# Patient Record
Sex: FEMALE | Race: WHITE | NOT HISPANIC OR LATINO | Employment: FULL TIME | URBAN - METROPOLITAN AREA
[De-identification: names, ages, dates, MRNs, and addresses within clinical notes are randomized per-mention and may not be internally consistent; named-entity substitution may affect disease eponyms.]

---

## 2017-01-20 ENCOUNTER — AMBULATORY - HEALTHEAST (OUTPATIENT)
Dept: FAMILY MEDICINE | Facility: CLINIC | Age: 67
End: 2017-01-20

## 2017-01-20 DIAGNOSIS — Z12.11 SCREENING FOR COLON CANCER: ICD-10-CM

## 2017-02-20 ENCOUNTER — OFFICE VISIT - HEALTHEAST (OUTPATIENT)
Dept: FAMILY MEDICINE | Facility: CLINIC | Age: 67
End: 2017-02-20

## 2017-02-20 ENCOUNTER — RECORDS - HEALTHEAST (OUTPATIENT)
Dept: MAMMOGRAPHY | Facility: CLINIC | Age: 67
End: 2017-02-20

## 2017-02-20 DIAGNOSIS — K21.9 GASTROESOPHAGEAL REFLUX DISEASE WITHOUT ESOPHAGITIS: ICD-10-CM

## 2017-02-20 DIAGNOSIS — I10 HYPERTENSION: ICD-10-CM

## 2017-02-20 DIAGNOSIS — Z12.31 ENCOUNTER FOR SCREENING MAMMOGRAM FOR MALIGNANT NEOPLASM OF BREAST: ICD-10-CM

## 2017-02-20 DIAGNOSIS — E78.2 MIXED HYPERLIPIDEMIA: ICD-10-CM

## 2017-02-20 DIAGNOSIS — Z00.00 ROUTINE GENERAL MEDICAL EXAMINATION AT A HEALTH CARE FACILITY: ICD-10-CM

## 2017-02-20 DIAGNOSIS — Z23 NEED FOR ZOSTER VACCINATION: ICD-10-CM

## 2017-02-20 LAB
CHOLEST SERPL-MCNC: 257 MG/DL
FASTING STATUS PATIENT QL REPORTED: YES
HDLC SERPL-MCNC: 64 MG/DL
LDLC SERPL CALC-MCNC: 177 MG/DL
TRIGL SERPL-MCNC: 82 MG/DL

## 2017-02-20 ASSESSMENT — MIFFLIN-ST. JEOR: SCORE: 1179.06

## 2017-02-23 LAB
HPV INTERPRETATION - HISTORICAL: NORMAL
HPV INTERPRETER - HISTORICAL: NORMAL

## 2017-02-27 ENCOUNTER — COMMUNICATION - HEALTHEAST (OUTPATIENT)
Dept: FAMILY MEDICINE | Facility: CLINIC | Age: 67
End: 2017-02-27

## 2017-02-28 LAB
BKR LAB AP ABNORMAL BLEEDING: NO
BKR LAB AP BIRTH CONTROL/HORMONES: NORMAL
BKR LAB AP CERVICAL APPEARANCE: NORMAL
BKR LAB AP GYN ADEQUACY: NORMAL
BKR LAB AP GYN INTERPRETATION: NORMAL
BKR LAB AP HPV REFLEX: NORMAL
BKR LAB AP LMP: NORMAL
BKR LAB AP PATIENT STATUS: NORMAL
BKR LAB AP PREVIOUS ABNORMAL: NORMAL
BKR LAB AP PREVIOUS NORMAL: 2014
HIGH RISK?: NO
PATH REPORT.COMMENTS IMP SPEC: NORMAL
RESULT FLAG (HE HISTORICAL CONVERSION): NORMAL

## 2017-04-12 ENCOUNTER — COMMUNICATION - HEALTHEAST (OUTPATIENT)
Dept: FAMILY MEDICINE | Facility: CLINIC | Age: 67
End: 2017-04-12

## 2017-07-19 ENCOUNTER — AMBULATORY - HEALTHEAST (OUTPATIENT)
Dept: FAMILY MEDICINE | Facility: CLINIC | Age: 67
End: 2017-07-19

## 2017-07-19 DIAGNOSIS — Z23 NEED FOR VACCINATION FOR STREP PNEUMONIAE: ICD-10-CM

## 2017-07-25 ENCOUNTER — AMBULATORY - HEALTHEAST (OUTPATIENT)
Dept: NURSING | Facility: CLINIC | Age: 67
End: 2017-07-25

## 2017-07-25 DIAGNOSIS — Z00.00 ROUTINE HEALTH MAINTENANCE: ICD-10-CM

## 2017-07-25 DIAGNOSIS — Z23 NEED FOR VACCINATION FOR STREP PNEUMONIAE: ICD-10-CM

## 2017-07-31 ENCOUNTER — RECORDS - HEALTHEAST (OUTPATIENT)
Dept: ADMINISTRATIVE | Facility: OTHER | Age: 67
End: 2017-07-31

## 2018-03-21 ENCOUNTER — OFFICE VISIT - HEALTHEAST (OUTPATIENT)
Dept: FAMILY MEDICINE | Facility: CLINIC | Age: 68
End: 2018-03-21

## 2018-03-21 ENCOUNTER — RECORDS - HEALTHEAST (OUTPATIENT)
Dept: MAMMOGRAPHY | Facility: CLINIC | Age: 68
End: 2018-03-21

## 2018-03-21 DIAGNOSIS — I10 ESSENTIAL HYPERTENSION: ICD-10-CM

## 2018-03-21 DIAGNOSIS — Z12.31 ENCOUNTER FOR SCREENING MAMMOGRAM FOR MALIGNANT NEOPLASM OF BREAST: ICD-10-CM

## 2018-03-21 DIAGNOSIS — E78.2 MIXED HYPERLIPIDEMIA: ICD-10-CM

## 2018-03-21 DIAGNOSIS — Z23 NEED FOR TETANUS BOOSTER: ICD-10-CM

## 2018-03-21 DIAGNOSIS — Z12.31 VISIT FOR SCREENING MAMMOGRAM: ICD-10-CM

## 2018-03-21 DIAGNOSIS — I10 HYPERTENSION: ICD-10-CM

## 2018-03-21 DIAGNOSIS — Z00.00 ENCOUNTER FOR MEDICARE ANNUAL WELLNESS EXAM: ICD-10-CM

## 2018-03-21 DIAGNOSIS — K21.9 GASTROESOPHAGEAL REFLUX DISEASE WITHOUT ESOPHAGITIS: ICD-10-CM

## 2018-03-21 LAB
ALBUMIN SERPL-MCNC: 4 G/DL (ref 3.5–5)
ALP SERPL-CCNC: 91 U/L (ref 45–120)
ALT SERPL W P-5'-P-CCNC: 23 U/L (ref 0–45)
ANION GAP SERPL CALCULATED.3IONS-SCNC: 8 MMOL/L (ref 5–18)
AST SERPL W P-5'-P-CCNC: 21 U/L (ref 0–40)
BILIRUB SERPL-MCNC: 0.9 MG/DL (ref 0–1)
BUN SERPL-MCNC: 21 MG/DL (ref 8–22)
CALCIUM SERPL-MCNC: 9.9 MG/DL (ref 8.5–10.5)
CHLORIDE BLD-SCNC: 105 MMOL/L (ref 98–107)
CHOLEST SERPL-MCNC: 248 MG/DL
CO2 SERPL-SCNC: 28 MMOL/L (ref 22–31)
CREAT SERPL-MCNC: 0.64 MG/DL (ref 0.6–1.1)
FASTING STATUS PATIENT QL REPORTED: YES
GFR SERPL CREATININE-BSD FRML MDRD: >60 ML/MIN/1.73M2
GLUCOSE BLD-MCNC: 91 MG/DL (ref 70–125)
HDLC SERPL-MCNC: 57 MG/DL
LDLC SERPL CALC-MCNC: 166 MG/DL
POTASSIUM BLD-SCNC: 5.1 MMOL/L (ref 3.5–5)
PROT SERPL-MCNC: 7.3 G/DL (ref 6–8)
SODIUM SERPL-SCNC: 141 MMOL/L (ref 136–145)
TRIGL SERPL-MCNC: 126 MG/DL

## 2018-03-21 ASSESSMENT — MIFFLIN-ST. JEOR: SCORE: 1194.93

## 2018-04-02 ENCOUNTER — AMBULATORY - HEALTHEAST (OUTPATIENT)
Dept: FAMILY MEDICINE | Facility: CLINIC | Age: 68
End: 2018-04-02

## 2018-04-02 DIAGNOSIS — E78.2 MIXED HYPERLIPIDEMIA: ICD-10-CM

## 2019-03-25 ENCOUNTER — RECORDS - HEALTHEAST (OUTPATIENT)
Dept: MAMMOGRAPHY | Facility: CLINIC | Age: 69
End: 2019-03-25

## 2019-03-25 ENCOUNTER — OFFICE VISIT - HEALTHEAST (OUTPATIENT)
Dept: FAMILY MEDICINE | Facility: CLINIC | Age: 69
End: 2019-03-25

## 2019-03-25 DIAGNOSIS — Z00.00 ROUTINE GENERAL MEDICAL EXAMINATION AT A HEALTH CARE FACILITY: ICD-10-CM

## 2019-03-25 DIAGNOSIS — I10 HYPERTENSION: ICD-10-CM

## 2019-03-25 DIAGNOSIS — Z12.31 ENCOUNTER FOR SCREENING MAMMOGRAM FOR MALIGNANT NEOPLASM OF BREAST: ICD-10-CM

## 2019-03-25 DIAGNOSIS — Z23 NEED FOR VACCINATION AGAINST STREPTOCOCCUS PNEUMONIAE: ICD-10-CM

## 2019-03-25 DIAGNOSIS — Z23 NEED FOR SHINGLES VACCINE: ICD-10-CM

## 2019-03-25 DIAGNOSIS — E78.2 MIXED HYPERLIPIDEMIA: ICD-10-CM

## 2019-03-25 DIAGNOSIS — Z00.00 ENCOUNTER FOR MEDICARE ANNUAL WELLNESS EXAM: ICD-10-CM

## 2019-03-25 LAB
ALBUMIN SERPL-MCNC: 4.5 G/DL (ref 3.5–5)
ALP SERPL-CCNC: 81 U/L (ref 45–120)
ALT SERPL W P-5'-P-CCNC: 23 U/L (ref 0–45)
ANION GAP SERPL CALCULATED.3IONS-SCNC: 14 MMOL/L (ref 5–18)
AST SERPL W P-5'-P-CCNC: 26 U/L (ref 0–40)
BILIRUB SERPL-MCNC: 1 MG/DL (ref 0–1)
BUN SERPL-MCNC: 27 MG/DL (ref 8–22)
CALCIUM SERPL-MCNC: 9.9 MG/DL (ref 8.5–10.5)
CHLORIDE BLD-SCNC: 105 MMOL/L (ref 98–107)
CHOLEST SERPL-MCNC: 272 MG/DL
CO2 SERPL-SCNC: 23 MMOL/L (ref 22–31)
CREAT SERPL-MCNC: 0.71 MG/DL (ref 0.6–1.1)
FASTING STATUS PATIENT QL REPORTED: YES
GFR SERPL CREATININE-BSD FRML MDRD: >60 ML/MIN/1.73M2
GLUCOSE BLD-MCNC: 86 MG/DL (ref 70–125)
HDLC SERPL-MCNC: 70 MG/DL
LDLC SERPL CALC-MCNC: 182 MG/DL
POTASSIUM BLD-SCNC: 3.9 MMOL/L (ref 3.5–5)
PROT SERPL-MCNC: 7.8 G/DL (ref 6–8)
SODIUM SERPL-SCNC: 142 MMOL/L (ref 136–145)
TRIGL SERPL-MCNC: 101 MG/DL

## 2019-03-25 ASSESSMENT — MIFFLIN-ST. JEOR: SCORE: 1191.03

## 2019-03-26 LAB
25(OH)D3 SERPL-MCNC: 28.3 NG/ML (ref 30–80)
25(OH)D3 SERPL-MCNC: 28.3 NG/ML (ref 30–80)

## 2019-03-29 ENCOUNTER — AMBULATORY - HEALTHEAST (OUTPATIENT)
Dept: FAMILY MEDICINE | Facility: CLINIC | Age: 69
End: 2019-03-29

## 2019-03-29 DIAGNOSIS — Z78.0 MENOPAUSE: ICD-10-CM

## 2019-06-25 ENCOUNTER — COMMUNICATION - HEALTHEAST (OUTPATIENT)
Dept: FAMILY MEDICINE | Facility: CLINIC | Age: 69
End: 2019-06-25

## 2019-07-05 ENCOUNTER — AMBULATORY - HEALTHEAST (OUTPATIENT)
Dept: NURSING | Facility: CLINIC | Age: 69
End: 2019-07-05

## 2019-07-05 ENCOUNTER — AMBULATORY - HEALTHEAST (OUTPATIENT)
Dept: FAMILY MEDICINE | Facility: CLINIC | Age: 69
End: 2019-07-05

## 2019-07-05 DIAGNOSIS — F10.20 ALCOHOL DEPENDENCE, DAILY USE (H): ICD-10-CM

## 2019-08-27 ENCOUNTER — RECORDS - HEALTHEAST (OUTPATIENT)
Dept: ADMINISTRATIVE | Facility: OTHER | Age: 69
End: 2019-08-27

## 2019-09-12 ENCOUNTER — COMMUNICATION - HEALTHEAST (OUTPATIENT)
Dept: SCHEDULING | Facility: CLINIC | Age: 69
End: 2019-09-12

## 2019-09-13 ENCOUNTER — OFFICE VISIT - HEALTHEAST (OUTPATIENT)
Dept: FAMILY MEDICINE | Facility: CLINIC | Age: 69
End: 2019-09-13

## 2019-09-13 DIAGNOSIS — R05.9 COUGH: ICD-10-CM

## 2019-09-13 DIAGNOSIS — R06.2 WHEEZE: ICD-10-CM

## 2019-11-16 ENCOUNTER — AMBULATORY - HEALTHEAST (OUTPATIENT)
Dept: NURSING | Facility: CLINIC | Age: 69
End: 2019-11-16

## 2019-11-16 DIAGNOSIS — Z23 FLU VACCINE NEED: ICD-10-CM

## 2020-07-15 ENCOUNTER — OFFICE VISIT - HEALTHEAST (OUTPATIENT)
Dept: FAMILY MEDICINE | Facility: CLINIC | Age: 70
End: 2020-07-15

## 2020-07-15 DIAGNOSIS — L98.9 SKIN LESION: ICD-10-CM

## 2020-07-15 DIAGNOSIS — I10 HYPERTENSION: ICD-10-CM

## 2020-07-15 DIAGNOSIS — Z00.00 ENCOUNTER FOR MEDICARE ANNUAL WELLNESS EXAM: ICD-10-CM

## 2020-07-15 DIAGNOSIS — Z00.00 ROUTINE GENERAL MEDICAL EXAMINATION AT A HEALTH CARE FACILITY: ICD-10-CM

## 2020-07-15 DIAGNOSIS — E55.9 VITAMIN D DEFICIENCY: ICD-10-CM

## 2020-07-15 DIAGNOSIS — Z11.59 ENCOUNTER FOR HCV SCREENING TEST FOR LOW RISK PATIENT: ICD-10-CM

## 2020-07-15 LAB
ALBUMIN SERPL-MCNC: 4.4 G/DL (ref 3.5–5)
ALP SERPL-CCNC: 86 U/L (ref 45–120)
ALT SERPL W P-5'-P-CCNC: 19 U/L (ref 0–45)
ANION GAP SERPL CALCULATED.3IONS-SCNC: 13 MMOL/L (ref 5–18)
AST SERPL W P-5'-P-CCNC: 22 U/L (ref 0–40)
BILIRUB SERPL-MCNC: 0.9 MG/DL (ref 0–1)
BUN SERPL-MCNC: 18 MG/DL (ref 8–22)
CALCIUM SERPL-MCNC: 9.9 MG/DL (ref 8.5–10.5)
CHLORIDE BLD-SCNC: 102 MMOL/L (ref 98–107)
CHOLEST SERPL-MCNC: 257 MG/DL
CO2 SERPL-SCNC: 25 MMOL/L (ref 22–31)
CREAT SERPL-MCNC: 0.75 MG/DL (ref 0.6–1.1)
FASTING STATUS PATIENT QL REPORTED: YES
GFR SERPL CREATININE-BSD FRML MDRD: >60 ML/MIN/1.73M2
GLUCOSE BLD-MCNC: 95 MG/DL (ref 70–125)
HDLC SERPL-MCNC: 60 MG/DL
LDLC SERPL CALC-MCNC: 177 MG/DL
POTASSIUM BLD-SCNC: 4.8 MMOL/L (ref 3.5–5)
PROT SERPL-MCNC: 7.3 G/DL (ref 6–8)
SODIUM SERPL-SCNC: 140 MMOL/L (ref 136–145)
TRIGL SERPL-MCNC: 101 MG/DL

## 2020-07-15 ASSESSMENT — MIFFLIN-ST. JEOR: SCORE: 1167.72

## 2020-07-16 LAB
25(OH)D3 SERPL-MCNC: 32.8 NG/ML (ref 30–80)
25(OH)D3 SERPL-MCNC: 32.8 NG/ML (ref 30–80)
HCV AB SERPL QL IA: NEGATIVE

## 2020-07-20 ENCOUNTER — COMMUNICATION - HEALTHEAST (OUTPATIENT)
Dept: FAMILY MEDICINE | Facility: CLINIC | Age: 70
End: 2020-07-20

## 2020-10-10 ENCOUNTER — NURSE TRIAGE (OUTPATIENT)
Dept: NURSING | Facility: CLINIC | Age: 70
End: 2020-10-10

## 2020-10-10 NOTE — TELEPHONE ENCOUNTER
Wondering if there is a special flu shot available for older people.  Transferred to scheduling to make a nurse only appointment for a vaccination.  Christy Campo RN, [facility]      Additional Information    Influenza vaccine (shot), questions about    Protocols used: INFLUENZA - SEASONAL-A-AH

## 2020-10-14 ENCOUNTER — AMBULATORY - HEALTHEAST (OUTPATIENT)
Dept: NURSING | Facility: CLINIC | Age: 70
End: 2020-10-14

## 2020-11-02 ENCOUNTER — COMMUNICATION - HEALTHEAST (OUTPATIENT)
Dept: SCHEDULING | Facility: CLINIC | Age: 70
End: 2020-11-02

## 2020-11-09 ENCOUNTER — OFFICE VISIT - HEALTHEAST (OUTPATIENT)
Dept: FAMILY MEDICINE | Facility: CLINIC | Age: 70
End: 2020-11-09

## 2020-11-09 ENCOUNTER — COMMUNICATION - HEALTHEAST (OUTPATIENT)
Dept: FAMILY MEDICINE | Facility: CLINIC | Age: 70
End: 2020-11-09

## 2020-11-09 DIAGNOSIS — R40.4 SPELL OF ALTERED CONSCIOUSNESS: ICD-10-CM

## 2020-11-09 DIAGNOSIS — E78.2 MIXED HYPERLIPIDEMIA: ICD-10-CM

## 2020-11-17 ENCOUNTER — OFFICE VISIT (OUTPATIENT)
Dept: NEUROLOGY | Facility: CLINIC | Age: 70
End: 2020-11-17
Payer: COMMERCIAL

## 2020-11-17 ENCOUNTER — RECORDS - HEALTHEAST (OUTPATIENT)
Dept: ADMINISTRATIVE | Facility: OTHER | Age: 70
End: 2020-11-17

## 2020-11-17 VITALS
WEIGHT: 145 LBS | SYSTOLIC BLOOD PRESSURE: 140 MMHG | BODY MASS INDEX: 24.75 KG/M2 | HEART RATE: 63 BPM | DIASTOLIC BLOOD PRESSURE: 75 MMHG | HEIGHT: 64 IN

## 2020-11-17 DIAGNOSIS — F10.20 ALCOHOL DEPENDENCE, DAILY USE (H): ICD-10-CM

## 2020-11-17 DIAGNOSIS — G45.9 TIA (TRANSIENT ISCHEMIC ATTACK): Primary | ICD-10-CM

## 2020-11-17 DIAGNOSIS — R41.0 CONFUSION: ICD-10-CM

## 2020-11-17 DIAGNOSIS — I10 BENIGN ESSENTIAL HYPERTENSION: ICD-10-CM

## 2020-11-17 PROBLEM — K57.30 DIVERTICULOSIS OF LARGE INTESTINE: Status: ACTIVE | Noted: 2017-08-02

## 2020-11-17 PROCEDURE — 99205 OFFICE O/P NEW HI 60 MIN: CPT | Performed by: PSYCHIATRY & NEUROLOGY

## 2020-11-17 RX ORDER — LISINOPRIL/HYDROCHLOROTHIAZIDE 10-12.5 MG
1 TABLET ORAL
COMMUNITY
Start: 2020-07-15 | End: 2021-07-20

## 2020-11-17 RX ORDER — FENOFIBRATE 145 MG/1
145 TABLET, COATED ORAL DAILY
COMMUNITY
Start: 2020-11-10 | End: 2021-08-03

## 2020-11-17 RX ORDER — BIOTIN 800 MCG
1 TABLET ORAL
COMMUNITY
Start: 2020-07-15 | End: 2024-07-01

## 2020-11-17 RX ORDER — ASPIRIN 325 MG
325 TABLET ORAL DAILY
Status: ON HOLD | COMMUNITY
End: 2024-08-01

## 2020-11-17 SDOH — HEALTH STABILITY: MENTAL HEALTH: HOW OFTEN DO YOU HAVE 6 OR MORE DRINKS ON ONE OCCASION?: NOT ASKED

## 2020-11-17 SDOH — HEALTH STABILITY: MENTAL HEALTH: HOW OFTEN DO YOU HAVE A DRINK CONTAINING ALCOHOL?: 4 OR MORE TIMES A WEEK

## 2020-11-17 SDOH — HEALTH STABILITY: MENTAL HEALTH: HOW MANY STANDARD DRINKS CONTAINING ALCOHOL DO YOU HAVE ON A TYPICAL DAY?: NOT ASKED

## 2020-11-17 ASSESSMENT — MIFFLIN-ST. JEOR: SCORE: 1172.72

## 2020-11-17 NOTE — LETTER
11/17/2020         RE: Geneva Bond  [address] [place] [address]        Dear Colleague,    Thank you for referring your patient, Geneva oBnd, to the [facility] [place]. Please see a copy of my visit note below.        NEUROLOGY NOTE        Assessment/Plan          Recurrent spell of confusions.  Could be seizure activities or dancing global amnesia versus TIAs.    Alcohol dependence, recommend no more than 1 glass of wine a day    History of hypertension    Bilateral carotid stenosis mild degree and in the right vertebral artery as well.    Plan:    EEG study to rule out seizure.  We will see her after EEG study to discuss all test results.    MRI study of the brain to evaluate the structure of hippocampus for recurrent spells of confusion and rule out other possible potential foci for seizure.    Recommend reduce alcohol intake less than 1 glass of wine a day    Recommend to work with primary to keep LDL below 70 for secondary stroke prevention.  And in working with primary to keep blood pressure in normal range as well.    Consider echocardiogram.    Continue aspirin may drop to 25 mg a day.    Recommend to check TSH.  Consider to check B1 and B12 levels.  Patient has some concern about cost of testings.  We will hold off blood tests.  She will take B1 and B12 supplements 2 days a week for now.  Her last TSH was normal in 2014.      Outside chart reviewed extensively from ER visits and from primary care physician's notes.  Discussed with patient diagnosis and differentials.  Discussed testing options.       SUBJECTIVE       Geneva Bond is a 70 year old female seen for recurrent confusion spells.      He has a history of hypertension, vitamin D deficiency.     Had 2 episodes of confusion in late October and early November 2020, presented to ER on 11/2/2020.  She described a sensation of losing track of what she was doing, or feeling something washed over her,  "and cannot remember passwords, confused about what she is doing.  She had mild headache afterwards which resolved using Excedrin.  Each episode lasted 1 to 1.5 hours.  She was alone when things happened.  She did not feel lightheaded no auras.  No incontinence.  No recent head injuries.  No change of medication.    Does drink about 3 alcoholic beverages a day, reportedly 2 days a week.    No focal weakness or sensory difficulty.  No clear cranial nerve symptoms.  She did not talk not able to tell if she had any speech or language difficulties.    Work-ups:  11/2020: BMP, CBC unremarkable.  7/2020: Liver function normal.  .    IMPRESSION:   HEAD CT:  1.  No acute intracranial process.     HEAD CTA:   1.  Mild plaque within the carotid siphons and right vertebral artery.  2.  No stenosis or branch artery occlusion.  3.  No aneurysm or vascular malformation.     NECK CTA:  1.  Mild plaque at the carotid bifurcations without measurable stenosis of the internal carotid arteries.  2.  No vertebral artery stenosis.         Review of system     10 point system review otherwise unremarkable    PHYSICAL EXAMINATION     Vital signs in last 24 hours:  Vitals:    11/17/20 1117   BP: (!) 140/75   Pulse: 63   Weight: 65.8 kg (145 lb)   Height: 1.626 m (5' 4\")       Very pleasant lady.  A little anxious looking.  Sitting in chair no acute distress.  No edema or skin rashes.  No breathing difficulty or JVD.  Thyroid gland slightly enlarged.  Normal mental status, language and speech.  Cranial nerves II through XII intact.  Normal muscle bulk, tone and strength in 4 extremities.  No clear focal sensory difficulty.  Decreased dependent reflexes but no Babinski signs.  Hand coordination, gait and station intact.  Romberg sign negative.      Problem List     Patient Active Problem List    Diagnosis Date Noted     Diverticulosis of large intestine 08/02/2017     Priority: Medium     Per colonoscopy 7/2017       Pain of right upper " extremity 02/15/2016     Priority: Medium     HLD (hyperlipidemia) 02/15/2016     Priority: Medium     Gastroesophageal reflux disease without esophagitis 02/15/2016     Priority: Medium     Essential hypertension 02/15/2016     Priority: Medium     Alcohol dependence, daily use (H) 02/15/2016     Priority: Medium         Past medical history     Hypertension      Family history     Family History   Problem Relation Age of Onset     Cerebrovascular Disease Mother      Cerebrovascular Disease Father          Social history     Social History     Socioeconomic History     Marital status:      Spouse name: Not on file     Number of children: Not on file     Years of education: Not on file     Highest education level: Not on file   Occupational History     Not on file   Social Needs     Financial resource strain: Not on file     Food insecurity     Worry: Not on file     Inability: Not on file     Transportation needs     Medical: Not on file     Non-medical: Not on file   Tobacco Use     Smoking status: Former Smoker     Years: 17.00     Smokeless tobacco: Never Used   Substance and Sexual Activity     Alcohol use: Yes     Alcohol/week: 2.0 standard drinks     Types: 2 Glasses of wine per week     Frequency: 4 or more times a week     Drug use: Not on file     Sexual activity: Not on file   Lifestyle     Physical activity     Days per week: Not on file     Minutes per session: Not on file     Stress: Not on file   Relationships     Social connections     Talks on phone: Not on file     Gets together: Not on file     Attends Protestant service: Not on file     Active member of club or organization: Not on file     Attends meetings of clubs or organizations: Not on file     Relationship status: Not on file     Intimate partner violence     Fear of current or ex partner: Not on file     Emotionally abused: Not on file     Physically abused: Not on file     Forced sexual activity: Not on file   Other Topics Concern      Parent/sibling w/ CABG, MI or angioplasty before 65F 55M? Not Asked   Social History Narrative     Not on file         Allergy     Ibuprofen and Atorvastatin    MEDICATIONS List     Current Outpatient Medications   Medication Sig Dispense Refill     aspirin (ASA) 325 MG tablet Take 325 mg by mouth daily       Biotin 800 MCG TABS Take 1 tablet by mouth       cholecalciferol 25 MCG (1000 UT) TABS Take 1,000 Units by mouth       fenofibrate (TRICOR) 145 MG tablet Take 145 mg by mouth daily       lisinopril-hydrochlorothiazide (ZESTORETIC) 10-12.5 MG tablet Take 1 tablet by mouth       omeprazole (PRILOSEC) 20 MG DR capsule Take 20 mg by mouth                     Melody Oneal MD, MD, PhD  Neurology   Office tel: [phone]        This note was dictated using voice recognition software.  Any grammatical or context distortions are unintentional and inherent to the software. The note is tailored to serve physicians for communications among them, who knows what are the most important elements of history taken for disease diagnosis and differentials as well as management plans. Due to time factors, the notes are in general not reviewed before signing. Again due to time factor, follow-up notes often carries over old notes if they are relevant, so most clinic time is dedicated to interviewing with patients and caregivers, on clinical assessment, coordinating care and management.         Again, thank you for allowing me to participate in the care of your patient.        Sincerely,        Melody Oneal MD

## 2020-11-17 NOTE — PROGRESS NOTES
NEUROLOGY NOTE        Assessment/Plan          Recurrent spell of confusions.  Could be seizure activities or dancing global amnesia versus TIAs.    Alcohol dependence, recommend no more than 1 glass of wine a day    History of hypertension    Bilateral carotid stenosis mild degree and in the right vertebral artery as well.    Plan:    EEG study to rule out seizure.  We will see her after EEG study to discuss all test results.    MRI study of the brain to evaluate the structure of hippocampus for recurrent spells of confusion and rule out other possible potential foci for seizure.    Recommend reduce alcohol intake less than 1 glass of wine a day    Recommend to work with primary to keep LDL below 70 for secondary stroke prevention.  And in working with primary to keep blood pressure in normal range as well.    Consider echocardiogram.    Continue aspirin may drop to 25 mg a day.    Recommend to check TSH.  Consider to check B1 and B12 levels.  Patient has some concern about cost of testings.  We will hold off blood tests.  She will take B1 and B12 supplements 2 days a week for now.  Her last TSH was normal in 2014.      Outside chart reviewed extensively from ER visits and from primary care physician's notes.  Discussed with patient diagnosis and differentials.  Discussed testing options.    Total time spend about 50 min, more then 50% consoling.        SUBJECTIVE       Geneva Bond is a 70 year old female seen for recurrent confusion spells.      He has a history of hypertension, vitamin D deficiency.     Had 2 episodes of confusion in late October and early November 2020, presented to ER on 11/2/2020.  She described a sensation of losing track of what she was doing, or feeling something washed over her, and cannot remember passwords, confused about what she is doing.  She had mild headache afterwards which resolved using Excedrin.  Each episode lasted 1 to 1.5 hours.  She was alone when things happened.   "She did not feel lightheaded no auras.  No incontinence.  No recent head injuries.  No change of medication.    Does drink about 3 alcoholic beverages a day, reportedly 2 days a week.    No focal weakness or sensory difficulty.  No clear cranial nerve symptoms.  She did not talk not able to tell if she had any speech or language difficulties.    Work-ups:  11/2020: BMP, CBC unremarkable.  7/2020: Liver function normal.  .    IMPRESSION:   HEAD CT:  1.  No acute intracranial process.     HEAD CTA:   1.  Mild plaque within the carotid siphons and right vertebral artery.  2.  No stenosis or branch artery occlusion.  3.  No aneurysm or vascular malformation.     NECK CTA:  1.  Mild plaque at the carotid bifurcations without measurable stenosis of the internal carotid arteries.  2.  No vertebral artery stenosis.         Review of system     10 point system review otherwise unremarkable    PHYSICAL EXAMINATION     Vital signs in last 24 hours:  Vitals:    11/17/20 1117   BP: (!) 140/75   Pulse: 63   Weight: 65.8 kg (145 lb)   Height: 1.626 m (5' 4\")       Very pleasant lady.  A little anxious looking.  Sitting in chair no acute distress.  No edema or skin rashes.  No breathing difficulty or JVD.  Thyroid gland slightly enlarged.  Normal mental status, language and speech.  Cranial nerves II through XII intact.  Normal muscle bulk, tone and strength in 4 extremities.  No clear focal sensory difficulty.  Decreased dependent reflexes but no Babinski signs.  Hand coordination, gait and station intact.  Romberg sign negative.      Problem List     Patient Active Problem List    Diagnosis Date Noted     Diverticulosis of large intestine 08/02/2017     Priority: Medium     Per colonoscopy 7/2017       Pain of right upper extremity 02/15/2016     Priority: Medium     HLD (hyperlipidemia) 02/15/2016     Priority: Medium     Gastroesophageal reflux disease without esophagitis 02/15/2016     Priority: Medium     Essential " hypertension 02/15/2016     Priority: Medium     Alcohol dependence, daily use (H) 02/15/2016     Priority: Medium         Past medical history     Hypertension      Family history     Family History   Problem Relation Age of Onset     Cerebrovascular Disease Mother      Cerebrovascular Disease Father          Social history     Social History     Socioeconomic History     Marital status:      Spouse name: Not on file     Number of children: Not on file     Years of education: Not on file     Highest education level: Not on file   Occupational History     Not on file   Social Needs     Financial resource strain: Not on file     Food insecurity     Worry: Not on file     Inability: Not on file     Transportation needs     Medical: Not on file     Non-medical: Not on file   Tobacco Use     Smoking status: Former Smoker     Years: 17.00     Smokeless tobacco: Never Used   Substance and Sexual Activity     Alcohol use: Yes     Alcohol/week: 2.0 standard drinks     Types: 2 Glasses of wine per week     Frequency: 4 or more times a week     Drug use: Not on file     Sexual activity: Not on file   Lifestyle     Physical activity     Days per week: Not on file     Minutes per session: Not on file     Stress: Not on file   Relationships     Social connections     Talks on phone: Not on file     Gets together: Not on file     Attends Caodaism service: Not on file     Active member of club or organization: Not on file     Attends meetings of clubs or organizations: Not on file     Relationship status: Not on file     Intimate partner violence     Fear of current or ex partner: Not on file     Emotionally abused: Not on file     Physically abused: Not on file     Forced sexual activity: Not on file   Other Topics Concern     Parent/sibling w/ CABG, MI or angioplasty before 65F 55M? Not Asked   Social History Narrative     Not on file         Allergy     Ibuprofen and Atorvastatin    MEDICATIONS List     Current  Outpatient Medications   Medication Sig Dispense Refill     aspirin (ASA) 325 MG tablet Take 325 mg by mouth daily       Biotin 800 MCG TABS Take 1 tablet by mouth       cholecalciferol 25 MCG (1000 UT) TABS Take 1,000 Units by mouth       fenofibrate (TRICOR) 145 MG tablet Take 145 mg by mouth daily       lisinopril-hydrochlorothiazide (ZESTORETIC) 10-12.5 MG tablet Take 1 tablet by mouth       omeprazole (PRILOSEC) 20 MG DR capsule Take 20 mg by mouth                     Melody Oneal MD, MD, PhD  Neurology   Office tel: [phone]        This note was dictated using voice recognition software.  Any grammatical or context distortions are unintentional and inherent to the software. The note is tailored to serve physicians for communications among them, who knows what are the most important elements of history taken for disease diagnosis and differentials as well as management plans. Due to time factors, the notes are in general not reviewed before signing. Again due to time factor, follow-up notes often carries over old notes if they are relevant, so most clinic time is dedicated to interviewing with patients and caregivers, on clinical assessment, coordinating care and management.

## 2020-12-05 ENCOUNTER — HOSPITAL ENCOUNTER (OUTPATIENT)
Dept: MRI IMAGING | Facility: HOSPITAL | Age: 70
Discharge: HOME | End: 2020-12-05
Attending: PSYCHIATRY & NEUROLOGY

## 2020-12-05 DIAGNOSIS — R41.0 CONFUSION: ICD-10-CM

## 2020-12-05 DIAGNOSIS — G45.9 TIA (TRANSIENT ISCHEMIC ATTACK): ICD-10-CM

## 2020-12-14 ENCOUNTER — RECORDS - HEALTHEAST (OUTPATIENT)
Dept: ADMINISTRATIVE | Facility: OTHER | Age: 70
End: 2020-12-14

## 2020-12-14 ENCOUNTER — OFFICE VISIT (OUTPATIENT)
Dept: NEUROLOGY | Facility: CLINIC | Age: 70
End: 2020-12-14
Attending: PSYCHIATRY & NEUROLOGY
Payer: COMMERCIAL

## 2020-12-14 VITALS
WEIGHT: 145 LBS | DIASTOLIC BLOOD PRESSURE: 80 MMHG | SYSTOLIC BLOOD PRESSURE: 126 MMHG | HEIGHT: 64 IN | BODY MASS INDEX: 24.75 KG/M2 | HEART RATE: 73 BPM

## 2020-12-14 DIAGNOSIS — I10 BENIGN ESSENTIAL HYPERTENSION: ICD-10-CM

## 2020-12-14 DIAGNOSIS — G45.9 TIA (TRANSIENT ISCHEMIC ATTACK): Primary | ICD-10-CM

## 2020-12-14 DIAGNOSIS — F10.20 ALCOHOL DEPENDENCE, DAILY USE (H): ICD-10-CM

## 2020-12-14 DIAGNOSIS — R41.0 CONFUSION: ICD-10-CM

## 2020-12-14 PROCEDURE — 99212 OFFICE O/P EST SF 10 MIN: CPT | Mod: 25 | Performed by: PSYCHIATRY & NEUROLOGY

## 2020-12-14 PROCEDURE — 95816 EEG AWAKE AND DROWSY: CPT | Performed by: PSYCHIATRY & NEUROLOGY

## 2020-12-14 RX ORDER — LANOLIN ALCOHOL/MO/W.PET/CERES
CREAM (GRAM) TOPICAL DAILY
COMMUNITY
End: 2024-07-01

## 2020-12-14 RX ORDER — THIAMINE HCL 500 MG
TABLET ORAL
COMMUNITY
End: 2024-07-01

## 2020-12-14 ASSESSMENT — MIFFLIN-ST. JEOR: SCORE: 1172.72

## 2020-12-14 NOTE — LETTER
12/14/2020         RE: Geneva Bond  [address] [place] [address]        Dear Colleague,    Thank you for referring your patient, Geneva Bond, to the [facility] [place]. Please see a copy of my visit note below.        NEUROLOGY NOTE        Assessment/Plan            Rare patient will recurrent spell of confusions.  Most likely TIA or transient global amnesia.  EEG showing no typical seizure activities.  MRI brain unremarkable.  There are some dysrhythmic theta slowing in the posterior temporal region but no definitive seizure activity detected.  Cannot rule out basilar artery migraine contributing to symptoms.    Mild plaques bilateral carotid by CTA of the neck.    Alcohol dependence, recommend no more than 1 glass of wine a day    History of hypertension    Bilateral carotid stenosis mild degree and in the right vertebral artery as well.     Plan:    Recommend reduce alcohol intake less than 1 glass of wine a day    Recommend to work with primary to keep LDL below 70 for secondary stroke prevention.  And in working with primary to keep blood pressure in normal range as well.    Continue aspirin may drop to 81 mg a day.    Follow-up with me at anytime with recurrent symptoms.  Otherwise follow-up with me in 6 months regarding bilateral carotid stenosis.    Avoid medications that may provoke seizures.               SUBJECTIVE         Geneva Bond is a 70 year old female seen for recurrent confusion spells.       Since last visit no recurrent spells.  EEG today 12/14/2020 showing no typical seizure activities.    He has a history of hypertension, vitamin D deficiency.      Had 2 episodes of confusion in late October and early November 2020, presented to ER on 11/2/2020.  She described a sensation of losing track of what she was doing, or feeling something washed over her, and cannot remember passwords, confused about what she is doing.  She had mild headache  "afterwards which resolved using Excedrin.  Each episode lasted 1 to 1.5 hours.  She was alone when things happened.  She did not feel lightheaded no auras.  No incontinence.  No recent head injuries.  No change of medication.     Does drink about 3 alcoholic beverages a day, reportedly 2 days a week.     No focal weakness or sensory difficulty.  No clear cranial nerve symptoms.  She did not talk not able to tell if she had any speech or language difficulties.     Work-ups:  11/2020: BMP, CBC unremarkable.  7/2020: Liver function normal.  .     IMPRESSION: MRI brain 12/5/2020.  1.  A few tiny nonspecific white matter changes.  2.  No evidence for intracranial hemorrhage, acute infarct, or any focal mass lesions.         IMPRESSION:   HEAD CT:  1.  No acute intracranial process.     HEAD CTA:   1.  Mild plaque within the carotid siphons and right vertebral artery.  2.  No stenosis or branch artery occlusion.  3.  No aneurysm or vascular malformation.     NECK CTA:  1.  Mild plaque at the carotid bifurcations without measurable stenosis of the internal carotid arteries.  2.  No vertebral artery stenosis.           Review of system     10 point system review otherwise unremarkable    PHYSICAL EXAMINATION     Vital signs in last 24 hours:  Vitals:    12/14/20 1216   BP: 126/80   Pulse: 73   Weight: 65.8 kg (145 lb)   Height: 1.626 m (5' 4\")       Very pleasant lady.  A little anxious looking.  Sitting in chair no acute distress.  No edema or skin rashes.  No breathing difficulty or JVD.  Thyroid gland slightly enlarged.  Normal mental status, language and speech.  Cranial nerves II through XII intact.  Normal muscle bulk, tone and strength in 4 extremities.  No clear focal sensory difficulty.       Problem List     Patient Active Problem List    Diagnosis Date Noted     Diverticulosis of large intestine 08/02/2017     Priority: Medium     Per colonoscopy 7/2017       Pain of right upper extremity 02/15/2016     " Priority: Medium     HLD (hyperlipidemia) 02/15/2016     Priority: Medium     Gastroesophageal reflux disease without esophagitis 02/15/2016     Priority: Medium     Essential hypertension 02/15/2016     Priority: Medium     Alcohol dependence, daily use (H) 02/15/2016     Priority: Medium         Past medical history     History reviewed. No pertinent surgical history.    Past Medical History:   Diagnosis Date     Benign essential hypertension            Family history     Family History   Problem Relation Age of Onset     Cerebrovascular Disease Mother      Cerebrovascular Disease Father          Social history     Social History     Socioeconomic History     Marital status:      Spouse name: Not on file     Number of children: Not on file     Years of education: Not on file     Highest education level: Not on file   Occupational History     Not on file   Social Needs     Financial resource strain: Not on file     Food insecurity     Worry: Not on file     Inability: Not on file     Transportation needs     Medical: Not on file     Non-medical: Not on file   Tobacco Use     Smoking status: Former Smoker     Years: 17.00     Smokeless tobacco: Never Used   Substance and Sexual Activity     Alcohol use: Yes     Alcohol/week: 2.0 standard drinks     Types: 2 Glasses of wine per week     Frequency: 4 or more times a week     Drug use: Not on file     Sexual activity: Not on file   Lifestyle     Physical activity     Days per week: Not on file     Minutes per session: Not on file     Stress: Not on file   Relationships     Social connections     Talks on phone: Not on file     Gets together: Not on file     Attends Scientology service: Not on file     Active member of club or organization: Not on file     Attends meetings of clubs or organizations: Not on file     Relationship status: Not on file     Intimate partner violence     Fear of current or ex partner: Not on file     Emotionally abused: Not on file      Physically abused: Not on file     Forced sexual activity: Not on file   Other Topics Concern     Parent/sibling w/ CABG, MI or angioplasty before 65F 55M? Not Asked   Social History Narrative     Not on file         Allergy     Ibuprofen and Atorvastatin    MEDICATIONS List     Current Outpatient Medications   Medication Sig Dispense Refill     aspirin (ASA) 325 MG tablet Take 325 mg by mouth daily       Biotin 800 MCG TABS Take 1 tablet by mouth       cholecalciferol 25 MCG (1000 UT) TABS Take 1,000 Units by mouth       cyanocobalamin (VITAMIN B-12) 1000 MCG tablet Take by mouth daily       fenofibrate (TRICOR) 145 MG tablet Take 145 mg by mouth daily       lisinopril-hydrochlorothiazide (ZESTORETIC) 10-12.5 MG tablet Take 1 tablet by mouth       omeprazole (PRILOSEC) 20 MG DR capsule Take 20 mg by mouth       Thiamine HCl (B-1) 500 MG TABS                    Melody Oneal MD, MD, PhD  Neurology   Office tel: [phone]        This note was dictated using voice recognition software.  Any grammatical or context distortions are unintentional and inherent to the software. The note is tailored to serve physicians for communications among them, who knows what are the most important elements of history taken for disease diagnosis and differentials as well as management plans. Due to time factors, the notes are in general not reviewed before signing. Again due to time factor, follow-up notes often carries over old notes if they are relevant, so most clinic time is dedicated to interviewing with patients and caregivers, on clinical assessment, coordinating care and management.         Again, thank you for allowing me to participate in the care of your patient.        Sincerely,        Melody Oneal MD

## 2020-12-14 NOTE — PROGRESS NOTES
NEUROLOGY NOTE        Assessment/Plan            Rare patient will recurrent spell of confusions.  Most likely TIA or transient global amnesia.  EEG showing no typical seizure activities.  MRI brain unremarkable.  There are some dysrhythmic theta slowing in the posterior temporal region but no definitive seizure activity detected.  Cannot rule out basilar artery migraine contributing to symptoms.    Mild plaques bilateral carotid by CTA of the neck.    Alcohol dependence, recommend no more than 1 glass of wine a day    History of hypertension    Bilateral carotid stenosis mild degree and in the right vertebral artery as well.     Plan:    Recommend reduce alcohol intake less than 1 glass of wine a day    Recommend to work with primary to keep LDL below 70 for secondary stroke prevention.  And in working with primary to keep blood pressure in normal range as well.    Continue aspirin may drop to 81 mg a day.    Follow-up with me at anytime with recurrent symptoms.  Otherwise follow-up with me in 6 months regarding bilateral carotid stenosis.    Avoid medications that may provoke seizures.               SUBJECTIVE         Geneva Bond is a 70 year old female seen for recurrent confusion spells.       Since last visit no recurrent spells.  EEG today 12/14/2020 showing no typical seizure activities.    He has a history of hypertension, vitamin D deficiency.      Had 2 episodes of confusion in late October and early November 2020, presented to ER on 11/2/2020.  She described a sensation of losing track of what she was doing, or feeling something washed over her, and cannot remember passwords, confused about what she is doing.  She had mild headache afterwards which resolved using Excedrin.  Each episode lasted 1 to 1.5 hours.  She was alone when things happened.  She did not feel lightheaded no auras.  No incontinence.  No recent head injuries.  No change of medication.     Does drink about 3 alcoholic beverages  "a day, reportedly 2 days a week.     No focal weakness or sensory difficulty.  No clear cranial nerve symptoms.  She did not talk not able to tell if she had any speech or language difficulties.     Work-ups:  11/2020: BMP, CBC unremarkable.  7/2020: Liver function normal.  .     IMPRESSION: MRI brain 12/5/2020.  1.  A few tiny nonspecific white matter changes.  2.  No evidence for intracranial hemorrhage, acute infarct, or any focal mass lesions.         IMPRESSION:   HEAD CT:  1.  No acute intracranial process.     HEAD CTA:   1.  Mild plaque within the carotid siphons and right vertebral artery.  2.  No stenosis or branch artery occlusion.  3.  No aneurysm or vascular malformation.     NECK CTA:  1.  Mild plaque at the carotid bifurcations without measurable stenosis of the internal carotid arteries.  2.  No vertebral artery stenosis.           Review of system     10 point system review otherwise unremarkable    PHYSICAL EXAMINATION     Vital signs in last 24 hours:  Vitals:    12/14/20 1216   BP: 126/80   Pulse: 73   Weight: 65.8 kg (145 lb)   Height: 1.626 m (5' 4\")       Very pleasant lady.  A little anxious looking.  Sitting in chair no acute distress.  No edema or skin rashes.  No breathing difficulty or JVD.  Thyroid gland slightly enlarged.  Normal mental status, language and speech.  Cranial nerves II through XII intact.  Normal muscle bulk, tone and strength in 4 extremities.  No clear focal sensory difficulty.       Problem List     Patient Active Problem List    Diagnosis Date Noted     Diverticulosis of large intestine 08/02/2017     Priority: Medium     Per colonoscopy 7/2017       Pain of right upper extremity 02/15/2016     Priority: Medium     HLD (hyperlipidemia) 02/15/2016     Priority: Medium     Gastroesophageal reflux disease without esophagitis 02/15/2016     Priority: Medium     Essential hypertension 02/15/2016     Priority: Medium     Alcohol dependence, daily use (H) 02/15/2016 "     Priority: Medium         Past medical history     History reviewed. No pertinent surgical history.    Past Medical History:   Diagnosis Date     Benign essential hypertension            Family history     Family History   Problem Relation Age of Onset     Cerebrovascular Disease Mother      Cerebrovascular Disease Father          Social history     Social History     Socioeconomic History     Marital status:      Spouse name: Not on file     Number of children: Not on file     Years of education: Not on file     Highest education level: Not on file   Occupational History     Not on file   Social Needs     Financial resource strain: Not on file     Food insecurity     Worry: Not on file     Inability: Not on file     Transportation needs     Medical: Not on file     Non-medical: Not on file   Tobacco Use     Smoking status: Former Smoker     Years: 17.00     Smokeless tobacco: Never Used   Substance and Sexual Activity     Alcohol use: Yes     Alcohol/week: 2.0 standard drinks     Types: 2 Glasses of wine per week     Frequency: 4 or more times a week     Drug use: Not on file     Sexual activity: Not on file   Lifestyle     Physical activity     Days per week: Not on file     Minutes per session: Not on file     Stress: Not on file   Relationships     Social connections     Talks on phone: Not on file     Gets together: Not on file     Attends Sabianism service: Not on file     Active member of club or organization: Not on file     Attends meetings of clubs or organizations: Not on file     Relationship status: Not on file     Intimate partner violence     Fear of current or ex partner: Not on file     Emotionally abused: Not on file     Physically abused: Not on file     Forced sexual activity: Not on file   Other Topics Concern     Parent/sibling w/ CABG, MI or angioplasty before 65F 55M? Not Asked   Social History Narrative     Not on file         Allergy     Ibuprofen and Atorvastatin    MEDICATIONS  List     Current Outpatient Medications   Medication Sig Dispense Refill     aspirin (ASA) 325 MG tablet Take 325 mg by mouth daily       Biotin 800 MCG TABS Take 1 tablet by mouth       cholecalciferol 25 MCG (1000 UT) TABS Take 1,000 Units by mouth       cyanocobalamin (VITAMIN B-12) 1000 MCG tablet Take by mouth daily       fenofibrate (TRICOR) 145 MG tablet Take 145 mg by mouth daily       lisinopril-hydrochlorothiazide (ZESTORETIC) 10-12.5 MG tablet Take 1 tablet by mouth       omeprazole (PRILOSEC) 20 MG DR capsule Take 20 mg by mouth       Thiamine HCl (B-1) 500 MG TABS                    Melody Oneal MD, MD, PhD  Neurology   Office tel: [phone]        This note was dictated using voice recognition software.  Any grammatical or context distortions are unintentional and inherent to the software. The note is tailored to serve physicians for communications among them, who knows what are the most important elements of history taken for disease diagnosis and differentials as well as management plans. Due to time factors, the notes are in general not reviewed before signing. Again due to time factor, follow-up notes often carries over old notes if they are relevant, so most clinic time is dedicated to interviewing with patients and caregivers, on clinical assessment, coordinating care and management.

## 2021-01-07 ENCOUNTER — COMMUNICATION - HEALTHEAST (OUTPATIENT)
Dept: FAMILY MEDICINE | Facility: CLINIC | Age: 71
End: 2021-01-07

## 2021-01-28 ENCOUNTER — COMMUNICATION - HEALTHEAST (OUTPATIENT)
Dept: FAMILY MEDICINE | Facility: CLINIC | Age: 71
End: 2021-01-28

## 2021-01-29 ENCOUNTER — AMBULATORY - HEALTHEAST (OUTPATIENT)
Dept: FAMILY MEDICINE | Facility: CLINIC | Age: 71
End: 2021-01-29

## 2021-01-29 ENCOUNTER — COMMUNICATION - HEALTHEAST (OUTPATIENT)
Dept: FAMILY MEDICINE | Facility: CLINIC | Age: 71
End: 2021-01-29

## 2021-01-29 DIAGNOSIS — E78.2 MIXED HYPERLIPIDEMIA: ICD-10-CM

## 2021-02-01 ENCOUNTER — AMBULATORY - HEALTHEAST (OUTPATIENT)
Dept: LAB | Facility: CLINIC | Age: 71
End: 2021-02-01

## 2021-02-01 DIAGNOSIS — E78.2 MIXED HYPERLIPIDEMIA: ICD-10-CM

## 2021-02-01 LAB
CHOLEST SERPL-MCNC: 185 MG/DL
FASTING STATUS PATIENT QL REPORTED: YES
HDLC SERPL-MCNC: 68 MG/DL
LDLC SERPL CALC-MCNC: 107 MG/DL
TRIGL SERPL-MCNC: 51 MG/DL

## 2021-02-08 ENCOUNTER — COMMUNICATION - HEALTHEAST (OUTPATIENT)
Dept: FAMILY MEDICINE | Facility: CLINIC | Age: 71
End: 2021-02-08

## 2021-02-08 DIAGNOSIS — E78.2 MIXED HYPERLIPIDEMIA: ICD-10-CM

## 2021-02-09 ENCOUNTER — COMMUNICATION - HEALTHEAST (OUTPATIENT)
Dept: FAMILY MEDICINE | Facility: CLINIC | Age: 71
End: 2021-02-09

## 2021-02-17 ENCOUNTER — COMMUNICATION - HEALTHEAST (OUTPATIENT)
Dept: FAMILY MEDICINE | Facility: CLINIC | Age: 71
End: 2021-02-17

## 2021-05-27 NOTE — PROGRESS NOTES
Assessment and Plan:     1. Encounter for Medicare annual wellness exam     2. Routine general medical examination at a health care facility  DXA Bone Density Scan    Vitamin D, Total (25-Hydroxy)   3. Hypertension  lisinopril-hydrochlorothiazide (PRINZIDE,ZESTORETIC) 10-12.5 mg per tablet    Comprehensive Metabolic Panel   4. Mixed hyperlipidemia  Comprehensive Metabolic Panel    Lipid Profile   5. Need for vaccination against Streptococcus pneumoniae  Pneumococcal polysaccharide vaccine 23-valent 1 yo or older, subq/IM   6. Need for shingles vaccine  Varicella Zoster, Recombinant Vaccine IM       This is a 69 yo female here for annual wellness visit/physical exam.    1.  Health Maintenance - patient is due for DEXA screening - will refer.  Also due for pneumonia vaccine and shingles vaccine - ordered.    2.  Hypertension - continue Lisinopril/HCTZ - check labs  3.  Hyperlipidemia - check labs    The patient's current medical problems were reviewed.    I have had an Advance Directives discussion with the patient.  The following health maintenance schedule was reviewed with the patient and provided in printed form in the after visit summary:   Health Maintenance   Topic Date Due     DXA SCAN  05/27/2017     INFLUENZA VACCINE RULE BASED (1) 08/01/2018     ZOSTER VACCINES (3 of 3) 05/20/2019     FALL RISK ASSESSMENT  03/25/2020     MAMMOGRAM  03/25/2021     ADVANCE DIRECTIVES DISCUSSED WITH PATIENT  03/21/2023     COLONOSCOPY  07/31/2027     TD 18+ HE  03/21/2028     PNEUMOCOCCAL POLYSACCHARIDE VACCINE AGE 65 AND OVER  Completed     PNEUMOCOCCAL CONJUGATE VACCINE FOR ADULTS (PCV13 OR PREVNAR)  Completed        Subjective:   Chief Complaint: Geneva Bond is an 68 y.o. female here for an Annual Wellness visit.   HPI:     Regular dental visits  No regular sunscreen  No regular exercise - discussed getting out and moving  Started taking Biotin - for hair/skin/nails - worked well for splitting nails over winter  No  "hair loss    On BP meds -  Not taking lipid medicine - had \"serious side effects\" from that medication - muscle pain , couldn't use right arm (lost muscle control) - within 2 weeks of starting medication  Has had high cholesterol since age 40s    Takes Omeprazole twice a week - for heartburn  Has swallowing issues again -     Drinks red wine - 2 glasses nightly    Discussed advanced directive    Review of Systems:    Please see above.  The rest of the review of systems are negative for all systems.    Patient Care Team:  Michelle Hogue MD as PCP - General (Family Medicine)     Patient Active Problem List   Diagnosis     Essential hypertension     HLD (hyperlipidemia)     Gastroesophageal reflux disease without esophagitis     Pain of right upper extremity     Alcohol dependence, daily use (H)     Diverticulosis of large intestine     History reviewed. No pertinent past medical history.   Past Surgical History:   Procedure Laterality Date     LAPAROSCOPIC CHOLECYSTECTOMY  2013      Family History   Problem Relation Age of Onset     Hypertension Mother      Diabetes type II Mother      Breast cancer Maternal Aunt 67     Uterine cancer Maternal Aunt      Uterine cancer Maternal Grandmother       Social History     Socioeconomic History     Marital status: Single     Spouse name: Not on file     Number of children: Not on file     Years of education: Not on file     Highest education level: Not on file   Occupational History     Occupation: teacher     Employer: [employer]     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[employer] [place]   Social Needs     Financial resource strain: Not on file     Food insecurity:     Worry: Not on file     Inability: Not on file     Transportation needs:     Medical: Not on file     Non-medical: Not on file   Tobacco Use     Smoking status: Former Smoker     Packs/day: 3.00     Years: 17.00     Pack years: 51.00     Last attempt to quit: 12/30/1987     " "Years since quittin.2     Smokeless tobacco: Never Used   Substance and Sexual Activity     Alcohol use: Yes     Comment: 2-5 glasses wine/night     Drug use: No     Sexual activity: No   Lifestyle     Physical activity:     Days per week: Not on file     Minutes per session: Not on file     Stress: Not on file   Relationships     Social connections:     Talks on phone: Not on file     Gets together: Not on file     Attends Yarsanism service: Not on file     Active member of club or organization: Not on file     Attends meetings of clubs or organizations: Not on file     Relationship status: Not on file     Intimate partner violence:     Fear of current or ex partner: Not on file     Emotionally abused: Not on file     Physically abused: Not on file     Forced sexual activity: Not on file   Other Topics Concern     Not on file   Social History Narrative     Not on file      Current Outpatient Medications   Medication Sig Dispense Refill     calcium, as carbonate, (OS-YVETTE) 500 mg calcium (1,250 mg) tablet Take by mouth.       lisinopril-hydrochlorothiazide (PRINZIDE,ZESTORETIC) 10-12.5 mg per tablet Take 1 tablet by mouth daily. 90 tablet 4     omeprazole (PRILOSEC) 20 MG capsule Take 20 mg by mouth daily.       No current facility-administered medications for this visit.       Objective:   Vital Signs:   Visit Vitals  /70 (Patient Site: Right Arm, Patient Position: Sitting, Cuff Size: Adult Regular)   Pulse 69   Temp 97.6  F (36.4  C) (Oral)   Resp 20   Ht 5' 3.75\" (1.619 m)   Wt 151 lb 0.3 oz (68.5 kg)   LMP 2007   SpO2 97%   Breastfeeding? No   BMI 26.13 kg/m         VisionScreening:  No exam data present     PHYSICAL EXAM  EXAM:  /70 (Patient Site: Right Arm, Patient Position: Sitting, Cuff Size: Adult Regular)   Pulse 69   Temp 97.6  F (36.4  C) (Oral)   Resp 20   Ht 5' 3.75\" (1.619 m)   Wt 151 lb 0.3 oz (68.5 kg)   LMP 2007   SpO2 97%   Breastfeeding? No   BMI 26.13 kg/m   "   Gen:  NAD, appears well, well-hydrated  HEENT:  TMs nl, oropharynx benign, nasal mucosa nl, conjunctiva clear  Neck:  Supple, no adenopathy, no thyromegaly, no carotid bruits, no JVD  Lungs:  Clear to auscultation bilaterally  Breast exam:  No breast lumps, no skin changes, no nipple discharge, no axillary adenopathy  Cor:  RRR no murmur  Abd:  Soft, nontender, BS+, no masses, no guarding or rebound, no HSM  Extr: mild DJD - knees  Neuro:  No asymmetry, Nl motor tone/strength, nl sensation, reflexes =, gait nl, nl coordination, CN intact,   Skin:  Warm/dry        Assessment Results 3/25/2019   Activities of Daily Living No help needed   Instrumental Activities of Daily Living No help needed   Mini Cog Total Score 4   Some recent data might be hidden     A Mini-Cog score of 0-2 suggests the possibility of dementia, score of 3-5 suggests no dementia    Identified Health Risks:     She is at risk for lack of exercise and has been provided with information to increase physical activity for the benefit of her well-being.  Information regarding advance directives (living ochoa), including where she can download the appropriate form, was provided to the patient via the AVS.

## 2021-05-30 VITALS — BODY MASS INDEX: 25.18 KG/M2 | WEIGHT: 147.5 LBS | HEIGHT: 64 IN

## 2021-06-01 VITALS — BODY MASS INDEX: 25.78 KG/M2 | HEIGHT: 64 IN | WEIGHT: 151 LBS

## 2021-06-01 NOTE — TELEPHONE ENCOUNTER
Caller states she has a cough which began Labor Day. It began with a cold, scratchy throat and laryngitis. Her voice is now back, but the cough continues. Sometimes she coughs repetitively, and can have difficulty catching her breath.. Her phlegm has been clear and just a small amount. Nothing coming out of her nose. Cough is not usually productive. No fever noted.     Reason for Disposition    SEVERE coughing spells (e.g., whooping sound after coughing, vomiting after coughing)    [1] Continuous (nonstop) coughing interferes with work or school AND [2] no improvement using cough treatment per protocol    Protocols used: COUGH - ACUTE NON-PRODUCTIVE-A-AH    Triaged to a disposition of see physician within 24 hrs. Home care given per guideline.   Call transferred to .     Rosalie Cardona RN [facility] Triage Nurse

## 2021-06-02 VITALS — BODY MASS INDEX: 25.78 KG/M2 | WEIGHT: 151.02 LBS | HEIGHT: 64 IN

## 2021-06-03 VITALS
SYSTOLIC BLOOD PRESSURE: 126 MMHG | BODY MASS INDEX: 26.12 KG/M2 | TEMPERATURE: 98.7 F | DIASTOLIC BLOOD PRESSURE: 60 MMHG | OXYGEN SATURATION: 98 % | HEART RATE: 74 BPM | WEIGHT: 151 LBS

## 2021-06-04 VITALS
HEIGHT: 64 IN | WEIGHT: 145 LBS | HEART RATE: 62 BPM | DIASTOLIC BLOOD PRESSURE: 78 MMHG | RESPIRATION RATE: 18 BRPM | SYSTOLIC BLOOD PRESSURE: 135 MMHG | TEMPERATURE: 98.3 F | BODY MASS INDEX: 24.75 KG/M2

## 2021-06-09 NOTE — PROGRESS NOTES
Assessment:      Healthy female exam.    1. Routine general medical examination at a health care facility  Ambulatory referral for Colonoscopy    Gynecologic Cytology (PAP Smear)    HPV Cascade (PCR)   2. Hypertension  lisinopril-hydrochlorothiazide (PRINZIDE,ZESTORETIC) 10-12.5 mg per tablet    Basic Metabolic Panel   3. Need for zoster vaccination  Varicella-zoster vaccine subq   4. Mixed hyperlipidemia  Lipid Profile    Hepatic Profile   5. Gastroesophageal reflux disease without esophagitis  Hemoglobin          Plan:      65 yo female - here for physical exam.  History of hypertension - well controlled today.  Reviewed preventive maintenance - pap smear completed today (if normal, no further pap smears).  Referred to colonoscopy.  Mammogram today.  Will provide zoster vaccine today. Other labs in follow up as noted.     Subjective:      Geneva Bond is a 66 y.o. female who presents for an annual exam. The patient reports that there is not domestic violence in her life.     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    No exam data present    Gynecologic History  Patient's last menstrual period was 2007.  Contraception: post menopausal  "status  Last Pap: . Results were: normal  Last mammogram: ? Or more ago. Results were: normal      OB History    Para Term  AB SAB TAB Ectopic Multiple Living   3 3 3       3      # Outcome Date GA Lbr Jono/2nd Weight Sex Delivery Anes PTL Lv   3 Term            2 Term            1 Term                   Current Outpatient Prescriptions   Medication Sig Dispense Refill     lisinopril-hydrochlorothiazide (PRINZIDE,ZESTORETIC) 10-12.5 mg per tablet Take 1 tablet by mouth daily. 90 tablet 4     omeprazole (PRILOSEC) 20 MG capsule Take 20 mg by mouth daily.       No current facility-administered medications for this visit.      History reviewed. No pertinent past medical history.  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        Objective:         Vitals:    17 1032   BP: 120/70   Pulse: 68   Resp: 14   Temp: 97.6  F (36.4  C)   TempSrc: Oral   Weight: 147 lb 8 oz (66.9 kg)   Height: 5' 4\" (1.626 m)     Body mass index is " "25.32 kg/(m^2).    Physical  Physical Exam      EXAM:  Visit Vitals     /70     Pulse 68     Temp 97.6  F (36.4  C) (Oral)     Resp 14     Ht 5' 4\" (1.626 m)     Wt 147 lb 8 oz (66.9 kg)     LMP 02/20/2007     BMI 25.32 kg/m2      Gen:  NAD, appears well, well-hydrated  HEENT:  TMs nl, oropharynx benign, nasal mucosa nl, conjunctiva clear  Neck:  Supple, no adenopathy, no thyromegaly, no carotid bruits, no JVD  Lungs:  Clear to auscultation bilaterally  Breast exam:  No breast lumps, no skin changes, no nipple discharge, no axillary adenopathy  Cor:  RRR no murmur  Abd:  Soft, nontender, BS+, no masses, no guarding or rebound, no HSM  PELVIC EXAM:External genitalia: normal  Vaginal mucosa normal  Vaginal discharge: white  Speculum exam shows a normal appearing cervix .   Bimanual exam: Cervix closed, firm, non tender  to motion.  Uterus  firm, regular, mobile, non tender to palpation. No adnexal masses or tenderness.   Extr:  Neg.  Neuro:  No asymmetry  Skin:  Warm/dry        "

## 2021-06-09 NOTE — PROGRESS NOTES
Assessment and Plan:     1. Encounter for Medicare annual wellness exam     2. Routine general medical examination at a health care facility  biotin 800 mcg Tab   3. Hypertension  Lipid [place] FASTING    Comprehensive Metabolic Panel    lisinopriL-hydrochlorothiazide (PRINZIDE,ZESTORETIC) 10-12.5 mg per tablet   4. Vitamin D deficiency  Vitamin D, Total (25-Hydroxy)   5. Skin lesion  Ambulatory referral to Dermatology   6. Encounter for HCV screening test for low risk patient  Hepatitis C Antibody (Anti-HCV)     This is a 69 yo female here for AWV/exam:  1.  Hypertension:  Blood pressure is well controlled - takes Lisinopril/HCTZ.  Continue same. Recheck labs.  2.  Vitamin D deficiency - takes vitamin D supplements - check levels  3.  Skin lesion - patient has skin lesion on face of concern - will refer to Derm  4.  Health Maintenance - due for hepatitis C antibody    The patient's current medical problems were reviewed.    I have had an Advance Directives discussion with the patient.  The following health maintenance schedule was reviewed with the patient and provided in printed form in the after visit summary:   Health Maintenance   Topic Date Due     DXA SCAN  05/27/2017     INFLUENZA VACCINE RULE BASED (1) 08/01/2020     MAMMOGRAM  03/25/2021     MEDICARE ANNUAL WELLNESS VISIT  07/15/2021     FALL RISK ASSESSMENT  07/15/2021     ADVANCE CARE PLANNING  03/25/2024     LIPID  07/15/2025     COLORECTAL CANCER SCREENING  07/31/2027     TD 18+ HE  03/21/2028     HEPATITIS C SCREENING  Completed     PNEUMOCOCCAL IMMUNIZATION 65+ LOW/MEDIUM RISK  Completed     ZOSTER VACCINES  Completed     HEPATITIS B VACCINES  Aged Out        Subjective:   Chief Complaint: Geneva Bond is an 70 y.o. female here for an Annual Wellness visit.   HPI:    Made changes to diet/exercise in last year  Walks 4-5 x/week x 1 hour    Lesion right face below medial right eye - getting bigger    Virtual teaching -       Review of Systems:     Please see above.  The rest of the review of systems are negative for all systems.    Patient Care Team:  Michelle Hogue MD as PCP - General (Family Medicine)  Michelle Hogue MD as Assigned PCP     Patient Active Problem List   Diagnosis     Essential hypertension     HLD (hyperlipidemia)     Gastroesophageal reflux disease without esophagitis     Pain of right upper extremity     Alcohol dependence, daily use (H)     Diverticulosis of large intestine     History reviewed. No pertinent past medical history.   Past Surgical History:   Procedure Laterality Date     LAPAROSCOPIC CHOLECYSTECTOMY        Family History   Problem Relation Age of Onset     Hypertension Mother      Diabetes type II Mother      Breast cancer Maternal Aunt 67     Uterine cancer Maternal Aunt      Uterine cancer Maternal Grandmother       Social History     Socioeconomic History     Marital status: Single     Spouse name: Not on file     Number of children: Not on file     Years of education: Not on file     Highest education level: Not on file   Occupational History     Occupation: teacher     Employer: 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[employer] [place]   Social Needs     Financial resource strain: Not on file     Food insecurity     Worry: Not on file     Inability: Not on file     Transportation needs     Medical: Not on file     Non-medical: Not on file   Tobacco Use     Smoking status: Former Smoker     Packs/day: 3.00     Years: 17.00     Pack years: 51.00     Last attempt to quit: 1987     Years since quittin.5     Smokeless tobacco: Never Used   Substance and Sexual Activity     Alcohol use: Yes     Comment: 2-5 glasses wine/night     Drug use: No     Sexual activity: Never   Lifestyle     Physical activity     Days per week: Not on file     Minutes per session: Not on file     Stress: Not on file   Relationships     Social connections     Talks on  "phone: Not on file     Gets together: Not on file     Attends Jehovah's witness service: Not on file     Active member of club or organization: Not on file     Attends meetings of clubs or organizations: Not on file     Relationship status: Not on file     Intimate partner violence     Fear of current or ex partner: Not on file     Emotionally abused: Not on file     Physically abused: Not on file     Forced sexual activity: Not on file   Other Topics Concern     Not on file   Social History Narrative     Not on file      Current Outpatient Medications   Medication Sig Dispense Refill     calcium, as carbonate, (OS-YVETTE) 500 mg calcium (1,250 mg) tablet Take by mouth.       lisinopriL-hydrochlorothiazide (PRINZIDE,ZESTORETIC) 10-12.5 mg per tablet Take 1 tablet by mouth daily. 90 tablet 4     omeprazole (PRILOSEC) 20 MG capsule Take 20 mg by mouth daily.       biotin 800 mcg Tab Take 1 tablet by mouth daily.  0     No current facility-administered medications for this visit.       Objective:   Vital Signs:   Visit Vitals  /78 (Patient Site: Left Arm, Patient Position: Sitting, Cuff Size: Adult Regular)   Pulse 62   Temp 98.3  F (36.8  C) (Tympanic)   Resp 18   Ht 5' 4\" (1.626 m)   Wt 145 lb (65.8 kg)   LMP 02/20/2007   BMI 24.89 kg/m           VisionScreening:  No exam data present     PHYSICAL EXAM  EXAM:  /78 (Patient Site: Left Arm, Patient Position: Sitting, Cuff Size: Adult Regular)   Pulse 62   Temp 98.3  F (36.8  C) (Tympanic)   Resp 18   Ht 5' 4\" (1.626 m)   Wt 145 lb (65.8 kg)   LMP 02/20/2007   BMI 24.89 kg/m     Gen:  NAD, appears well, well-hydrated  HEENT:  TMs nl, oropharynx benign, nasal mucosa nl, conjunctiva clear  Neck:  Supple, no adenopathy, no thyromegaly, no carotid bruits, no JVD  Lungs:  Clear to auscultation bilaterally  Breast exam:  No breast lumps, no skin changes, no nipple discharge, no axillary adenopathy  Cor:  RRR no murmur  Abd:  Soft, nontender, BS+, no masses, no " guarding or rebound, no HSM  Extr:  Neg.  Neuro:  No asymmetry, Nl motor tone/strength, nl sensation, reflexes =, gait nl, nl coordination, CN intact,   Skin:  Warm/dry, growing lesion on right face (below eye)      Assessment Results 7/15/2020   Activities of Daily Living No help needed   Instrumental Activities of Daily Living No help needed   Mini Cog Total Score 5   Some recent data might be hidden     A Mini-Cog score of 0-2 suggests the possibility of dementia, score of 3-5 suggests no dementia  Fall risk:  none  Cognitive screen:  normal    Identified Health Risks:     She is at risk for lack of exercise and has been provided with information to increase physical activity for the benefit of her well-being.  The patient reports that she drinks more than one alcoholic drink per day but denies binge or excessive drinking. She was counseled and given information about possible harmful effects of excessive alcohol intake.  Information on urinary incontinence and treatment options given to patient.  Information regarding advance directives (living ochoa), including where she can download the appropriate form, was provided to the patient via the AVS.

## 2021-06-12 NOTE — TELEPHONE ENCOUNTER
"Has had 2 episodes of memory loss , confusion, disorientation, and memory concerns/ that lasted 1-2 hours.  One was at work when she could not remember a phone number, and another when she was working from her home/ no other symptoms/one on 10/29 and the other one over the weekend/describes it as a \"brain fog\" wants to see a neurologist/ sent to scheduling for an appointment with her primary MD initially with the idea she will get a referral at that time for a neurologist/no known head injury  sounds very coherent at this point and by her own admission she is fine right now. Called back/ she could not get a call back from the Dr until tomorrow/she was cautioned if she gets another episode or gets worse she should have someone drive her to the er right away  Or an urgent care and she will do that FABBY Patrick    Additional Information    Negative: [1] Difficult to awaken or acting confused (e.g., disoriented, slurred speech) AND [2] present now AND [3] new onset AND [4] has diabetes (diabetes mellitus)    Negative: [1] Difficult to awaken or acting confused (e.g., disoriented, slurred speech) AND [2] present now AND [3] new onset    Negative: [1] Weakness of the face, arm, or leg on one side of the body AND [2] new onset    Negative: [1] Numbness of the face, arm, or leg on one side of the body AND [2] new onset    Negative: [1] Loss of speech or garbled speech AND [2] new onset    Negative: Shock suspected (e.g., cold/pale/clammy skin, too weak to stand, low BP, rapid pulse)    Negative: Sounds like a life-threatening emergency to the triager    [1] New onset confusion AND [2] no prior diagnosis of dementia    Negative: [1] Difficult to awaken or acting confused (e.g., disoriented, slurred speech) AND [2] present now AND [3] has diabetes (diabetes mellitus)    Negative: [1] Difficult to awaken or acting confused (e.g., disoriented, slurred speech) AND [2] present now AND [3] new onset    Negative: [1] Weakness of " the face, arm, or leg on one side of the body AND [2] new onset    Negative: [1] Numbness of the face, arm, or leg on one side of the body AND [2] new onset    Negative: [1] Loss of speech or garbled speech AND [2] new onset    Negative: Difficulty breathing or bluish lips    Negative: Shock suspected (e.g., cold/pale/clammy skin, too weak to stand, low BP, rapid pulse)    Negative: Seeing, hearing, or feeling things that are not there (i.e., visual, auditory, or tactile hallucinations)    Negative: Followed a head injury    Negative: Drug overdose suspected    Negative: Sounds like a life-threatening emergency to the triager    Negative: Alcohol use, abuse or dependence: question or problem related to    Negative: Drug abuse or dependence: question or problem related to    Negative: Headache or vomiting    Negative: Stiff neck (can't touch chin to chest)    Negative: Very strange or paranoid behavior    Negative: Fever > 100.5 F (38.1 C)    Negative: Patient sounds very sick or weak to the triager    [1] Acting confused (e.g., disoriented, slurred speech) AND [2] brief (now gone)    Protocols used: DEMENTIA SYMPTOMS AND [id number], CONFUSION - DELIRIUM-A-AH

## 2021-06-12 NOTE — PROGRESS NOTES
Patient came in for a pneumoccal vaccination.  Patient tolerated well.    Brooke Quezada, [facility]

## 2021-06-12 NOTE — PROGRESS NOTES
"Geneva Bond is a 70 y.o. female who is being evaluated via a billable video visit.      The patient has been notified of following:     \"This video visit will be conducted via a call between you and your physician/provider. We have found that certain health care needs can be provided without the need for an in-person physical exam.  This service lets us provide the care you need with a video conversation.  If a prescription is necessary we can send it directly to your pharmacy.  If lab work is needed we can place an order for that and you can then stop by our lab to have the test done at a later time.    Video visits are billed at different rates depending on your insurance coverage. Please reach out to your insurance provider with any questions.    If during the course of the call the physician/provider feels a video visit is not appropriate, you will not be charged for this service.\"    Patient has given verbal consent to a Video visit? Yes  How would you like to obtain your AVS? AVS Preference: MyChart.  If dropped by the video visit, the video invitation should be sent to: Text to cell phone: [phone]  Will anyone else be joining your video visit? No        Video Start Time: 2:12 PM    Additional provider notes:     ASSESSMENT/PLAN:  1. Spell of altered consciousness  Ambulatory referral to Neurology   2. Mixed hyperlipidemia  fenofibrate (TRICOR) 145 MG tablet       This is a 69 yo female with:  1.  Spell of altered consciousness - actually patient reports 2 separate spells where she had unusual symptoms.  She was prompted to seek medical attention by a daughter (who is a medical professional).  She expresses that she is very much worried about her clogged carotid arteries - especially since she feels some \"thumping\" in the neck at times.  (not necessarily in associated with these events).     I have reviewed available ER records,  notes, as well as imaging and lab results.  In addition I have " "reviewed the medication list and made any adjustments as indicated in orders.  I have reviewed her CT angio at length with her.  I have sent her a printed copy of the result.  Her daughter is insistent that she get the \"disc\" and let her daughter read it.  She will contact [facility] related to this.    I have directed her to a referral to Neurology.  Her workup so far has been negative.  No further spells. Would have her take ASA daily and control lipids.   2.  Hyperlipidemia - I think it is reasonable for this patient to control her lipids, especially in light of #1.  Will start Fenofibrate as she recalls being \"allergic\" to Atorvastatin and does not wish to have statin therapy.  We will start Fenofibrate.      Return in about 1 month (around 12/9/2020) for Recheck.      There are no discontinued medications.  There are no Patient Instructions on file for this visit.    Chief Complaint:  Chief Complaint   Patient presents with     Follow-up     Referral       HPI:   Geneva Bond is a 70 y.o. female c/o  Had 2 episodes -   First - lost memory - was \"lost in the forest\" - took an hour or hour and a half to gain normal  Then 3 days later - had a similar but more gradual episode -     Was talking with her daughters, so they sent her in -   Seen in ER  Concerned about her carotids  Occasionally feels \"throbbing\"  Ate more chocolate, or extra glass of wine    Has a daughter that is a PA and works in emergency -   Thinks that this is \"leading up to something\"    Still feels like she is not as clear and as sharp as previous  Wonders if she has Alzheimers    Took cholesterol medications 2-3 years ago - went down dramatically in 3 weeks but also had significant side effects -   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  Like she had no muscle   Allergies section in chart suggests \"couldn't use right arm\"    Doesn't recall if it was actually both sides  Hasn't taken any cholesterol " medication    Takes 325 mg ASA daily - just started after these episodes       PMH:   Patient Active Problem List    Diagnosis Date Noted     Diverticulosis of large intestine 2017     Essential hypertension 02/15/2016     HLD (hyperlipidemia) 02/15/2016     Gastroesophageal reflux disease without esophagitis 02/15/2016     Pain of right upper extremity 02/15/2016     Alcohol dependence, daily use (H) 02/15/2016     No past medical history on file.  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[employer] [place]   Social Needs     Financial resource strain: Not on file     Food insecurity     Worry: Not on file     Inability: Not on file     Transportation needs     Medical: Not on file     Non-medical: Not on file   Tobacco Use     Smoking status: Former Smoker     Packs/day: 3.00     Years: 17.00     Pack years: 51.00     Quit date: 1987     Years since quittin.9     Smokeless tobacco: Never Used   Substance and Sexual Activity     Alcohol use: Yes     Comment: 2-5 glasses wine/night     Drug use: No     Sexual activity: Never   Lifestyle     Physical activity     Days per week: Not on file     Minutes per session: Not on file     Stress: Not on file   Relationships     Social connections     Talks on phone: Not on file     Gets together: Not on file     Attends Faith service: Not on file     Active member of club or organization: Not on file     Attends meetings of clubs or organizations: Not on file     Relationship status: Not on file     Intimate partner violence     Fear of current or ex partner: Not on  "file     Emotionally abused: Not on file     Physically abused: Not on file     Forced sexual activity: Not on file   Other Topics Concern     Not on file   Social History Narrative     Not on file     Family History   Problem Relation Age of Onset     Hypertension Mother      Diabetes type II Mother      Breast cancer Maternal Aunt 67     Uterine cancer Maternal Aunt      Uterine cancer Maternal Grandmother        Meds:    Current Outpatient Medications:      biotin 800 mcg Tab, Take 1 tablet by mouth daily., Disp: , Rfl: 0     calcium, as carbonate, (OS-YVETTE) 500 mg calcium (1,250 mg) tablet, Take 1 tablet by mouth daily. , Disp: , Rfl:      cholecalciferol, vitamin D3, 1,000 unit (25 mcg) tablet, Take 1,000 Units by mouth daily., Disp: , Rfl:      lisinopriL-hydrochlorothiazide (PRINZIDE,ZESTORETIC) 10-12.5 mg per tablet, Take 1 tablet by mouth daily., Disp: 90 tablet, Rfl: 4     omeprazole (PRILOSEC) 20 MG capsule, Take 20 mg by mouth daily., Disp: , Rfl:      fenofibrate (TRICOR) 145 MG tablet, Take 1 tablet (145 mg total) by mouth daily., Disp: 30 tablet, Rfl: 2    Allergies:  Allergies   Allergen Reactions     Advil [Ibuprofen] Hives     \"I have taken Advil twice and both times noticed a rash on my neck.  I have not taken it in many years now.\"     Atorvastatin Myalgia     Couldn't use right arm       ROS:  Pertinent positives as noted in HPI; otherwise 12 point ROS negative.      Physical Exam:  EXAM:  LMP 02/20/2007    Gen:  NAD, appears well, well-hydrated  HEENT:  conjunctiva clear, EOMI  Extr:  Neg.  Neuro:  No asymmetry  Skin:  Warm/dry        Results:  Results for orders placed or performed during the hospital encounter of 11/02/20   Culture, Urine    Specimen: Urine   Result Value Ref Range    Culture No Growth    HM2(CBC w/o Differential)   Result Value Ref Range    WBC 7.4 4.0 - 11.0 thou/uL    RBC 4.98 3.80 - 5.40 mill/uL    Hemoglobin 13.9 12.0 - 16.0 g/dL    Hematocrit 42.7 35.0 - 47.0 %    MCV 86 " 80 - 100 fL    MCH 27.9 27.0 - 34.0 pg    MCHC 32.6 32.0 - 36.0 g/dL    RDW 13.5 11.0 - 14.5 %    Platelets 279 140 - 440 thou/uL    MPV 10.2 8.5 - 12.5 fL   Basic Metabolic Panel   Result Value Ref Range    Sodium 140 136 - 145 mmol/L    Potassium 3.5 3.5 - 5.0 mmol/L    Chloride 102 98 - 107 mmol/L    CO2 26 22 - 31 mmol/L    Anion Gap, Calculation 12 5 - 18 mmol/L    Glucose 88 70 - 125 mg/dL    Calcium 10.2 8.5 - 10.5 mg/dL    BUN 20 8 - 22 mg/dL    Creatinine 0.73 0.60 - 1.10 mg/dL    GFR MDRD Af Amer >60 >60 mL/min/1.73m2    GFR MDRD Non Af Amer >60 >60 mL/min/1.73m2   Troponin I   Result Value Ref Range    Troponin I <0.01 0.00 - 0.29 ng/mL   Magnesium   Result Value Ref Range    Magnesium 2.2 1.8 - 2.6 mg/dL   Urinalysis-UC if Indicated   Result Value Ref Range    Color, UA Straw Colorless, Yellow, Straw, Light Yellow    Clarity, UA Clear Clear    Glucose, UA Negative Negative    Bilirubin, UA Negative Negative    Ketones, UA Negative Negative    Specific Gravity, UA 1.005 1.001 - 1.030    Blood, UA Negative Negative    pH, UA 5.0 4.5 - 8.0    Protein, UA Negative Negative mg/dL    Urobilinogen, UA <2.0 E.U./dL <2.0 E.U./dL, 2.0 E.U./dL    Nitrite, UA Negative Negative    Leukocytes, UA Trace (!) Negative    Bacteria, UA None Seen None Seen hpf    RBC, UA 0-2 None Seen, 0-2 hpf    WBC, UA 0-5 None Seen, 0-5 hpf    Squam Epithel, UA 0-5 None Seen, 0-5 lpf   ETOH Level   Result Value Ref Range    Alcohol, Blood <10 None detected mg/dL   ECG 12 lead nursing unit performed   Result Value Ref Range    SYSTOLIC BLOOD PRESSURE      DIASTOLIC BLOOD PRESSURE      VENTRICULAR RATE 72 BPM    ATRIAL RATE 72 BPM    P-R INTERVAL 156 ms    QRS DURATION 76 ms    Q-T INTERVAL 372 ms    QTC CALCULATION (BEZET) 407 ms    P Axis 68 degrees    R AXIS 52 degrees    T AXIS 28 degrees    MUSE DIAGNOSIS       Normal sinus rhythm  Normal ECG  No previous ECGs available  Confirmed by SEE ED PROVIDER NOTE FOR, ECG INTERPRETATION  ([id number]),  DAQUAN GONZALEZ ([id number]) on 11/2/2020 3:00:30 PM         Cta Head And Neck    Result Date: 11/2/2020  EXAM: CTA HEAD AND NECK LOCATION: [facility] DATE/TIME: 11/2/2020 4:11 PM INDICATION: Intermittent memory changes, confusion, family hx of severe carotid disease. COMPARISON: None. CONTRAST: Iohexol (Omni) 75 mL. TECHNIQUE: Head and neck CT angiogram with IV contrast. Noncontrast head CT followed by axial helical CT images of the head and neck vessels obtained during the arterial phase of intravenous contrast administration. Axial 2D reconstructed images and multiplanar 3D MIP reconstructed images of the head and neck vessels were performed by the technologist. Dose reduction techniques were used. All stenosis measurements made according to NASCET criteria unless otherwise specified. FINDINGS: NONCONTRAST HEAD CT: INTRACRANIAL CONTENTS: No intracranial hemorrhage, extraaxial collection, or mass effect. No CT evidence of acute infarct. Normal parenchymal attenuation. Normal ventricles and sulci. VISUALIZED ORBITS/SINUSES/MASTOIDS: No intraorbital abnormality. Focal opacification left anterior ethmoid sinus. The paranasal sinuses are otherwise clear. No middle ear or mastoid effusion. BONES/SOFT TISSUES: No acute abnormality. HEAD CTA: ANTERIOR CIRCULATION: Mild calcified plaque within the carotid siphons. No stenosis or branch artery occlusion. No aneurysm or vascular malformation. Standard [place] anatomy. POSTERIOR CIRCULATION: Mild calcified plaque right vertebral artery. Stenosis or branch artery occlusion. No aneurysm or vascular malformation. Dominant right and smaller left vertebral artery contribute to a normal basilar artery. DURAL VENOUS SINUSES: Expected enhancement of the major dural venous sinuses. NECK CTA: RIGHT CAROTID: Focal calcified plaque at the carotid bifurcation without measurable stenosis of the internal carotid artery. LEFT CAROTID: Mild  calcified plaque at the carotid bifurcation without measurable stenosis of the internal carotid artery. VERTEBRAL ARTERIES: No focal stenosis or dissection. Balanced vertebral arteries. AORTIC ARCH: Classic aortic arch anatomy with no significant stenosis at the origin of the great vessels. NONVASCULAR STRUCTURES: Severe disc degeneration at C5-C6. Moderate to severe facet arthropathy at C3-C4 and C4-C5.     HEAD CT: 1.  No acute intracranial process. HEAD CTA: 1.  Mild plaque within the carotid siphons and right vertebral artery. 2.  No stenosis or branch artery occlusion. 3.  No aneurysm or vascular malformation. NECK CTA: 1.  Mild plaque at the carotid bifurcations without measurable stenosis of the internal carotid arteries. 2.  No vertebral artery stenosis.          Video-Visit Details    Type of service:  Video Visit    Video End Time (time video stopped): 2:37 PM  Originating Location (pt. Location): Home    Distant Location (provider location):  [facility]     Platform used for Video Visit: Lucho Hogue MD

## 2021-06-14 ENCOUNTER — RECORDS - HEALTHEAST (OUTPATIENT)
Dept: FAMILY MEDICINE | Facility: CLINIC | Age: 71
End: 2021-06-14

## 2021-06-15 NOTE — TELEPHONE ENCOUNTER
Refill Approved    Rx renewed per Medication Renewal Policy. Medication was last renewed on 11/9/20.    James Fajardo, [facility] Triage/Med Refill 2/8/2021     Requested Prescriptions   Pending Prescriptions Disp Refills     fenofibrate (TRICOR) 145 MG tablet [Pharmacy Med Name: Fenofibrate 145 MG Oral Tablet] 90 tablet 0     Sig: Take 1 tablet by mouth once daily       Fenofibrate Refill Protocol Passed - 2/8/2021 10:42 AM        Passed - Renal status in last 6 months     Creatinine   Date Value Ref Range Status   11/02/2020 0.73 0.60 - 1.10 mg/dL Final             Passed - Fasting lipid cascade in last 12 months     Cholesterol   Date Value Ref Range Status   02/01/2021 185 <=199 mg/dL Final     Triglycerides   Date Value Ref Range Status   02/01/2021 51 <=149 mg/dL Final     HDL Cholesterol   Date Value Ref Range Status   02/01/2021 68 >=50 mg/dL Final     LDL Calculated   Date Value Ref Range Status   02/01/2021 107 <=129 mg/dL Final     Patient Fasting > 8hrs?   Date Value Ref Range Status   02/01/2021 Yes  Final             Passed - AST or ALT in last 12 months     AST   Date Value Ref Range Status   07/15/2020 22 0 - 40 U/L Final     ALT   Date Value Ref Range Status   07/15/2020 19 0 - 45 U/L Final               Passed - PCP or prescribing provider visit in past 12 months       Last office visit with prescriber/PCP: Visit date not found OR same dept: Visit date not found OR same specialty: 9/13/2019 Maegan Jackson MD  Last physical: 7/15/2020 Last MTM visit: Visit date not found   Next visit within 3 mo: Visit date not found  Next physical within 3 mo: Visit date not found  Prescriber OR PCP: Michelle Hogue MD  Last diagnosis associated with med order: 1. Mixed hyperlipidemia  - fenofibrate (TRICOR) 145 MG tablet [Pharmacy Med Name: Fenofibrate 145 MG Oral Tablet]; Take 1 tablet by mouth once daily  Dispense: 90 tablet; Refill: 0    If protocol passes may refill for 12 months if  within 3 months of last provider visit (or a total of 15 months).

## 2021-06-15 NOTE — TELEPHONE ENCOUNTER
Patient also sent [employer] message for her prescription refill request, prefers 90 day supply.  Aissatou Crawford

## 2021-06-16 NOTE — PROGRESS NOTES
Assessment and Plan:     1. Encounter for Medicare annual wellness exam     2. Hypertension  lisinopril-hydrochlorothiazide (PRINZIDE,ZESTORETIC) 10-12.5 mg per tablet   3. Mixed hyperlipidemia  Lipid Profile   4. Gastroesophageal reflux disease without esophagitis     5. Essential hypertension  Comprehensive Metabolic Panel   6. Need for tetanus booster  Td, Preservative Free (green label)   7. Visit for screening mammogram           This is a 66 yo female here for physical exam/wellness visit.  No specific concerns today.  Chronic underlying medical conditions addressed include:  1.  Hypertension - blood pressure well controlled at 110/80 - takes Lisinopril/HCTZ - no change indicated.  2.  Hyperlipidemia - not on any medications - check labs  3.  GE Reflux - generally stable - takes Omeprazole as needed  4.  Health Maintenance - due for tetanus booster - given.  Due for screening mammogram - referred.     The patient's current medical problems were reviewed.    I have had an Advance Directives discussion with the patient.  The following health maintenance schedule was reviewed with the patient and provided in printed form in the after visit summary:   Health Maintenance   Topic Date Due     ADVANCE DIRECTIVES DISCUSSED WITH PATIENT  05/27/1970     DXA SCAN  05/27/2017     PNEUMOCOCCAL POLYSACCHARIDE VACCINE AGE 65 AND OVER  05/27/2017     INFLUENZA VACCINE RULE BASED (1) 08/01/2017     FALL RISK ASSESSMENT  03/21/2019     MAMMOGRAM  03/21/2020     COLONOSCOPY  07/31/2027     TD 18+ HE  03/21/2028     TDAP ADULT ONE TIME DOSE  Completed     PNEUMOCOCCAL CONJUGATE VACCINE FOR ADULTS (PCV13 OR PREVNAR)  Completed     ZOSTER VACCINE  Completed        Subjective:   Chief Complaint: Geneva Bond is an 67 y.o. female here for an Annual Wellness visit.   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    Review of  Systems:    Please see above.  The rest of the review of systems are negative for all systems.    Patient Care Team:  Michelle Hogue MD as PCP - General (Family Medicine)     Patient Active Problem List   Diagnosis     Essential hypertension     HLD (hyperlipidemia)     Gastroesophageal reflux disease without esophagitis     Pain of right upper extremity     Alcohol dependence, daily use     Diverticulosis of large intestine     History reviewed. No pertinent past medical history.   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       omeprazole (PRILOSEC) 20 MG capsule Take 20 mg by mouth daily.       lisinopril-hydrochlorothiazide (PRINZIDE,ZESTORETIC) 10-12.5 mg per tablet Take 1 tablet by mouth daily. 90 tablet 4     No current facility-administered medications for this visit.       Objective:   Vital Signs:   Visit Vitals     /80 (Patient Site: Right Arm, Patient Position: Sitting, Cuff Size: Adult  "Regular)     Pulse 64     Temp 98  F (36.7  C) (Oral)     Ht 5' 4\" (1.626 m)     Wt 151 lb (68.5 kg)     LMP 02/20/2007     Breastfeeding No     BMI 25.92 kg/m2        VisionScreening:  No exam data present     PHYSICAL EXAM  EXAM:  /80 (Patient Site: Right Arm, Patient Position: Sitting, Cuff Size: Adult Regular)  Pulse 64  Temp 98  F (36.7  C) (Oral)   Ht 5' 4\" (1.626 m)  Wt 151 lb (68.5 kg)  LMP 02/20/2007  Breastfeeding? No  BMI 25.92 kg/m2   Gen:  NAD, appears well, well-hydrated  HEENT:  TMs nl, oropharynx benign, nasal mucosa nl, conjunctiva clear  Neck:  Supple, no adenopathy, no thyromegaly, no carotid bruits, no JVD  Lungs:  Clear to auscultation bilaterally  Breast exam:  No breast lumps, no skin changes, no nipple discharge, no axillary adenopathy  Cor:  RRR no murmur  Abd:  Soft, nontender, BS+, no masses, no guarding or rebound, no HSM  Extr:  Neg.  Neuro:  No asymmetry, Nl motor tone/strength, nl sensation, reflexes =, gait nl, nl coordination, CN intact,   Skin:  Warm/dry        Assessment Results 3/21/2018   Activities of Daily Living No help needed   Instrumental Activities of Daily Living No help needed   Mini Cog Total Score 5   Some recent data might be hidden     A Mini-Cog score of 0-2 suggests the possibility of dementia, score of 3-5 suggests no dementia    Identified Health Risks:     She is at risk for lack of exercise and has been provided with information to increase physical activity for the benefit of her well-being.  Information regarding advance directives (living ochoa), including where she can download the appropriate form, was provided to the patient via the AVS.       "

## 2021-06-17 NOTE — PATIENT INSTRUCTIONS - HE
Patient Instructions by Maegan Jackson MD at 9/13/2019  4:00 PM     Author: Maegan Jackson MD Service: -- Author Type: Physician    Filed: 9/13/2019  4:35 PM Encounter Date: 9/13/2019 Status: Signed    : Maegan Jackson MD (Physician)       Patient Education   1. Most likely this is a virus, if you feel worse or have a fever then this could be a bacterial infection  2. Help your immune system with hydration, vitamin C and Zinc    Viral Upper Respiratory Illness with Wheezing (Adult)  You have a viral upper respiratory illness (URI), which is another term for the common cold. When the infection causes a lot of irritation, the air passages can go into spasm. This causes wheezing and shortness of breath.    This illness is contagious during the first few days. It is spread through the air by coughing and sneezing. It may also be spread by direct contact. This could be by touching the sick person and then touching your own eyes, nose, or mouth. Frequent handwashing will decrease the risk.  Most viral illnesses go away within 7 to 10 days with rest and simple home remedies. Sometimes the illness may last for several weeks. Antibiotics will not kill a virus, and they are generally not prescribed for this condition.  Home care    If symptoms are severe, rest at home for the first 2 to 3 days. When you resume activity, don't let yourself get too tired.    Stay away from cigarette smoke.    You may use acetaminophen or ibuprofen to control pain and fever, unless another medicine was prescribed. If you have chronic liver or kidney disease, have ever had a stomach ulcer or gastrointestinal bleeding, or are taking blood-thinning medicines, talk with your healthcare provider before using these medicines. Aspirin should never be given to anyone under 18 years of age who is ill with a viral infection or fever. It may cause severe liver or brain damage.    Your appetite may be poor, so a light diet is fine.  Avoid dehydration by drinking 6 to 8 glasses of fluids per day (water, soft drinks, juices, tea, or soup). Extra fluids will help loosen secretions in the nose and lungs.    Over-the-counter cold medicines will not shorten the length of time youre sick, but they may be helpful for the following symptoms: cough, sore throat, and nasal and sinus congestion. Don't use decongestants if you have high blood pressure.  Follow-up care  Follow up with your healthcare provider, or as advised.  When to seek medical advice  Call your healthcare provider right away if any of these occur:    Cough with lots of colored sputum (mucus)    Severe headache; face, neck, or ear pain    Difficulty swallowing due to throat pain    Fever of 100.4 F (38 C) or higher, or as directed by your healthcare provider  Call 911  Call 911 if any of these occur:    Chest pain, shortness of breath, worsening wheezing, or difficulty breathing    Coughing up blood    Can't swallow because of throat pain  Date Last Reviewed: 9/13/2015 2000-2017 [employer]. [address] [place] [address], [place] [address]. All rights reserved. This information is not intended as a substitute for professional medical care. Always follow your healthcare professional's instructions.

## 2021-06-17 NOTE — PATIENT INSTRUCTIONS - HE
Patient Instructions by Michelle Hogue MD at 3/25/2019  7:30 AM     Author: Michelle Hogue MD Service: -- Author Type: Physician    Filed: 3/25/2019  7:56 AM Encounter Date: 3/25/2019 Status: Signed    : Michelle Hogue MD (Physician)         Patient Education     Exercise for a Healthier Heart  You may wonder how you can improve the health of your heart. If youre thinking about exercise, youre on the right track. You dont need to become an athlete, but you do need a certain amount of brisk exercise to help strengthen your heart. If you have been diagnosed with a heart condition, your doctor may recommend exercise to help stabilize your condition. To help make exercise a habit, choose safe, fun activities.       Be sure to check with your health care provider before starting an exercise program.    Why exercise?  Exercising regularly offers many healthy rewards. It can help you do all of the following:    Improve your blood cholesterol levels to help prevent further heart trouble    Lower your blood pressure to help prevent a stroke or heart attack    Control diabetes, or reduce your risk of getting this disease    Improve your heart and lung function    Reach and maintain a healthy weight    Make your muscles stronger and more limber so you can stay active    Prevent falls and fractures by slowing the loss of bone mass (osteoporosis)    Manage stress better  Exercise tips  Ease into your routine. Set small goals. Then build on them.  Exercise on most days. Aim for a total of 150 or more minutes of moderate to  vigorous intensity activity each week. Consider 40 minutes, 3 to 4 times a week. For best results, activity should last for 40 minutes on average. It is OK to work up to the 40 minute period over time. Examples of moderate-intensity activity is walking one mile in 15 minutes or 30 to 45 minutes of yard work.  Step up your daily activity level. Along with your  exercise program, try being more active throughout the day. Walk instead of drive. Do more household tasks or yard work.  Choose one or more activities you enjoy. Walking is one of the easiest things you can do. You can also try swimming, riding a bike, or taking an exercise class.  Stop exercising and call your doctor if you:    Have chest pain or feel dizzy or lightheaded    Feel burning, tightness, pressure, or heaviness in your chest, neck, shoulders, back, or arms    Have unusual shortness of breath    Have increased joint or muscle pain    Have palpitations or an irregular heartbeat      9520-0573 [employer]. [address] [place] [address]. All rights reserved. This information is not intended as a substitute for professional medical care. Always follow your healthcare professional's instructions.         Patient Education   Understanding Advance Care Planning  Advance care planning is the process of deciding ones own future medical care. It helps ensure that if you cant speak for yourself, your wishes can still be carried out. The plan is a series of legal documents that note a persons wishes. The documents vary by state. Advance care planning may be done when a person has a serious illness that is expected to get worse. It may be done before major surgery. And it can help you and your family be prepared in case of a major illness or injury. Advance care planning helps with making decisions at these times.       A health care proxy is a person who acts as the voice of a patient when the patient cant speak for himself or herself. The name of this role varies by state. It may be called a Durable Medical Power of  or Durable Power of  for Healthcare. It may be called an agent, surrogate, or advocate. Or it may be called a representative or decision maker. It is an official duty that is identified by a legal document. The document also varies by state.    Why Is Advance  Care Planning Important?  If a person communicates their healthcare wishes:    They will be given medical care that matches their values and goals.    Their family members will not be forced to make decisions in a crisis with no guidance.  Creating a Plan  Making an advance care plan is often done in 3 steps:    Thinking about ones wishes. To create an advance care plan, you should think about what kind of medical treatment you would want if you lose the ability to communicate. Are there any situations in which you would refuse or stop treatment? Are there therapies you would want or not want? And whom do you want to make decisions for you? There are many places to learn more about how to plan for your care. Ask your doctor or  for resources.    Picking a health care proxy. This means choosing a trusted person to speak for you only when you cant speak for yourself. When you cannot make medical decisions, your proxy makes sure the instructions in your advance care plan are followed. A proxy does not make decisions based on his or her own opinions. They must put aside those opinions and values if needed, and carry out your wishes.    Filling out the legal documents. There are several kinds of legal documents for advance care planning. Each one tells health care providers your wishes. The documents may vary by state. They must be signed and may need to be witnessed or notarized. You can cancel or change them whenever you wish. Depending on your state, the documents may include a Healthcare Proxy form, Living Will, Durable Medical Power of , Advance Directive, or others.  The Familys Role  The best help a family can give is to support their loved ones wishes. Open and honest communication is vital. Family should express any concerns they have about the patients choices while the patient can still make decisions.    8728-4686 [employer]. [address] [place] [address], [place] [address]. All  rights reserved. This information is not intended as a substitute for professional medical care. Always follow your healthcare professional's instructions.         Also, [facility] offers a free, downloadable health care directive that allows you to share your treatment choices and personal preferences if you cannot communicate your wishes. It also allows you to appoint another person (called a health care agent) to make health care decisions if you are unable to do so. You can download an advance directive by going here: [email or web address]     Patient Education   Personalized Prevention Plan  You are due for the preventive services outlined below.  Your care team is available to assist you in scheduling these services.  If you have already completed any of these items, please share that information with your care team to update in your medical record.  Health Maintenance   Topic Date Due   ? ZOSTER VACCINES (2 of 3) 04/17/2017   ? DXA SCAN  05/27/2017   ? PNEUMOCOCCAL POLYSACCHARIDE VACCINE AGE 65 AND OVER  05/27/2017   ? INFLUENZA VACCINE RULE BASED (1) 08/01/2018   ? FALL RISK ASSESSMENT  03/21/2019   ? MAMMOGRAM  03/21/2020   ? ADVANCE DIRECTIVES DISCUSSED WITH PATIENT  03/21/2023   ? COLONOSCOPY  07/31/2027   ? TD 18+ HE  03/21/2028   ? PNEUMOCOCCAL CONJUGATE VACCINE FOR ADULTS (PCV13 OR PREVNAR)  Completed

## 2021-06-18 NOTE — PATIENT INSTRUCTIONS - HE
Patient Instructions by Michelle Hogue MD at 7/15/2020  8:20 AM     Author: Michelle Hogue MD Service: -- Author Type: Physician    Filed: 7/15/2020  8:56 AM Encounter Date: 7/15/2020 Status: Signed    : Michelle Hogue MD (Physician)         Patient Education     Exercise for a Healthier Heart  You may wonder how you can improve the health of your heart. If youre thinking about exercise, youre on the right track. You dont need to become an athlete, but you do need a certain amount of brisk exercise to help strengthen your heart. If you have been diagnosed with a heart condition, your doctor may recommend exercise to help stabilize your condition. To help make exercise a habit, choose safe, fun activities.       Be sure to check with your health care provider before starting an exercise program.    Why exercise?  Exercising regularly offers many healthy rewards. It can help you do all of the following:    Improve your blood cholesterol levels to help prevent further heart trouble    Lower your blood pressure to help prevent a stroke or heart attack    Control diabetes, or reduce your risk of getting this disease    Improve your heart and lung function    Reach and maintain a healthy weight    Make your muscles stronger and more limber so you can stay active    Prevent falls and fractures by slowing the loss of bone mass (osteoporosis)    Manage stress better  Exercise tips  Ease into your routine. Set small goals. Then build on them.  Exercise on most days. Aim for a total of 150 or more minutes of moderate to  vigorous intensity activity each week. Consider 40 minutes, 3 to 4 times a week. For best results, activity should last for 40 minutes on average. It is OK to work up to the 40 minute period over time. Examples of moderate-intensity activity is walking one mile in 15 minutes or 30 to 45 minutes of yard work.  Step up your daily activity level. Along with your  exercise program, try being more active throughout the day. Walk instead of drive. Do more household tasks or yard work.  Choose one or more activities you enjoy. Walking is one of the easiest things you can do. You can also try swimming, riding a bike, or taking an exercise class.  Stop exercising and call your doctor if you:    Have chest pain or feel dizzy or lightheaded    Feel burning, tightness, pressure, or heaviness in your chest, neck, shoulders, back, or arms    Have unusual shortness of breath    Have increased joint or muscle pain    Have palpitations or an irregular heartbeat      8522-9282 [employer]. [address] [place] [id number]. All rights reserved. This information is not intended as a substitute for professional medical care. Always follow your healthcare professional's instructions.         Patient Education   Alcohol Use   Many people can enjoy a glass of wine or beer without any negative consequences to their health. According to the Centers for Disease Control and Prevention (CDC), having one or fewer drinks per day for women and two or fewer per day for men is considered moderate drinking.     When people drink more than moderately, it can become concerning. Excessive drinking is defined as consuming 15 drinks or more per week for men and 8 drinks or more per week for women. There are various health problems associated with excessive drinking, which include:    Damage to vital organs like the heart, brain, liver and pancreas    Harm to the digestive tract    Weaken the immune system    Higher risk for heart disease and cancer       Patient Education   Urinary Incontinence, Female (Adult)  Urinary incontinence means loss of control of the bladder. This problem affects many women, especially as they get older. If you have incontinence, you may be embarrassed to ask for help. But know that this problem can be treated.  Types of Incontinence  There are different types  of incontinence. Two of the main types are described here. You can have more than one type.    Stress incontinence. With this type, urine leaks when pressure (stress) is put on the bladder. This may happen when you cough, sneeze, or laugh. Stress incontinence most often occurs because the pelvic floor muscles that support the bladder and urethra are weak. This can happen after pregnancy and vaginal childbirth or a hysterectomy. It can also be due to excess body weight or hormone changes.    Urge incontinence (also called overactive bladder). With this type, a sudden urge to urinate is felt often. This may happen even though there may not be much urine in the bladder. The need to urinate often during the night is common. Urge incontinence most often occurs because of bladder spasms. This may be due to bladder irritation or infection. Damage to bladder nerves or pelvic muscles, constipation, and certain medicines can also lead to urge incontinence.  Treatment of urinary incontinence depends on the cause. Further evaluation is needed to find the type you have. This will likely include an exam and certain tests. Based on the results, you and your healthcare provider can then plan treatment. Until a diagnosis is made, the home care tips below can help relieve symptoms.  Home care    Do pelvic floor muscle exercises, if they are prescribed. The pelvic floor muscles help support the bladder and urethra. Many women find that their symptoms improve when doing special exercises that strengthen these muscles. To do the exercises contract the muscles you would use to stop your stream of urine, but do this when youre not urinating. Hold for 10 seconds, then relax. Repeat 10 to 20 times in a row, at least 3 times a day. Your provider may give you other instructions for how to do the exercises and how often.    Keep a bladder diary. This helps track how often and how much you urinate over a set period of time. Bring this diary  with you to your next visit with the provider. The information can help your provider learn more about your bladder problem.    Lose weight, if advised to by your provider. Excess weight puts pressure on the bladder. Your provider can help you create a weight-loss plan thats right for you. This may include exercising more and making certain diet changes.    Don't consume foods and drinks that may irritate the bladder. These can include alcohol and caffeinated drinks.    Quit smoking. Smoking and other tobacco use can lead to chronic cough that strains the pelvic floor muscles. Smoking may also damage the bladder and urethra. Talk with your provider about treatments or methods you can use to quit smoking.    If drinking large amounts of fluid causes you to have symptoms, you may be advised to limit your fluid intake. You may also be advised to drink most of your fluids during the day and to limit fluids at night.    If youre worried about urine leakage or accidents, you may wear absorbent pads to catch urine. Change the pads often. This helps reduce discomfort. It may also reduce the risk of skin or bladder infections.  Follow-up care  Follow up with your healthcare provider, or as directed. It may take some to find the right treatment for your problem. Your treatment plan may include special therapies or medicines. Certain procedures or surgery may also be options. Be sure to discuss any questions you have with your provider.  When to seek medical advice  Call the healthcare provider right away if any of these occur:    Fever of 100.4 F (38 C) or higher, or as directed by your provider    Bladder pain or fullness    Abdominal swelling    Nausea or vomiting    Back pain    Weakness, dizziness or fainting  Date Last Reviewed: 10/1/2017    [id number] [employer]. [address] [place] [address], [place] [address]. All rights reserved. This information is not intended as a substitute for professional medical care.  Always follow your healthcare professional's instructions.     Patient Education   Understanding Advance Care Planning  Advance care planning is the process of deciding ones own future medical care. It helps ensure that if you cant speak for yourself, your wishes can still be carried out. The plan is a series of legal documents that note a persons wishes. The documents vary by state. Advance care planning may be done when a person has a serious illness that is expected to get worse. It may be done before major surgery. And it can help you and your family be prepared in case of a major illness or injury. Advance care planning helps with making decisions at these times.       A health care proxy is a person who acts as the voice of a patient when the patient cant speak for himself or herself. The name of this role varies by state. It may be called a Durable Medical Power of  or Durable Power of  for Healthcare. It may be called an agent, surrogate, or advocate. Or it may be called a representative or decision maker. It is an official duty that is identified by a legal document. The document also varies by state.    Why Is Advance Care Planning Important?  If a person communicates their healthcare wishes:    They will be given medical care that matches their values and goals.    Their family members will not be forced to make decisions in a crisis with no guidance.  Creating a Plan  Making an advance care plan is often done in 3 steps:    Thinking about ones wishes. To create an advance care plan, you should think about what kind of medical treatment you would want if you lose the ability to communicate. Are there any situations in which you would refuse or stop treatment? Are there therapies you would want or not want? And whom do you want to make decisions for you? There are many places to learn more about how to plan for your care. Ask your doctor or  for resources.    Picking a health  care proxy. This means choosing a trusted person to speak for you only when you cant speak for yourself. When you cannot make medical decisions, your proxy makes sure the instructions in your advance care plan are followed. A proxy does not make decisions based on his or her own opinions. They must put aside those opinions and values if needed, and carry out your wishes.    Filling out the legal documents. There are several kinds of legal documents for advance care planning. Each one tells health care providers your wishes. The documents may vary by state. They must be signed and may need to be witnessed or notarized. You can cancel or change them whenever you wish. Depending on your state, the documents may include a Healthcare Proxy form, Living Will, Durable Medical Power of , Advance Directive, or others.  The Familys Role  The best help a family can give is to support their loved ones wishes. Open and honest communication is vital. Family should express any concerns they have about the patients choices while the patient can still make decisions.    [phone] [employer]. [address] [place] [address]. All rights reserved. This information is not intended as a substitute for professional medical care. Always follow your healthcare professional's instructions.         Also, [facility] offers a free, downloadable health care directive that allows you to share your treatment choices and personal preferences if you cannot communicate your wishes. It also allows you to appoint another person (called a health care agent) to make health care decisions if you are unable to do so. You can download an advance directive by going here: [email or web address]     Patient Education   Personalized Prevention Plan  You are due for the preventive services outlined below.  Your care team is available to assist you in scheduling these services.  If you have  already completed any of these items, please share that information with your care team to update in your medical record.  Health Maintenance   Topic Date Due   ? HEPATITIS C SCREENING  1950   ? DXA SCAN  05/27/2017   ? MEDICARE ANNUAL WELLNESS VISIT  03/25/2020   ? FALL RISK ASSESSMENT  03/25/2020   ? INFLUENZA VACCINE RULE BASED (1) 08/01/2020   ? MAMMOGRAM  03/25/2021   ? LIPID  03/25/2024   ? ADVANCE CARE PLANNING  03/25/2024   ? COLORECTAL CANCER SCREENING  07/31/2027   ? TD 18+ HE  03/21/2028   ? PNEUMOCOCCAL IMMUNIZATION 65+ LOW/MEDIUM RISK  Completed   ? ZOSTER VACCINES  Completed   ? HEPATITIS B VACCINES  Aged Out

## 2021-07-20 DIAGNOSIS — I10 ESSENTIAL HYPERTENSION: Primary | ICD-10-CM

## 2021-07-20 RX ORDER — LISINOPRIL/HYDROCHLOROTHIAZIDE 10-12.5 MG
1 TABLET ORAL DAILY
Qty: 90 TABLET | Refills: 1 | Status: SHIPPED | OUTPATIENT
Start: 2021-07-20 | End: 2022-01-18

## 2021-07-20 NOTE — TELEPHONE ENCOUNTER
Reason for Call:  Medication or medication refill:    Do you use a [facility]?  Name of the pharmacy and phone number for the current request:  [facility] [place]    Name of the medication requested: lisinoprol    Other request: vidhya has 1 pill left, she has an appt with dr munguia Friday 7/23.. can we please get an early refill?    Can we leave a detailed message on this number? YES    Phone number patient can be reached at: Home number on file [phone] (home)    Best Time: asap please    Call taken on 7/20/2021 at 10:22 AM by Leana Berger

## 2021-07-23 ENCOUNTER — OFFICE VISIT (OUTPATIENT)
Dept: FAMILY MEDICINE | Facility: CLINIC | Age: 71
End: 2021-07-23
Payer: COMMERCIAL

## 2021-07-23 ENCOUNTER — ANCILLARY PROCEDURE (OUTPATIENT)
Dept: MAMMOGRAPHY | Facility: CLINIC | Age: 71
End: 2021-07-23
Attending: FAMILY MEDICINE
Payer: COMMERCIAL

## 2021-07-23 ENCOUNTER — TELEPHONE (OUTPATIENT)
Dept: FAMILY MEDICINE | Facility: CLINIC | Age: 71
End: 2021-07-23

## 2021-07-23 VITALS
BODY MASS INDEX: 23.82 KG/M2 | HEART RATE: 56 BPM | SYSTOLIC BLOOD PRESSURE: 118 MMHG | HEIGHT: 64 IN | WEIGHT: 139.5 LBS | TEMPERATURE: 97.6 F | DIASTOLIC BLOOD PRESSURE: 73 MMHG | RESPIRATION RATE: 12 BRPM

## 2021-07-23 DIAGNOSIS — Z12.31 VISIT FOR SCREENING MAMMOGRAM: ICD-10-CM

## 2021-07-23 DIAGNOSIS — F10.20 ALCOHOL DEPENDENCE, DAILY USE (H): ICD-10-CM

## 2021-07-23 DIAGNOSIS — E78.2 MIXED HYPERLIPIDEMIA: ICD-10-CM

## 2021-07-23 DIAGNOSIS — Z78.0 MENOPAUSE: ICD-10-CM

## 2021-07-23 DIAGNOSIS — L23.9 ALLERGIC CONTACT DERMATITIS, UNSPECIFIED TRIGGER: Primary | ICD-10-CM

## 2021-07-23 DIAGNOSIS — Z00.00 ADULT GENERAL MEDICAL EXAM: Primary | ICD-10-CM

## 2021-07-23 DIAGNOSIS — R40.4 SPELL OF ALTERED CONSCIOUSNESS: ICD-10-CM

## 2021-07-23 DIAGNOSIS — L23.7 CONTACT DERMATITIS DUE TO POISON IVY: ICD-10-CM

## 2021-07-23 DIAGNOSIS — E55.9 VITAMIN D DEFICIENCY: ICD-10-CM

## 2021-07-23 DIAGNOSIS — K21.9 GASTROESOPHAGEAL REFLUX DISEASE WITHOUT ESOPHAGITIS: ICD-10-CM

## 2021-07-23 DIAGNOSIS — I10 ESSENTIAL HYPERTENSION: ICD-10-CM

## 2021-07-23 LAB
ALBUMIN SERPL-MCNC: 4.4 G/DL (ref 3.5–5)
ALP SERPL-CCNC: 62 U/L (ref 45–120)
ALT SERPL W P-5'-P-CCNC: 22 U/L (ref 0–45)
ANION GAP SERPL CALCULATED.3IONS-SCNC: 14 MMOL/L (ref 5–18)
AST SERPL W P-5'-P-CCNC: 28 U/L (ref 0–40)
BILIRUB SERPL-MCNC: 0.7 MG/DL (ref 0–1)
BUN SERPL-MCNC: 23 MG/DL (ref 8–22)
CALCIUM SERPL-MCNC: 10.3 MG/DL (ref 8.5–10.5)
CHLORIDE BLD-SCNC: 105 MMOL/L (ref 98–107)
CHOLEST SERPL-MCNC: 193 MG/DL
CO2 SERPL-SCNC: 24 MMOL/L (ref 22–31)
CREAT SERPL-MCNC: 0.77 MG/DL (ref 0.6–1.1)
FASTING STATUS PATIENT QL REPORTED: NORMAL
GFR SERPL CREATININE-BSD FRML MDRD: 79 ML/MIN/1.73M2
GLUCOSE BLD-MCNC: 88 MG/DL (ref 70–125)
HDLC SERPL-MCNC: 72 MG/DL
LDLC SERPL CALC-MCNC: 111 MG/DL
POTASSIUM BLD-SCNC: 4.4 MMOL/L (ref 3.5–5)
PROT SERPL-MCNC: 7.4 G/DL (ref 6–8)
SODIUM SERPL-SCNC: 143 MMOL/L (ref 136–145)
TRIGL SERPL-MCNC: 52 MG/DL
TSH SERPL DL<=0.005 MIU/L-ACNC: 2.58 UIU/ML (ref 0.3–5)
VIT B12 SERPL-MCNC: 540 PG/ML (ref 213–816)

## 2021-07-23 PROCEDURE — 36415 COLL VENOUS BLD VENIPUNCTURE: CPT | Performed by: FAMILY MEDICINE

## 2021-07-23 PROCEDURE — 80053 COMPREHEN METABOLIC PANEL: CPT | Performed by: FAMILY MEDICINE

## 2021-07-23 PROCEDURE — 84443 ASSAY THYROID STIM HORMONE: CPT | Performed by: FAMILY MEDICINE

## 2021-07-23 PROCEDURE — 77067 SCR MAMMO BI INCL CAD: CPT | Mod: TC | Performed by: RADIOLOGY

## 2021-07-23 PROCEDURE — 80061 LIPID PANEL: CPT | Performed by: FAMILY MEDICINE

## 2021-07-23 PROCEDURE — 82607 VITAMIN B-12: CPT | Performed by: FAMILY MEDICINE

## 2021-07-23 PROCEDURE — 99397 PER PM REEVAL EST PAT 65+ YR: CPT | Performed by: FAMILY MEDICINE

## 2021-07-23 PROCEDURE — 82306 VITAMIN D 25 HYDROXY: CPT | Performed by: FAMILY MEDICINE

## 2021-07-23 RX ORDER — DESOXIMETASONE 2.5 MG/G
CREAM TOPICAL 2 TIMES DAILY
Qty: 60 G | Refills: 1 | Status: SHIPPED | OUTPATIENT
Start: 2021-07-23 | End: 2021-07-27

## 2021-07-23 ASSESSMENT — ENCOUNTER SYMPTOMS
SHORTNESS OF BREATH: 0
SORE THROAT: 0
CONSTIPATION: 0
PALPITATIONS: 0
HEADACHES: 0
JOINT SWELLING: 0
FREQUENCY: 0
WEAKNESS: 0
FEVER: 0
DYSURIA: 0
PARESTHESIAS: 0
EYE PAIN: 0
HEMATURIA: 0
COUGH: 0
MYALGIAS: 0
DIARRHEA: 0
NAUSEA: 0
BREAST MASS: 0
ABDOMINAL PAIN: 0
DIZZINESS: 0
HEARTBURN: 0
ARTHRALGIAS: 0
HEMATOCHEZIA: 0
CHILLS: 0
NERVOUS/ANXIOUS: 0

## 2021-07-23 ASSESSMENT — MIFFLIN-ST. JEOR: SCORE: 1145.52

## 2021-07-23 ASSESSMENT — ACTIVITIES OF DAILY LIVING (ADL): CURRENT_FUNCTION: NO ASSISTANCE NEEDED

## 2021-07-23 NOTE — TELEPHONE ENCOUNTER
PRIOR AUTHORIZATION DENIED - COMPLETED VIA EPA     Medication: desoximetasone (TOPICORT) 0.25 % external cream - DENIED     Denial Date:  07/23/2021    Denial Rational: You must first try and fail at least TWO preferred alternative medications:      Triamcinolone acetonide 0.025% cream/lotion/ointment,    Triamcinolone acetonide 0.1% cream/lotion/ointment,    Triamcinolone acetonide 0.5% cream/ointment,     Hydrocortisone 2.5%,    Alclometasone,    Betamethasone dipropionate,    Clobetasol propionate cream/gel/ointment/solution,     Desonide 0.05% cream/ointment,    Fluocinolone,     Fluocinonide 0.05%,     Fluticasone 0.05% cream, and Fluticasone 0.005% ointment.    Appeal Information: Will document once denial letter is received from the insurance     1st attempt 07/26/2021 - called insurance; waiting for them to fax us the denial letter

## 2021-07-23 NOTE — PROGRESS NOTES
"ASSESSMENT / PLAN:   1. 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   Reviewed  orders  - REVIEW OF HEALTH MAINTENANCE PROTOCOL ORDERS    2. Contact dermatitis due to poison ivy  Patient has what appears to be a contact dermatitis - not necessarily poison ivy - may be from shoes.  There are multiple papules - now excoriated on both feet (to ankles).  Discussed using her tennis shoes for her morning walk, but then taking shoes/socks off and letting feet \"breathe\".  Will increase potency on her topical steroid cream.  If not improving, consider Derm evaluation.   - desoximetasone (TOPICORT) 0.25 % external cream; Apply topically 2 times daily No longer than 2 weeks.  Dispense: 60 g; Refill: 1    3. Vitamin D deficiency  Vitamin D supplementation was started at time of neurologic event last year.  Will check levels - adjust supplements as needed.   - Vitamin D Deficiency; Future  - Vitamin D Deficiency    4. Spell of altered consciousness  This occurred last year - patient notes \"I'd rather it not happen again - it cost me a lot of money!\"  No further symptoms - was started on Vitamin B12 at the time by neurologist.  Has continued with OTC Vitamin B12 supplements.  Will recheck Vitamin B12 and TSH.    - Vitamin B12; Future  - TSH; Future  - Vitamin B12  - TSH    5. Mixed hyperlipidemia  On Fenofibrate - tolerating well - check labs.    - Lipid Profile (Chol, Trig, HDL, LDL calc); Future  - Lipid Profile (Chol, Trig, HDL, LDL calc)    6. Gastroesophageal reflux disease without esophagitis  Takes Omeprazole sparingly - symptoms controlled.     7. Alcohol dependence, daily use (H)  Patient drinks wine daily - discussed - not ready to change.     8. Essential hypertension  Blood pressure is controlled on current regimen of Lisinopril/HCTZ.  Continue this dosing.    - Comprehensive metabolic panel (BMP + Alb, Alk Phos, ALT, AST, " "Total. Bili, TP); Future  - Comprehensive metabolic panel (BMP + Alb, Alk Phos, ALT, AST, Total. Bili, TP)    9. Menopause  Has not had previous bone density scanning - recommended  - DEXA HIP/PELVIS/SPINE - Future; Future    10. Visit for screening mammogram  Due for breast cancer screening - ordered mammogram  - MA SCREENING DIGITAL BILAT - Future  (s+30); Future    SUBJECTIVE:   Geneva Bond is a 71 year old female who presents for Preventive Visit.      Patient has been advised of split billing requirements and indicates understanding: Yes   Are you in the first 12 months of your Medicare coverage?  No    Having a break out of rash on her feet - bilateral - wakes her up at night - itching -   Thinks it's the new tennis shoes  Started around the 4th of July -   Using hydrocortisone cream - anti-itch cream   Getting slightly worse -   No new products - no new detergents  Walks every morning - gets really sweaty - leaves her shoes on for a long time after coming back from walk - doesn't change footwear during day if not going anywhere else      Healthy Habits:     In general, how would you rate your overall health?  Good    Frequency of exercise:  4-5 days/week    Duration of exercise:  30-45 minutes    Do you usually eat at least 4 servings of fruit and vegetables a day, include whole grains    & fiber and avoid regularly eating high fat or \"junk\" foods?  Yes    Taking medications regularly:  Yes    Medication side effects:  Not applicable    Ability to successfully perform activities of daily living:  No assistance needed    Home Safety:  Lack of grab bars in the bathroom    Hearing Impairment:  Difficulty following a conversation in a noisy restaurant or crowded room, feel that people are mumbling or not speaking clearly, need to ask people to speak up or repeat themselves and difficulty understanding soft or whispered speech    In the past 6 months, have you been bothered by leaking of urine? Yes    In " general, how would you rate your overall mental or emotional health?  Good      PHQ-2 Total Score: 0    Additional concerns today:  Yes    Do you feel safe in your environment? Yes    Have you ever done Advance Care Planning? (For example, a Health Directive, POLST, or a discussion with a medical provider or your loved ones about your wishes): No, advance care planning information given to patient to review.  Advanced care planning was discussed at today's visit.    Fall risk  Fallen 2 or more times in the past year?: No  Any fall with injury in the past year?: No    Cognitive Screening   1) Repeat 3 items (Leader, Season, Table)    2) Clock draw: NORMAL  3) 3 item recall: Recalls 3 objects  Results: NORMAL clock, 1-2 items recalled: COGNITIVE IMPAIRMENT LESS LIKELY    Mini-CogTM Copyright S Ruthann. Licensed by the author for use in [facility]; reprinted with permission ([email or web address]). All rights reserved.      Do you have sleep apnea, excessive snoring or daytime drowsiness?: no    Reviewed and updated as needed this visit by clinical staff  Tobacco  Allergies  Meds   Med Hx  Surg Hx  Fam Hx  Soc Hx        Reviewed and updated as needed this visit by Provider  Tobacco  Allergies  Meds   Med Hx  Surg Hx  Fam Hx  Soc Hx       Social History     Tobacco Use     Smoking status: Former Smoker     Years: 17.00     Smokeless tobacco: Never Used   Substance Use Topics     Alcohol use: Yes     Alcohol/week: 2.0 standard drinks     Types: 2 Glasses of wine per week     If you drink alcohol do you typically have >3 drinks per day or >7 drinks per week? Yes      Alcohol Use 7/23/2021   Prescreen: >3 drinks/day or >7 drinks/week? Yes   Prescreen: >3 drinks/day or >7 drinks/week? -   AUDIT SCORE  6         Current providers sharing in care for this patient include:   Patient Care Team:  Michelle Hogue MD as PCP - General (Family Medicine)  Melody Oneal MD as MD (Neurology)  Kimmy  Melody Freire MD as Assigned Neuroscience Provider  Michelle Hogue MD as Assigned PCP    The following health maintenance items are reviewed in Epic and correct as of today:  Health Maintenance Due   Topic Date Due     DEXA  Never done     MAMMO SCREENING  03/25/2021     Lab work is in process  Labs reviewed in EPIC  BP Readings from Last 3 Encounters:   07/23/21 118/73   12/14/20 126/80   11/17/20 (!) 140/75    Wt Readings from Last 3 Encounters:   07/23/21 63.3 kg (139 lb 8 oz)   12/14/20 65.8 kg (145 lb)   11/17/20 65.8 kg (145 lb)                  Patient Active Problem List   Diagnosis     Pain of right upper extremity     HLD (hyperlipidemia)     Gastroesophageal reflux disease without esophagitis     Essential hypertension     Diverticulosis of large intestine     Alcohol dependence, daily use (H)     Past Surgical History:   Procedure Laterality Date     LAPAROSCOPIC CHOLECYSTECTOMY  2013       Social History     Tobacco Use     Smoking status: Former Smoker     Years: 17.00     Smokeless tobacco: Never Used   Substance Use Topics     Alcohol use: Yes     Alcohol/week: 2.0 standard drinks     Types: 2 Glasses of wine per week     Family History   Problem Relation Age of Onset     Cerebrovascular Disease Mother      Cerebrovascular Disease Father      Hypertension Mother      Diabetes Type 2  Mother      Breast Cancer Maternal Aunt 67.00     Uterine Cancer Maternal Aunt      Uterine Cancer Maternal Grandmother          Current Outpatient Medications   Medication Sig Dispense Refill     aspirin (ASA) 325 MG tablet Take 325 mg by mouth daily       Biotin 800 MCG TABS Take 1 tablet by mouth       cholecalciferol 25 MCG (1000 UT) TABS Take 1,000 Units by mouth       cyanocobalamin (VITAMIN B-12) 1000 MCG tablet Take by mouth daily       desoximetasone (TOPICORT) 0.25 % external cream Apply topically 2 times daily No longer than 2 weeks. 60 g 1     fenofibrate (TRICOR) 145 MG tablet Take 145 mg  "by mouth daily       lisinopril-hydrochlorothiazide (ZESTORETIC) 10-12.5 MG tablet Take 1 tablet by mouth daily 90 tablet 1     omeprazole (PRILOSEC) 20 MG DR capsule Take 20 mg by mouth       Thiamine HCl (B-1) 500 MG TABS        Allergies   Allergen Reactions     Ibuprofen Hives     \"I have taken Advil twice and both times noticed a rash on my neck.  I have not taken it in many years now.\"     Atorvastatin Muscle Pain (Myalgia)     Couldn't use right arm     Pneumonia Vaccine:Adults age 65+ who received Pneumovax (PPSV23) at 65 years or older: Should be given PCV13 > 1 year after their most recent PPSV23  - patient has had both pneumonia shots  Mammogram Screening: Mammogram Screening: Recommended mammography every 1-2 years with patient discussion and risk factor consideration    FHS-7:   Breast CA Risk Assessment (FHS-7) 7/23/2021   Did any of your first-degree relatives have breast or ovarian cancer? No   Did any of your relatives have bilateral breast cancer? No   Did any man in your family have breast cancer? No   Did any woman in your family have breast and ovarian cancer? No   Did any woman in your family have breast cancer before age 50 y? Unknown   Do you have 2 or more relatives with breast and/or ovarian cancer? No   Do you have 2 or more relatives with breast and/or bowel cancer? Yes       Mammogram Screening: Recommended mammography every 1-2 years with patient discussion and risk factor consideration  Pertinent mammograms are reviewed under the imaging tab.    Review of Systems   Constitutional: Negative for chills and fever.   HENT: Negative for congestion, ear pain, hearing loss and sore throat.    Eyes: Negative for pain and visual disturbance.   Respiratory: Negative for cough and shortness of breath.    Cardiovascular: Negative for chest pain, palpitations and peripheral edema.   Gastrointestinal: Negative for abdominal pain, constipation, diarrhea, heartburn, hematochezia and nausea.   Breasts: " " Negative for tenderness, breast mass and discharge.   Genitourinary: Negative for dysuria, frequency, genital sores, hematuria, pelvic pain, urgency, vaginal bleeding and vaginal discharge.   Musculoskeletal: Negative for arthralgias, joint swelling and myalgias.   Skin: Positive for rash (bilateral feet x 3 weeks, itchy).   Neurological: Negative for dizziness, weakness, headaches and paresthesias.   Psychiatric/Behavioral: Negative for mood changes. The patient is not nervous/anxious.          OBJECTIVE:   /73 (BP Location: Left arm, Patient Position: Sitting, Cuff Size: Adult Small)   Pulse 56   Temp 97.6  F (36.4  C) (Temporal)   Resp 12   Ht 1.63 m (5' 4.17\")   Wt 63.3 kg (139 lb 8 oz)   BMI 23.82 kg/m   Estimated body mass index is 23.82 kg/m  as calculated from the following:    Height as of this encounter: 1.63 m (5' 4.17\").    Weight as of this encounter: 63.3 kg (139 lb 8 oz).  Physical Exam  Constitutional:       General: She is not in acute distress.     Appearance: She is well-developed.   HENT:      Right Ear: Tympanic membrane and external ear normal.      Left Ear: Tympanic membrane and external ear normal.      Nose: Nose normal.      Mouth/Throat:      Pharynx: No oropharyngeal exudate.   Eyes:      General:         Right eye: No discharge.         Left eye: No discharge.      Conjunctiva/sclera: Conjunctivae normal.      Pupils: Pupils are equal, round, and reactive to light.   Neck:      Thyroid: No thyromegaly.      Trachea: No tracheal deviation.   Cardiovascular:      Rate and Rhythm: Normal rate and regular rhythm.      Pulses: Normal pulses.      Heart sounds: Normal heart sounds, S1 normal and S2 normal. No murmur heard.   No friction rub. No S3 or S4 sounds.    Pulmonary:      Effort: Pulmonary effort is normal. No respiratory distress.      Breath sounds: Normal breath sounds. No wheezing or rales.   Chest:      Breasts:         Right: No mass, nipple discharge or tenderness. " "        Left: No mass, nipple discharge or tenderness.   Abdominal:      General: Bowel sounds are normal.      Palpations: Abdomen is soft. There is no mass.      Tenderness: There is no abdominal tenderness.   Musculoskeletal:         General: Normal range of motion.      Cervical back: Neck supple.   Lymphadenopathy:      Cervical: No cervical adenopathy.   Skin:     General: Skin is warm and dry.      Findings: No rash (bilateral feet - excoriated papules from tips of toes to ankle - multiple lesions - ).   Neurological:      Mental Status: She is alert and oriented to person, place, and time.      Motor: No abnormal muscle tone.      Deep Tendon Reflexes: Reflexes are normal and symmetric.   Psychiatric:         Thought Content: Thought content normal.         Judgment: Judgment normal.         Diagnostic Test Results:  Labs reviewed in Epic  No results found for this or any previous visit (from the past 24 hour(s)).    Patient has been advised of split billing requirements and indicates understanding: Yes  COUNSELING:  Reviewed preventive health counseling, as reflected in patient instructions       Regular exercise       Healthy diet/nutrition       Vision screening       Hearing screening       Fall risk prevention       Alcohol Use     Estimated body mass index is 23.82 kg/m  as calculated from the following:    Height as of this encounter: 1.63 m (5' 4.17\").    Weight as of this encounter: 63.3 kg (139 lb 8 oz).        She reports that she has quit smoking. She quit after 17.00 years of use. She has never used smokeless tobacco.      Appropriate preventive services were discussed with this patient, including applicable screening as appropriate for cardiovascular disease, diabetes, osteopenia/osteoporosis, and glaucoma.  As appropriate for age/gender, discussed screening for colorectal cancer, prostate cancer, breast cancer, and cervical cancer. Checklist reviewing preventive services available has been " given to the patient.    Reviewed patients plan of care and provided an AVS. The Basic Care Plan (routine screening as documented in Health Maintenance) for [place] meets the Care Plan requirement. This Care Plan has been established and reviewed with the Patient.    Counseling Resources:  ATP IV Guidelines  Pooled Cohorts Equation Calculator  Breast Cancer Risk Calculator  Breast Cancer: Medication to Reduce Risk  FRAX Risk Assessment  ICSI Preventive Guidelines  Dietary Guidelines for Americans, 2010  [employer] MyPlate  ASA Prophylaxis  Lung CA Screening    JEREMIAH CHAUHAN MD  [facility]    Identified Health Risks:

## 2021-07-26 LAB — DEPRECATED CALCIDIOL+CALCIFEROL SERPL-MC: 35 UG/L (ref 30–80)

## 2021-07-27 ENCOUNTER — TRANSFERRED RECORDS (OUTPATIENT)
Dept: HEALTH INFORMATION MANAGEMENT | Facility: CLINIC | Age: 71
End: 2021-07-27

## 2021-07-27 RX ORDER — BETAMETHASONE DIPROPIONATE 0.5 MG/G
CREAM TOPICAL 2 TIMES DAILY
Qty: 45 G | Refills: 3 | Status: SHIPPED | OUTPATIENT
Start: 2021-07-27 | End: 2022-07-22

## 2021-08-02 DIAGNOSIS — E78.2 MIXED HYPERLIPIDEMIA: Primary | ICD-10-CM

## 2021-08-03 RX ORDER — FENOFIBRATE 145 MG/1
TABLET, COATED ORAL
Qty: 90 TABLET | Refills: 3 | Status: SHIPPED | OUTPATIENT
Start: 2021-08-03 | End: 2022-08-06

## 2021-08-04 ENCOUNTER — ANCILLARY PROCEDURE (OUTPATIENT)
Dept: BONE DENSITY | Facility: CLINIC | Age: 71
End: 2021-08-04
Attending: FAMILY MEDICINE
Payer: COMMERCIAL

## 2021-08-04 DIAGNOSIS — Z78.0 MENOPAUSE: ICD-10-CM

## 2021-08-04 PROCEDURE — 77080 DXA BONE DENSITY AXIAL: CPT | Mod: TC | Performed by: PHYSICIAN ASSISTANT

## 2021-10-03 ENCOUNTER — HEALTH MAINTENANCE LETTER (OUTPATIENT)
Age: 71
End: 2021-10-03

## 2021-10-08 ENCOUNTER — IMMUNIZATION (OUTPATIENT)
Dept: FAMILY MEDICINE | Facility: CLINIC | Age: 71
End: 2021-10-08
Payer: COMMERCIAL

## 2021-10-08 PROCEDURE — 90471 IMMUNIZATION ADMIN: CPT

## 2021-10-08 PROCEDURE — 90662 IIV NO PRSV INCREASED AG IM: CPT

## 2022-01-15 DIAGNOSIS — I10 ESSENTIAL HYPERTENSION: ICD-10-CM

## 2022-01-18 RX ORDER — LISINOPRIL/HYDROCHLOROTHIAZIDE 10-12.5 MG
1 TABLET ORAL DAILY
Qty: 90 TABLET | Refills: 2 | OUTPATIENT
Start: 2022-01-18 | End: 2024-08-08

## 2022-01-18 RX ORDER — LISINOPRIL/HYDROCHLOROTHIAZIDE 10-12.5 MG
TABLET ORAL
Qty: 90 TABLET | Refills: 2 | Status: SHIPPED | OUTPATIENT
Start: 2022-01-18 | End: 2022-10-17

## 2022-01-18 NOTE — TELEPHONE ENCOUNTER
"Last Written Prescription Date:  7/20/21  Last Fill Quantity: 90,  # refills: 1   Last office visit provider:  7/23/21     Requested Prescriptions   Pending Prescriptions Disp Refills     lisinopril-hydrochlorothiazide (ZESTORETIC) 10-12.5 MG tablet [Pharmacy Med Name: Lisinopril-hydroCHLOROthiazide 10-12.5 MG Oral Tablet] 90 tablet 0     Sig: Take 1 tablet by mouth once daily       Diuretics (Including Combos) Protocol Passed - 1/15/2022  9:42 AM        Passed - Blood pressure under 140/90 in past 12 months     BP Readings from Last 3 Encounters:   07/23/21 118/73   12/14/20 126/80   11/17/20 (!) 140/75                 Passed - Recent (12 mo) or future (30 days) visit within the authorizing provider's specialty     Patient has had an office visit with the authorizing provider or a provider within the authorizing providers department within the previous 12 mos or has a future within next 30 days. See \"Patient Info\" tab in inbasket, or \"Choose Columns\" in Meds & Orders section of the refill encounter.              Passed - Medication is active on med list        Passed - Patient is age 18 or older        Passed - No active pregancy on record        Passed - Normal serum creatinine on file in past 12 months     Recent Labs   Lab Test 07/23/21  0958   CR 0.77              Passed - Normal serum potassium on file in past 12 months     Recent Labs   Lab Test 07/23/21  0958   POTASSIUM 4.4                    Passed - Normal serum sodium on file in past 12 months     Recent Labs   Lab Test 07/23/21  0958                 Passed - No positive pregnancy test in past 12 months       ACE Inhibitors (Including Combos) Protocol Passed - 1/15/2022  9:42 AM        Passed - Blood pressure under 140/90 in past 12 months     BP Readings from Last 3 Encounters:   07/23/21 118/73   12/14/20 126/80   11/17/20 (!) 140/75                 Passed - Recent (12 mo) or future (30 days) visit within the authorizing provider's specialty     " "Patient has had an office visit with the authorizing provider or a provider within the authorizing providers department within the previous 12 mos or has a future within next 30 days. See \"Patient Info\" tab in inbasket, or \"Choose Columns\" in Meds & Orders section of the refill encounter.              Passed - Medication is active on med list        Passed - Patient is age 18 or older        Passed - No active pregnancy on record        Passed - Normal serum creatinine on file in past 12 months     Recent Labs   Lab Test 07/23/21  0958   CR 0.77       Ok to refill medication if creatinine is low          Passed - Normal serum potassium on file in past 12 months     Recent Labs   Lab Test 07/23/21  0958   POTASSIUM 4.4             Passed - No positive pregnancy test within past 12 months             James Fajardo RN 01/18/22 8:36 AM  "

## 2022-03-29 ENCOUNTER — TRANSFERRED RECORDS (OUTPATIENT)
Dept: HEALTH INFORMATION MANAGEMENT | Facility: CLINIC | Age: 72
End: 2022-03-29

## 2022-03-29 ENCOUNTER — OFFICE VISIT (OUTPATIENT)
Dept: URGENT CARE | Facility: URGENT CARE | Age: 72
End: 2022-03-29
Payer: COMMERCIAL

## 2022-03-29 VITALS
SYSTOLIC BLOOD PRESSURE: 148 MMHG | HEART RATE: 73 BPM | RESPIRATION RATE: 16 BRPM | DIASTOLIC BLOOD PRESSURE: 80 MMHG | BODY MASS INDEX: 23.9 KG/M2 | WEIGHT: 140 LBS | OXYGEN SATURATION: 97 % | TEMPERATURE: 98.6 F

## 2022-03-29 DIAGNOSIS — M17.0 PRIMARY OSTEOARTHRITIS OF BOTH KNEES: Primary | ICD-10-CM

## 2022-03-29 DIAGNOSIS — M79.89 RIGHT LEG SWELLING: ICD-10-CM

## 2022-03-29 PROCEDURE — 85379 FIBRIN DEGRADATION QUANT: CPT | Performed by: INTERNAL MEDICINE

## 2022-03-29 PROCEDURE — 36415 COLL VENOUS BLD VENIPUNCTURE: CPT | Performed by: INTERNAL MEDICINE

## 2022-03-29 PROCEDURE — 99203 OFFICE O/P NEW LOW 30 MIN: CPT | Performed by: INTERNAL MEDICINE

## 2022-03-30 LAB — D DIMER PPP FEU-MCNC: 0.61 UG/ML FEU (ref 0–0.5)

## 2022-03-30 NOTE — PATIENT INSTRUCTIONS
My suspicion is that you have some underlying degenerative arthritis of the knees that has acutely flared.  This can lead to the swelling and stiffness and pain that you are experiencing.     Take regular strength aspirin twice daily with food for the next several days to see if you can knock out this inflammation. Then can reduce to using as needed.    We'll also double-check a D-dimer test to be extra confident that you don't have a blood clot given your overseas trip that's coming up.

## 2022-04-26 ENCOUNTER — IMMUNIZATION (OUTPATIENT)
Dept: NURSING | Facility: CLINIC | Age: 72
End: 2022-04-26
Payer: COMMERCIAL

## 2022-04-26 PROCEDURE — 91305 COVID-19,PF,PFIZER (12+ YRS): CPT

## 2022-04-26 PROCEDURE — 0054A COVID-19,PF,PFIZER (12+ YRS): CPT

## 2022-07-22 RX ORDER — FENOFIBRATE 145 MG/1
1 TABLET, COATED ORAL DAILY
COMMUNITY
Start: 2021-08-03 | End: 2022-07-25

## 2022-07-25 ENCOUNTER — OFFICE VISIT (OUTPATIENT)
Dept: FAMILY MEDICINE | Facility: CLINIC | Age: 72
End: 2022-07-25
Payer: COMMERCIAL

## 2022-07-25 ENCOUNTER — ANCILLARY PROCEDURE (OUTPATIENT)
Dept: MAMMOGRAPHY | Facility: CLINIC | Age: 72
End: 2022-07-25
Attending: FAMILY MEDICINE
Payer: COMMERCIAL

## 2022-07-25 VITALS
TEMPERATURE: 98 F | WEIGHT: 139.1 LBS | SYSTOLIC BLOOD PRESSURE: 100 MMHG | HEART RATE: 59 BPM | BODY MASS INDEX: 23.75 KG/M2 | HEIGHT: 64 IN | DIASTOLIC BLOOD PRESSURE: 60 MMHG | RESPIRATION RATE: 12 BRPM | OXYGEN SATURATION: 99 %

## 2022-07-25 DIAGNOSIS — E55.9 VITAMIN D DEFICIENCY: ICD-10-CM

## 2022-07-25 DIAGNOSIS — K21.9 GASTROESOPHAGEAL REFLUX DISEASE WITHOUT ESOPHAGITIS: ICD-10-CM

## 2022-07-25 DIAGNOSIS — Z12.31 VISIT FOR SCREENING MAMMOGRAM: ICD-10-CM

## 2022-07-25 DIAGNOSIS — F10.20 ALCOHOL DEPENDENCE, DAILY USE (H): ICD-10-CM

## 2022-07-25 DIAGNOSIS — Z00.00 ENCOUNTER FOR MEDICARE ANNUAL WELLNESS EXAM: Primary | ICD-10-CM

## 2022-07-25 DIAGNOSIS — I10 ESSENTIAL HYPERTENSION: ICD-10-CM

## 2022-07-25 DIAGNOSIS — E78.2 MIXED HYPERLIPIDEMIA: ICD-10-CM

## 2022-07-25 LAB
ALBUMIN SERPL BCG-MCNC: 4.6 G/DL (ref 3.5–5.2)
ALP SERPL-CCNC: 56 U/L (ref 35–104)
ALT SERPL W P-5'-P-CCNC: 23 U/L (ref 10–35)
ANION GAP SERPL CALCULATED.3IONS-SCNC: 10 MMOL/L (ref 7–15)
AST SERPL W P-5'-P-CCNC: 32 U/L (ref 10–35)
BILIRUB SERPL-MCNC: 0.6 MG/DL
BUN SERPL-MCNC: 21.6 MG/DL (ref 8–23)
CALCIUM SERPL-MCNC: 10.1 MG/DL (ref 8.8–10.2)
CHLORIDE SERPL-SCNC: 105 MMOL/L (ref 98–107)
CHOLEST SERPL-MCNC: 158 MG/DL
CREAT SERPL-MCNC: 0.77 MG/DL (ref 0.51–0.95)
DEPRECATED CALCIDIOL+CALCIFEROL SERPL-MC: 42 UG/L (ref 20–75)
DEPRECATED HCO3 PLAS-SCNC: 27 MMOL/L (ref 22–29)
ERYTHROCYTE [DISTWIDTH] IN BLOOD BY AUTOMATED COUNT: 13.7 % (ref 10–15)
GFR SERPL CREATININE-BSD FRML MDRD: 83 ML/MIN/1.73M2
GLUCOSE SERPL-MCNC: 94 MG/DL (ref 70–99)
HCT VFR BLD AUTO: 39.1 % (ref 35–47)
HDLC SERPL-MCNC: 63 MG/DL
HGB BLD-MCNC: 12.6 G/DL (ref 11.7–15.7)
LDLC SERPL CALC-MCNC: 86 MG/DL
MCH RBC QN AUTO: 27.3 PG (ref 26.5–33)
MCHC RBC AUTO-ENTMCNC: 32.2 G/DL (ref 31.5–36.5)
MCV RBC AUTO: 85 FL (ref 78–100)
NONHDLC SERPL-MCNC: 95 MG/DL
PLATELET # BLD AUTO: 262 10E3/UL (ref 150–450)
POTASSIUM SERPL-SCNC: 4.5 MMOL/L (ref 3.4–5.3)
PROT SERPL-MCNC: 7.3 G/DL (ref 6.4–8.3)
RBC # BLD AUTO: 4.62 10E6/UL (ref 3.8–5.2)
SODIUM SERPL-SCNC: 142 MMOL/L (ref 136–145)
TRIGL SERPL-MCNC: 45 MG/DL
WBC # BLD AUTO: 4.3 10E3/UL (ref 4–11)

## 2022-07-25 PROCEDURE — 77067 SCR MAMMO BI INCL CAD: CPT | Mod: TC | Performed by: RADIOLOGY

## 2022-07-25 PROCEDURE — 85027 COMPLETE CBC AUTOMATED: CPT | Performed by: FAMILY MEDICINE

## 2022-07-25 PROCEDURE — 80053 COMPREHEN METABOLIC PANEL: CPT | Performed by: FAMILY MEDICINE

## 2022-07-25 PROCEDURE — 99397 PER PM REEVAL EST PAT 65+ YR: CPT | Performed by: FAMILY MEDICINE

## 2022-07-25 PROCEDURE — 36415 COLL VENOUS BLD VENIPUNCTURE: CPT | Performed by: FAMILY MEDICINE

## 2022-07-25 PROCEDURE — 99213 OFFICE O/P EST LOW 20 MIN: CPT | Mod: 25 | Performed by: FAMILY MEDICINE

## 2022-07-25 PROCEDURE — 80061 LIPID PANEL: CPT | Performed by: FAMILY MEDICINE

## 2022-07-25 PROCEDURE — 82306 VITAMIN D 25 HYDROXY: CPT | Performed by: FAMILY MEDICINE

## 2022-07-25 ASSESSMENT — ENCOUNTER SYMPTOMS
DYSURIA: 0
NAUSEA: 0
COUGH: 0
HEMATOCHEZIA: 0
ABDOMINAL PAIN: 0
WEAKNESS: 0
PALPITATIONS: 0
NERVOUS/ANXIOUS: 0
SHORTNESS OF BREATH: 0
FEVER: 0
SORE THROAT: 0
FREQUENCY: 1
PARESTHESIAS: 0
HEADACHES: 0
CONSTIPATION: 0
HEARTBURN: 1
BREAST MASS: 0
DIARRHEA: 0
ARTHRALGIAS: 0
DIZZINESS: 0
HEMATURIA: 0
CHILLS: 0
JOINT SWELLING: 0
EYE PAIN: 0
MYALGIAS: 1

## 2022-07-25 ASSESSMENT — ACTIVITIES OF DAILY LIVING (ADL): CURRENT_FUNCTION: NO ASSISTANCE NEEDED

## 2022-07-25 NOTE — PROGRESS NOTES
"SUBJECTIVE:   Geneva Bond is a 72 year old female who presents for Preventive Visit.      Patient has been advised of split billing requirements and indicates understanding: Yes  Are you in the first 12 months of your Medicare coverage?        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  Good    Frequency of exercise:  4-5 days/week    Duration of exercise:  30-45 minutes    Do you usually eat at least 4 servings of fruit and vegetables a day, include whole grains    & fiber and avoid regularly eating high fat or \"junk\" foods?  Yes    Taking medications regularly:  Yes    Medication side effects:  None    Ability to successfully perform activities of daily living:  No assistance needed    Home Safety:  Lack of grab bars in the bathroom and lack of handrails on stairs    Hearing Impairment:  No hearing concerns    In the past 6 months, have you been bothered by leaking of urine? Yes    In general, how would you rate your overall mental or emotional health?  Good      PHQ-2 Total Score: 0    Additional concerns today:  No    Do you feel safe in your environment? Yes    Have you ever done Advance Care Planning? (For example, a Health Directive, POLST, or a discussion with a medical provider or your loved ones about your wishes): No, advance care planning information given to patient to review.  Patient declined advance care planning discussion at this time.       Fall risk  Fallen 2 or more times in the past year?: No  Any fall with injury in the past year?: No    Cognitive Screening "   1) Repeat 3 items (Leader, Season, Table)    2) Clock draw: NORMAL  3) 3 item recall: Recalls 3 objects  Results: 3 items recalled: COGNITIVE IMPAIRMENT LESS LIKELY    Mini-CogTM Copyright JAN Beavers. Licensed by the author for use in [facility]; reprinted with permission ([email or web address]). All rights reserved.      Do you have sleep apnea, excessive snoring or daytime drowsiness?: yes    Reviewed and updated as needed this visit by clinical staff   Tobacco  Allergies  Meds                Reviewed and updated as needed this visit by Provider                   Social History     Tobacco Use     Smoking status: Former Smoker     Years: 17.00     Smokeless tobacco: Never Used   Substance Use Topics     Alcohol use: Yes     Alcohol/week: 2.0 standard drinks     Types: 2 Glasses of wine per week         Alcohol Use 7/25/2022   Prescreen: >3 drinks/day or >7 drinks/week? No   Prescreen: >3 drinks/day or >7 drinks/week? -   AUDIT SCORE  -       Current providers sharing in care for this patient include:   Patient Care Team:  Michelle Hogue MD as PCP - General (Family Medicine)  Melody Oneal MD as MD (Neurology)  Michelle Hogue MD as Assigned PCP    The following health maintenance items are reviewed in Epic and correct as of today:  Health Maintenance Due   Topic Date Due     LUNG CANCER SCREENING  Never done     BP Readings from Last 3 Encounters:   07/25/22 100/60   03/29/22 (!) 148/80   07/23/21 118/73    Wt Readings from Last 3 Encounters:   07/25/22 63.1 kg (139 lb 1.6 oz)   03/29/22 63.5 kg (140 lb)   07/23/21 63.3 kg (139 lb 8 oz)                  Patient Active Problem List   Diagnosis     Pain of right upper extremity     HLD (hyperlipidemia)     Gastroesophageal reflux disease without esophagitis     Essential hypertension     Diverticulosis of large intestine     Past Surgical History:   Procedure Laterality Date     LAPAROSCOPIC CHOLECYSTECTOMY  2013      "  Social History     Tobacco Use     Smoking status: Former Smoker     Years: 17.00     Smokeless tobacco: Never Used   Substance Use Topics     Alcohol use: Yes     Alcohol/week: 2.0 standard drinks     Types: 2 Glasses of wine per week     Family History   Problem Relation Age of Onset     Cerebrovascular Disease Mother      Cerebrovascular Disease Father      Hypertension Mother      Diabetes Type 2  Mother      Breast Cancer Maternal Aunt 67.00     Uterine Cancer Maternal Aunt      Uterine Cancer Maternal Grandmother          Current Outpatient Medications   Medication Sig Dispense Refill     aspirin (ASA) 325 MG tablet Take 81 mg by mouth daily        Biotin 800 MCG TABS Take 1 tablet by mouth       cholecalciferol 25 MCG (1000 UT) TABS Take 1,000 Units by mouth       cyanocobalamin (VITAMIN B-12) 1000 MCG tablet Take by mouth daily       fenofibrate (TRICOR) 145 MG tablet Take 1 tablet by mouth once daily 90 tablet 3     lisinopril-hydrochlorothiazide (ZESTORETIC) 10-12.5 MG tablet Take 1 tablet by mouth once daily 90 tablet 2     omeprazole (PRILOSEC) 20 MG DR capsule Take 20 mg by mouth       Thiamine HCl (B-1) 500 MG TABS        Allergies   Allergen Reactions     Ibuprofen Hives     \"I have taken Advil twice and both times noticed a rash on my neck.  I have not taken it in many years now.\"     Atorvastatin Muscle Pain (Myalgia)     Couldn't use right arm     Reviewed HM    Review of Systems   Constitutional: Negative for chills and fever.   HENT: Negative for congestion, ear pain, hearing loss and sore throat.    Eyes: Negative for pain and visual disturbance.   Respiratory: Negative for cough and shortness of breath.    Cardiovascular: Positive for peripheral edema. Negative for chest pain and palpitations.   Gastrointestinal: Positive for heartburn. Negative for abdominal pain, constipation, diarrhea, hematochezia and nausea.   Breasts:  Negative for tenderness, breast mass and discharge.   Genitourinary: " "Positive for frequency and urgency. Negative for dysuria, genital sores, hematuria, pelvic pain, vaginal bleeding and vaginal discharge.   Musculoskeletal: Positive for myalgias. Negative for arthralgias and joint swelling.   Skin: Negative for rash.   Neurological: Negative for dizziness, weakness, headaches and paresthesias.   Psychiatric/Behavioral: Negative for mood changes. The patient is not nervous/anxious.          OBJECTIVE:   /60 (BP Location: Right arm, Patient Position: Sitting, Cuff Size: Adult Regular)   Pulse 59   Temp 98  F (36.7  C) (Oral)   Resp 12   Ht 1.62 m (5' 3.78\")   Wt 63.1 kg (139 lb 1.6 oz)   SpO2 99%   BMI 24.04 kg/m   Estimated body mass index is 24.04 kg/m  as calculated from the following:    Height as of this encounter: 1.62 m (5' 3.78\").    Weight as of this encounter: 63.1 kg (139 lb 1.6 oz).  Physical Exam  Constitutional:       General: She is not in acute distress.     Appearance: She is well-developed.   HENT:      Right Ear: Tympanic membrane and external ear normal.      Left Ear: Tympanic membrane and external ear normal.      Nose: Nose normal.      Mouth/Throat:      Pharynx: No oropharyngeal exudate.   Eyes:      General:         Right eye: No discharge.         Left eye: No discharge.      Conjunctiva/sclera: Conjunctivae normal.      Pupils: Pupils are equal, round, and reactive to light.   Neck:      Thyroid: No thyromegaly.      Trachea: No tracheal deviation.   Cardiovascular:      Rate and Rhythm: Normal rate and regular rhythm.      Pulses: Normal pulses.      Heart sounds: Normal heart sounds, S1 normal and S2 normal. No murmur heard.    No friction rub. No S3 or S4 sounds.   Pulmonary:      Effort: Pulmonary effort is normal. No respiratory distress.      Breath sounds: Normal breath sounds. No wheezing or rales.   Chest:   Breasts:      Right: No mass, nipple discharge or tenderness.      Left: No mass, nipple discharge or tenderness. "       Abdominal:      General: Bowel sounds are normal.      Palpations: Abdomen is soft. There is no mass.      Tenderness: There is no abdominal tenderness.   Musculoskeletal:         General: Normal range of motion.      Cervical back: Neck supple.   Lymphadenopathy:      Cervical: No cervical adenopathy.   Skin:     General: Skin is warm and dry.      Findings: No rash.   Neurological:      General: No focal deficit present.      Mental Status: She is alert and oriented to person, place, and time.      Motor: No abnormal muscle tone.      Deep Tendon Reflexes: Reflexes are normal and symmetric.   Psychiatric:         Mood and Affect: Mood normal.         Thought Content: Thought content normal.           Diagnostic Test Results:  Labs reviewed in Epic  Results for orders placed or performed in visit on 07/25/22   MA Screening Digital Bilateral     Status: None    Narrative    BILATERAL FULL FIELD DIGITAL SCREENING MAMMOGRAM    Performed on: 7/25/22    Compared to: 07/23/2021, 03/25/2019, 03/21/2018, and 02/20/2017    Technique: This study was evaluated with the assistance of Computer-Aided   Detection.    Findings: The breasts have scattered areas of fibroglandular density.    There is no radiographic evidence of malignancy.     IMPRESSION: ACR BI-RADS Category 1: Negative    RECOMMENDED FOLLOW-UP: Annual routine screening mammogram    The results and recommendations of this examination will be communicated   to the patient.     Results for orders placed or performed in visit on 07/25/22   Comprehensive metabolic panel (BMP + Alb, Alk Phos, ALT, AST, Total. Bili, TP)     Status: Normal   Result Value Ref Range    Sodium 142 136 - 145 mmol/L    Potassium 4.5 3.4 - 5.3 mmol/L    Creatinine 0.77 0.51 - 0.95 mg/dL    Urea Nitrogen 21.6 8.0 - 23.0 mg/dL    Chloride 105 98 - 107 mmol/L    Carbon Dioxide (CO2) 27 22 - 29 mmol/L    Anion Gap 10 7 - 15 mmol/L    Glucose 94 70 - 99 mg/dL    Calcium 10.1 8.8 - 10.2 mg/dL     Protein Total 7.3 6.4 - 8.3 g/dL    Albumin 4.6 3.5 - 5.2 g/dL    Bilirubin Total 0.6 <=1.2 mg/dL    Alkaline Phosphatase 56 35 - 104 U/L    AST 32 10 - 35 U/L    ALT 23 10 - 35 U/L    GFR Estimate 83 >60 mL/min/1.73m2   Lipid Profile (Chol, Trig, HDL, LDL calc)     Status: Normal   Result Value Ref Range    Cholesterol 158 <200 mg/dL    Triglycerides 45 <150 mg/dL    Direct Measure HDL 63 >=50 mg/dL    LDL Cholesterol Calculated 86 <=100 mg/dL    Non HDL Cholesterol 95 <130 mg/dL    Narrative    Cholesterol  Desirable:  <200 mg/dL    Triglycerides  Normal:  Less than 150 mg/dL  Borderline High:  150-199 mg/dL  High:  200-499 mg/dL  Very High:  Greater than or equal to 500 mg/dL    Direct Measure HDL  Female:  Greater than or equal to 50 mg/dL   Male:  Greater than or equal to 40 mg/dL    LDL Cholesterol  Desirable:  <100mg/dL  Above Desirable:  100-129 mg/dL   Borderline High:  130-159 mg/dL   High:  160-189 mg/dL   Very High:  >= 190 mg/dL    Non HDL Cholesterol  Desirable:  130 mg/dL  Above Desirable:  130-159 mg/dL  Borderline High:  160-189 mg/dL  High:  190-219 mg/dL  Very High:  Greater than or equal to 220 mg/dL   Vitamin D Deficiency     Status: Normal   Result Value Ref Range    Vitamin D, Total (25-Hydroxy) 42 20 - 75 ug/L    Narrative    Season, race, dietary intake, and treatment affect the concentration of 25-hydroxy-Vitamin D. Values may decrease during winter months and increase during summer months. Values 20-29 ug/L may indicate Vitamin D insufficiency and values <20 ug/L may indicate Vitamin D deficiency.    Vitamin D determination is routinely performed by an immunoassay specific for 25 hydroxyvitamin D3.  If an individual is on vitamin D2(ergocalciferol) supplementation, please specify 25 OH vitamin D2 and D3 level determination by LCMSMS test VITD23.     CBC with platelets     Status: Normal   Result Value Ref Range    WBC Count 4.3 4.0 - 11.0 10e3/uL    RBC Count 4.62 3.80 - 5.20 10e6/uL     "Hemoglobin 12.6 11.7 - 15.7 g/dL    Hematocrit 39.1 35.0 - 47.0 %    MCV 85 78 - 100 fL    MCH 27.3 26.5 - 33.0 pg    MCHC 32.2 31.5 - 36.5 g/dL    RDW 13.7 10.0 - 15.0 %    Platelet Count 262 150 - 450 10e3/uL       ASSESSMENT / PLAN:   1. Encounter for Medicare annual wellness exam  This is a 72 female here for AWV -     2. Mixed hyperlipidemia  Has elevated lipids - on Fenofibrate - check lipids  - Lipid Profile (Chol, Trig, HDL, LDL calc); Future  - Lipid Profile (Chol, Trig, HDL, LDL calc)    3. Vitamin D deficiency  H/o Vitamin D deficiency - check levels  - Vitamin D Deficiency; Future  - Vitamin D Deficiency    4. Gastroesophageal reflux disease without esophagitis  H/o GE reflux - symptoms relatively controlled - reviewed triggers    5. Essential hypertension  BP well controlled - check labs - continue medications   - Comprehensive metabolic panel (BMP + Alb, Alk Phos, ALT, AST, Total. Bili, TP); Future  - Comprehensive metabolic panel (BMP + Alb, Alk Phos, ALT, AST, Total. Bili, TP)    6. Alcohol dependence, daily use (H)  Drinks daily glass of wine - check hemogram   - CBC with platelets; Future  - CBC with platelets      Patient has been advised of split billing requirements and indicates understanding: Yes    COUNSELING:  Reviewed preventive health counseling, as reflected in patient instructions       Regular exercise       Healthy diet/nutrition    Estimated body mass index is 24.04 kg/m  as calculated from the following:    Height as of this encounter: 1.62 m (5' 3.78\").    Weight as of this encounter: 63.1 kg (139 lb 1.6 oz).        She reports that she has quit smoking. She quit after 17.00 years of use. She has never used smokeless tobacco.      Appropriate preventive services were discussed with this patient, including applicable screening as appropriate for cardiovascular disease, diabetes, osteopenia/osteoporosis, and glaucoma.  As appropriate for age/gender, discussed screening for colorectal " cancer, prostate cancer, breast cancer, and cervical cancer. Checklist reviewing preventive services available has been given to the patient.    Reviewed patients plan of care and provided an AVS. The Basic Care Plan (routine screening as documented in Health Maintenance) for Geneva meets the Care Plan requirement. This Care Plan has been established and reviewed with the Patient.    Counseling Resources:  ATP IV Guidelines  Pooled Cohorts Equation Calculator  Breast Cancer Risk Calculator  Breast Cancer: Medication to Reduce Risk  FRAX Risk Assessment  ICSI Preventive Guidelines  Dietary Guidelines for Americans, 2010  USDA's MyPlate  ASA Prophylaxis  Lung CA Screening    JEREMIAH CHAUHAN MD  [facility]    Identified Health Risks:

## 2022-08-06 DIAGNOSIS — E78.2 MIXED HYPERLIPIDEMIA: ICD-10-CM

## 2022-08-06 RX ORDER — FENOFIBRATE 145 MG/1
TABLET, COATED ORAL
Qty: 90 TABLET | Refills: 3 | Status: SHIPPED | OUTPATIENT
Start: 2022-08-06 | End: 2023-07-07

## 2022-08-07 NOTE — TELEPHONE ENCOUNTER
"Last Written Prescription Date:  8/3/21  Last Fill Quantity: 90,  # refills: 3   Last office visit provider:  7/25/22     Requested Prescriptions   Pending Prescriptions Disp Refills     fenofibrate (TRICOR) 145 MG tablet [Pharmacy Med Name: Fenofibrate 145 MG Oral Tablet] 90 tablet 0     Sig: Take 1 tablet by mouth once daily       Fibrates Passed - 8/6/2022  7:22 AM        Passed - Lipid panel on file in past 12 months     Recent Labs   Lab Test 07/25/22  0916   CHOL 158   TRIG 45   HDL 63   LDL 86   NHDL 95               Passed - No abnormal creatine kinase in past 12 months     No lab results found.             Passed - Recent (12 mo) or future (30 days) visit within the authorizing provider's specialty     Patient has had an office visit with the authorizing provider or a provider within the authorizing providers department within the previous 12 mos or has a future within next 30 days. See \"Patient Info\" tab in inbasket, or \"Choose Columns\" in Meds & Orders section of the refill encounter.              Passed - Medication is active on med list        Passed - Patient is age 18 or older        Passed - No active pregnancy on record        Passed - No positive pregnancy test in past 12 months             Arlet Cuba, RN 08/06/22 9:57 PM  "

## 2022-09-10 ENCOUNTER — HEALTH MAINTENANCE LETTER (OUTPATIENT)
Age: 72
End: 2022-09-10

## 2022-10-16 DIAGNOSIS — I10 ESSENTIAL HYPERTENSION: ICD-10-CM

## 2022-10-17 ENCOUNTER — MYC MEDICAL ADVICE (OUTPATIENT)
Dept: FAMILY MEDICINE | Facility: CLINIC | Age: 72
End: 2022-10-17

## 2022-10-17 DIAGNOSIS — I10 ESSENTIAL HYPERTENSION: ICD-10-CM

## 2022-10-17 RX ORDER — LISINOPRIL/HYDROCHLOROTHIAZIDE 10-12.5 MG
1 TABLET ORAL DAILY
Qty: 90 TABLET | Refills: 3 | Status: SHIPPED | OUTPATIENT
Start: 2022-10-17 | End: 2023-10-18

## 2022-10-17 NOTE — TELEPHONE ENCOUNTER
"Last Written Prescription Date:  1/18/22  Last Fill Quantity: 90,  # refills: 2   Last office visit provider:  7/25/22     Requested Prescriptions   Pending Prescriptions Disp Refills     lisinopril-hydrochlorothiazide (ZESTORETIC) 10-12.5 MG tablet [Pharmacy Med Name: Lisinopril-hydroCHLOROthiazide 10-12.5 MG Oral Tablet] 90 tablet 0     Sig: Take 1 tablet by mouth once daily       Diuretics (Including Combos) Protocol Passed - 10/17/2022  1:03 PM        Passed - Blood pressure under 140/90 in past 12 months     BP Readings from Last 3 Encounters:   07/25/22 100/60   03/29/22 (!) 148/80   07/23/21 118/73                 Passed - Recent (12 mo) or future (30 days) visit within the authorizing provider's specialty     Patient has had an office visit with the authorizing provider or a provider within the authorizing providers department within the previous 12 mos or has a future within next 30 days. See \"Patient Info\" tab in inbasket, or \"Choose Columns\" in Meds & Orders section of the refill encounter.              Passed - Medication is active on med list        Passed - Patient is age 18 or older        Passed - No active pregancy on record        Passed - Normal serum creatinine on file in past 12 months     Recent Labs   Lab Test 07/25/22  0916   CR 0.77              Passed - Normal serum potassium on file in past 12 months     Recent Labs   Lab Test 07/25/22  0916   POTASSIUM 4.5                    Passed - Normal serum sodium on file in past 12 months     Recent Labs   Lab Test 07/25/22  0916                 Passed - No positive pregnancy test in past 12 months       ACE Inhibitors (Including Combos) Protocol Passed - 10/17/2022  1:03 PM        Passed - Blood pressure under 140/90 in past 12 months     BP Readings from Last 3 Encounters:   07/25/22 100/60   03/29/22 (!) 148/80   07/23/21 118/73                 Passed - Recent (12 mo) or future (30 days) visit within the authorizing provider's specialty    " " Patient has had an office visit with the authorizing provider or a provider within the authorizing providers department within the previous 12 mos or has a future within next 30 days. See \"Patient Info\" tab in inbasket, or \"Choose Columns\" in Meds & Orders section of the refill encounter.              Passed - Medication is active on med list        Passed - Patient is age 18 or older        Passed - No active pregnancy on record        Passed - Normal serum creatinine on file in past 12 months     Recent Labs   Lab Test 07/25/22  0916   CR 0.77       Ok to refill medication if creatinine is low          Passed - Normal serum potassium on file in past 12 months     Recent Labs   Lab Test 07/25/22  0916   POTASSIUM 4.5             Passed - No positive pregnancy test within past 12 months             James Fajardo RN 10/17/22 1:04 PM  "

## 2022-10-18 RX ORDER — LISINOPRIL/HYDROCHLOROTHIAZIDE 10-12.5 MG
1 TABLET ORAL DAILY
Qty: 90 TABLET | Refills: 2 | Status: SHIPPED | OUTPATIENT
Start: 2022-10-18 | End: 2023-08-28

## 2022-10-19 ENCOUNTER — TRANSFERRED RECORDS (OUTPATIENT)
Dept: HEALTH INFORMATION MANAGEMENT | Facility: CLINIC | Age: 72
End: 2022-10-19

## 2022-10-20 ENCOUNTER — IMMUNIZATION (OUTPATIENT)
Dept: FAMILY MEDICINE | Facility: CLINIC | Age: 72
End: 2022-10-20
Payer: COMMERCIAL

## 2022-10-20 PROCEDURE — 90662 IIV NO PRSV INCREASED AG IM: CPT

## 2022-10-20 PROCEDURE — 90471 IMMUNIZATION ADMIN: CPT

## 2023-03-23 ENCOUNTER — TRANSFERRED RECORDS (OUTPATIENT)
Dept: HEALTH INFORMATION MANAGEMENT | Facility: CLINIC | Age: 73
End: 2023-03-23

## 2023-07-06 DIAGNOSIS — E78.2 MIXED HYPERLIPIDEMIA: ICD-10-CM

## 2023-07-07 RX ORDER — FENOFIBRATE 145 MG/1
TABLET, COATED ORAL
Qty: 90 TABLET | Refills: 0 | Status: SHIPPED | OUTPATIENT
Start: 2023-07-07 | End: 2023-11-27

## 2023-07-07 NOTE — TELEPHONE ENCOUNTER
"Last Written Prescription Date:  8/6/2022  Last Fill Quantity: 90,  # refills: 3   Last office visit provider:  7/25/2022     Requested Prescriptions   Pending Prescriptions Disp Refills     fenofibrate (TRICOR) 145 MG tablet [Pharmacy Med Name: Fenofibrate 145 MG Oral Tablet] 90 tablet 0     Sig: Take 1 tablet by mouth once daily       Fibrates Passed - 7/6/2023  5:28 PM        Passed - Lipid panel on file in past 12 months     Recent Labs   Lab Test 07/25/22  0916   CHOL 158   TRIG 45   HDL 63   LDL 86   NHDL 95               Passed - No abnormal creatine kinase in past 12 months     No lab results found.             Passed - Recent (12 mo) or future (30 days) visit within the authorizing provider's specialty     Patient has had an office visit with the authorizing provider or a provider within the authorizing providers department within the previous 12 mos or has a future within next 30 days. See \"Patient Info\" tab in inbasket, or \"Choose Columns\" in Meds & Orders section of the refill encounter.              Passed - Medication is active on med list        Passed - Patient is age 18 or older        Passed - No active pregnancy on record        Passed - No positive pregnancy test in past 12 months             Sola Mondragon RN 07/07/23 9:55 AM  "

## 2023-08-22 ASSESSMENT — ENCOUNTER SYMPTOMS
CHILLS: 0
EYE PAIN: 0
MYALGIAS: 0
PARESTHESIAS: 0
HEADACHES: 0
DYSURIA: 0
BREAST MASS: 0
WEAKNESS: 0
COUGH: 0
DIZZINESS: 0
CONSTIPATION: 0
NAUSEA: 0
HEMATOCHEZIA: 0
FREQUENCY: 0
NERVOUS/ANXIOUS: 0
DIARRHEA: 0
FEVER: 0
JOINT SWELLING: 0
ABDOMINAL PAIN: 0
SORE THROAT: 0
SHORTNESS OF BREATH: 0
HEMATURIA: 0
PALPITATIONS: 0
HEARTBURN: 0
ARTHRALGIAS: 0

## 2023-08-22 ASSESSMENT — ACTIVITIES OF DAILY LIVING (ADL): CURRENT_FUNCTION: NO ASSISTANCE NEEDED

## 2023-08-28 ENCOUNTER — OFFICE VISIT (OUTPATIENT)
Dept: FAMILY MEDICINE | Facility: CLINIC | Age: 73
End: 2023-08-28
Payer: COMMERCIAL

## 2023-08-28 VITALS
SYSTOLIC BLOOD PRESSURE: 138 MMHG | HEIGHT: 64 IN | WEIGHT: 139.25 LBS | BODY MASS INDEX: 23.77 KG/M2 | TEMPERATURE: 97.4 F | OXYGEN SATURATION: 97 % | HEART RATE: 60 BPM | DIASTOLIC BLOOD PRESSURE: 77 MMHG | RESPIRATION RATE: 20 BRPM

## 2023-08-28 DIAGNOSIS — K21.9 GASTROESOPHAGEAL REFLUX DISEASE WITHOUT ESOPHAGITIS: ICD-10-CM

## 2023-08-28 DIAGNOSIS — E55.9 VITAMIN D DEFICIENCY: Primary | ICD-10-CM

## 2023-08-28 DIAGNOSIS — E53.8 VITAMIN B12 DEFICIENCY (NON ANEMIC): ICD-10-CM

## 2023-08-28 DIAGNOSIS — E78.2 MIXED HYPERLIPIDEMIA: ICD-10-CM

## 2023-08-28 DIAGNOSIS — Z00.00 ENCOUNTER FOR MEDICARE ANNUAL WELLNESS EXAM: ICD-10-CM

## 2023-08-28 DIAGNOSIS — F10.20 ALCOHOL DEPENDENCE, DAILY USE (H): ICD-10-CM

## 2023-08-28 DIAGNOSIS — I10 ESSENTIAL HYPERTENSION: ICD-10-CM

## 2023-08-28 PROBLEM — K57.30 DIVERTICULOSIS OF LARGE INTESTINE: Status: RESOLVED | Noted: 2017-08-02 | Resolved: 2023-08-28

## 2023-08-28 LAB
ALBUMIN SERPL BCG-MCNC: 4.6 G/DL (ref 3.5–5.2)
ALP SERPL-CCNC: 65 U/L (ref 35–104)
ALT SERPL W P-5'-P-CCNC: 19 U/L (ref 0–50)
ANION GAP SERPL CALCULATED.3IONS-SCNC: 12 MMOL/L (ref 7–15)
AST SERPL W P-5'-P-CCNC: 28 U/L (ref 0–45)
BILIRUB SERPL-MCNC: 0.7 MG/DL
BUN SERPL-MCNC: 22.8 MG/DL (ref 8–23)
CALCIUM SERPL-MCNC: 10.1 MG/DL (ref 8.8–10.2)
CHLORIDE SERPL-SCNC: 102 MMOL/L (ref 98–107)
CHOLEST SERPL-MCNC: 180 MG/DL
CREAT SERPL-MCNC: 0.75 MG/DL (ref 0.51–0.95)
DEPRECATED CALCIDIOL+CALCIFEROL SERPL-MC: 47 UG/L (ref 20–75)
DEPRECATED HCO3 PLAS-SCNC: 25 MMOL/L (ref 22–29)
GFR SERPL CREATININE-BSD FRML MDRD: 85 ML/MIN/1.73M2
GLUCOSE SERPL-MCNC: 94 MG/DL (ref 70–99)
HDLC SERPL-MCNC: 65 MG/DL
LDLC SERPL CALC-MCNC: 106 MG/DL
NONHDLC SERPL-MCNC: 115 MG/DL
POTASSIUM SERPL-SCNC: 4.7 MMOL/L (ref 3.4–5.3)
PROT SERPL-MCNC: 7.4 G/DL (ref 6.4–8.3)
SODIUM SERPL-SCNC: 139 MMOL/L (ref 136–145)
TRIGL SERPL-MCNC: 44 MG/DL
VIT B12 SERPL-MCNC: 740 PG/ML (ref 232–1245)

## 2023-08-28 PROCEDURE — 80053 COMPREHEN METABOLIC PANEL: CPT | Performed by: FAMILY MEDICINE

## 2023-08-28 PROCEDURE — 82306 VITAMIN D 25 HYDROXY: CPT | Performed by: FAMILY MEDICINE

## 2023-08-28 PROCEDURE — 99214 OFFICE O/P EST MOD 30 MIN: CPT | Mod: 25 | Performed by: FAMILY MEDICINE

## 2023-08-28 PROCEDURE — 36415 COLL VENOUS BLD VENIPUNCTURE: CPT | Performed by: FAMILY MEDICINE

## 2023-08-28 PROCEDURE — 99397 PER PM REEVAL EST PAT 65+ YR: CPT | Performed by: FAMILY MEDICINE

## 2023-08-28 PROCEDURE — 80061 LIPID PANEL: CPT | Performed by: FAMILY MEDICINE

## 2023-08-28 PROCEDURE — 82607 VITAMIN B-12: CPT | Performed by: FAMILY MEDICINE

## 2023-08-28 ASSESSMENT — ENCOUNTER SYMPTOMS
DYSURIA: 0
HEARTBURN: 0
HEMATOCHEZIA: 0
WEAKNESS: 0
DIARRHEA: 0
CHILLS: 0
HEADACHES: 0
BREAST MASS: 0
COUGH: 0
CONSTIPATION: 0
FREQUENCY: 0
DIZZINESS: 0
EYE PAIN: 0
MYALGIAS: 0
PALPITATIONS: 0
SHORTNESS OF BREATH: 0
PARESTHESIAS: 0
ARTHRALGIAS: 0
HEMATURIA: 0
JOINT SWELLING: 0
NAUSEA: 0
ABDOMINAL PAIN: 0
SORE THROAT: 0
NERVOUS/ANXIOUS: 0
FEVER: 0

## 2023-08-28 ASSESSMENT — ACTIVITIES OF DAILY LIVING (ADL): CURRENT_FUNCTION: NO ASSISTANCE NEEDED

## 2023-08-28 ASSESSMENT — PAIN SCALES - GENERAL: PAINLEVEL: NO PAIN (0)

## 2023-08-28 NOTE — PROGRESS NOTES
"SUBJECTIVE:   Geneva is a 73 year old who presents for Preventive Visit.      8/28/2023     7:50 AM   Additional Questions   Roomed by Angi MAYS.       Are you in the first 12 months of your Medicare coverage?  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  Good    Frequency of exercise:  4-5 days/week    Duration of exercise:  15-30 minutes    Do you usually eat at least 4 servings of fruit and vegetables a day, include whole grains    & fiber and avoid regularly eating high fat or \"junk\" foods?  Yes    Taking medications regularly:  Yes    Medication side effects:  None    Ability to successfully perform activities of daily living:  No assistance needed    Home Safety:  No safety concerns identified    Hearing Impairment:  No hearing concerns    In the past 6 months, have you been bothered by leaking of urine?  No    In general, how would you rate your overall mental or emotional health?  Excellent    Additional concerns today:  No        Have you ever done Advance Care Planning? (For example, a Health Directive, POLST, or a discussion with a medical provider or your loved ones about your wishes): No, advance care planning information given to patient to review.  Patient declined advance care planning discussion at this time.       Fall risk  Fallen 2 or more times in the past year?: No  Any fall with injury in the past year?: No    Cognitive Screening   1) Repeat 3 items (River, Nation, Finger)    2) Clock draw: NORMAL  3) 3 item recall: Recalls 3 objects  Results: 3 items recalled: COGNITIVE IMPAIRMENT LESS LIKELY    Mini-CogTM Copyright S Ruthann. Licensed by the author for " use in [facility]; reprinted with permission (milamikael@[employer]). All rights reserved.          Reviewed and updated as needed this visit by clinical staff   Tobacco  Allergies  Meds              Reviewed and updated as needed this visit by Provider                 Social History     Tobacco Use    Smoking status: Former     Years: 17.00     Types: Cigarettes    Smokeless tobacco: Never   Substance Use Topics    Alcohol use: Yes     Alcohol/week: 2.0 standard drinks of alcohol     Types: 2 Glasses of wine per week             8/22/2023     4:18 PM   Alcohol Use   Prescreen: >3 drinks/day or >7 drinks/week? No     Do you have a current opioid prescription? No  Do you use any other controlled substances or medications that are not prescribed by a provider? Alcohol      Current providers sharing in care for this patient include:   Patient Care Team:  Michelle Hogue MD as PCP - General (Family Medicine)  Melody Oneal MD as MD (Neurology)  Michelle Hogue MD as Assigned PCP    The following health maintenance items are reviewed in Epic and correct as of today:  Health Maintenance   Topic Date Due    LUNG CANCER SCREENING  Never done    COVID-19 Vaccine (6 - Pfizer series) 02/01/2023    MEDICARE ANNUAL WELLNESS VISIT  07/25/2023    ANNUAL REVIEW OF HM ORDERS  07/25/2023    INFLUENZA VACCINE (1) 09/01/2023    MAMMO SCREENING  07/25/2024    FALL RISK ASSESSMENT  08/28/2024    LIPID  07/25/2027    COLORECTAL CANCER SCREENING  07/31/2027    DTAP/TDAP/TD IMMUNIZATION (3 - Td or Tdap) 03/21/2028    ADVANCE CARE PLANNING  08/28/2028    DEXA  08/04/2036    HEPATITIS C SCREENING  Completed    PHQ-2 (once per calendar year)  Completed    Pneumococcal Vaccine: 65+ Years  Completed    ZOSTER IMMUNIZATION  Completed    IPV IMMUNIZATION  Aged Out    MENINGITIS IMMUNIZATION  Aged Out     BP Readings from Last 3 Encounters:   08/28/23 138/77   07/25/22 100/60   03/29/22 (!) 148/80    Wt  "Readings from Last 3 Encounters:   08/28/23 63.2 kg (139 lb 4 oz)   07/25/22 63.1 kg (139 lb 1.6 oz)   03/29/22 63.5 kg (140 lb)                  Patient Active Problem List   Diagnosis    Pain of right upper extremity    HLD (hyperlipidemia)    Gastroesophageal reflux disease without esophagitis    Essential hypertension    Diverticulosis of large intestine     Past Surgical History:   Procedure Laterality Date    LAPAROSCOPIC CHOLECYSTECTOMY  2013       Social History     Tobacco Use    Smoking status: Former     Years: 17.00     Types: Cigarettes    Smokeless tobacco: Never   Substance Use Topics    Alcohol use: Yes     Alcohol/week: 2.0 standard drinks of alcohol     Types: 2 Glasses of wine per week     Family History   Problem Relation Age of Onset    Cerebrovascular Disease Mother     Cerebrovascular Disease Father     Hypertension Mother     Diabetes Type 2  Mother     Breast Cancer Maternal Aunt 67.00    Uterine Cancer Maternal Aunt     Uterine Cancer Maternal Grandmother          Current Outpatient Medications   Medication Sig Dispense Refill    aspirin (ASA) 325 MG tablet Take 81 mg by mouth daily       Biotin 800 MCG TABS Take 1 tablet by mouth      cholecalciferol 25 MCG (1000 UT) TABS Take 1,000 Units by mouth      cyanocobalamin (VITAMIN B-12) 1000 MCG tablet Take by mouth daily      fenofibrate (TRICOR) 145 MG tablet Take 1 tablet by mouth once daily 90 tablet 0    lisinopril-hydrochlorothiazide (ZESTORETIC) 10-12.5 MG tablet Take 1 tablet by mouth daily 90 tablet 3    omeprazole (PRILOSEC) 20 MG DR capsule Take 20 mg by mouth      Thiamine HCl (B-1) 500 MG TABS        Allergies   Allergen Reactions    Ibuprofen Hives     \"I have taken Advil twice and both times noticed a rash on my neck.  I have not taken it in many years now.\"    Atorvastatin Muscle Pain (Myalgia)     Couldn't use right arm             Review of Systems   Constitutional:  Negative for chills and fever.   HENT:  Negative for " "congestion, ear pain, hearing loss and sore throat.    Eyes:  Negative for pain and visual disturbance.   Respiratory:  Negative for cough and shortness of breath.    Cardiovascular:  Negative for chest pain, palpitations and peripheral edema.   Gastrointestinal:  Negative for abdominal pain, constipation, diarrhea, heartburn, hematochezia and nausea.   Breasts:  Negative for tenderness, breast mass and discharge.   Genitourinary:  Negative for dysuria, frequency, genital sores, hematuria, pelvic pain, urgency, vaginal bleeding and vaginal discharge.   Musculoskeletal:  Negative for arthralgias, joint swelling and myalgias.   Skin:  Negative for rash.   Neurological:  Negative for dizziness, weakness, headaches and paresthesias.   Psychiatric/Behavioral:  Negative for mood changes. The patient is not nervous/anxious.          OBJECTIVE:   /77 (BP Location: Left arm, Patient Position: Sitting, Cuff Size: Adult Regular)   Pulse 60   Temp 97.4  F (36.3  C)   Resp 20   Ht 1.617 m (5' 3.66\")   Wt 63.2 kg (139 lb 4 oz)   LMP  (LMP Unknown)   SpO2 97%   BMI 24.16 kg/m   Estimated body mass index is 24.16 kg/m  as calculated from the following:    Height as of this encounter: 1.617 m (5' 3.66\").    Weight as of this encounter: 63.2 kg (139 lb 4 oz).  Physical Exam  Vitals reviewed.   Constitutional:       General: She is not in acute distress.     Appearance: Normal appearance.   HENT:      Head: Normocephalic.      Right Ear: Tympanic membrane, ear canal and external ear normal.      Left Ear: Tympanic membrane, ear canal and external ear normal.      Nose: Nose normal.      Mouth/Throat:      Mouth: Mucous membranes are moist.      Pharynx: No posterior oropharyngeal erythema.   Eyes:      Extraocular Movements: Extraocular movements intact.      Conjunctiva/sclera: Conjunctivae normal.      Pupils: Pupils are equal, round, and reactive to light.   Cardiovascular:      Rate and Rhythm: Normal rate and " regular rhythm.      Pulses: Normal pulses.      Heart sounds: Normal heart sounds. No murmur heard.  Pulmonary:      Effort: Pulmonary effort is normal.      Breath sounds: Normal breath sounds.   Abdominal:      Palpations: Abdomen is soft. There is no mass.      Tenderness: There is no abdominal tenderness. There is no guarding or rebound.   Musculoskeletal:         General: No deformity. Normal range of motion.      Cervical back: Normal range of motion and neck supple.   Lymphadenopathy:      Cervical: No cervical adenopathy.   Skin:     General: Skin is warm and dry.   Neurological:      General: No focal deficit present.      Mental Status: She is alert.   Psychiatric:         Mood and Affect: Mood normal.         Behavior: Behavior normal.           Diagnostic Test Results:  Labs reviewed in Epic  Results for orders placed or performed in visit on 08/28/23   Lipid Profile (Chol, Trig, HDL, LDL calc)     Status: Abnormal   Result Value Ref Range    Cholesterol 180 <200 mg/dL    Triglycerides 44 <150 mg/dL    Direct Measure HDL 65 >=50 mg/dL    LDL Cholesterol Calculated 106 (H) <=100 mg/dL    Non HDL Cholesterol 115 <130 mg/dL    Narrative    Cholesterol  Desirable:  <200 mg/dL    Triglycerides  Normal:  Less than 150 mg/dL  Borderline High:  150-199 mg/dL  High:  200-499 mg/dL  Very High:  Greater than or equal to 500 mg/dL    Direct Measure HDL  Female:  Greater than or equal to 50 mg/dL   Male:  Greater than or equal to 40 mg/dL    LDL Cholesterol  Desirable:  <100mg/dL  Above Desirable:  100-129 mg/dL   Borderline High:  130-159 mg/dL   High:  160-189 mg/dL   Very High:  >= 190 mg/dL    Non HDL Cholesterol  Desirable:  130 mg/dL  Above Desirable:  130-159 mg/dL  Borderline High:  160-189 mg/dL  High:  190-219 mg/dL  Very High:  Greater than or equal to 220 mg/dL   Comprehensive metabolic panel (BMP + Alb, Alk Phos, ALT, AST, Total. Bili, TP)     Status: Normal   Result Value Ref Range    Sodium 139 136 -  145 mmol/L    Potassium 4.7 3.4 - 5.3 mmol/L    Chloride 102 98 - 107 mmol/L    Carbon Dioxide (CO2) 25 22 - 29 mmol/L    Anion Gap 12 7 - 15 mmol/L    Urea Nitrogen 22.8 8.0 - 23.0 mg/dL    Creatinine 0.75 0.51 - 0.95 mg/dL    Calcium 10.1 8.8 - 10.2 mg/dL    Glucose 94 70 - 99 mg/dL    Alkaline Phosphatase 65 35 - 104 U/L    AST 28 0 - 45 U/L    ALT 19 0 - 50 U/L    Protein Total 7.4 6.4 - 8.3 g/dL    Albumin 4.6 3.5 - 5.2 g/dL    Bilirubin Total 0.7 <=1.2 mg/dL    GFR Estimate 85 >60 mL/min/1.73m2   Vitamin B12     Status: Normal   Result Value Ref Range    Vitamin B12 740 232 - 1,245 pg/mL   Vitamin D Deficiency     Status: Normal   Result Value Ref Range    Vitamin D, Total (25-Hydroxy) 47 20 - 75 ug/L    Narrative    Season, race, dietary intake, and treatment affect the concentration of 25-hydroxy-Vitamin D. Values may decrease during winter months and increase during summer months. Values 20-29 ug/L may indicate Vitamin D insufficiency and values <20 ug/L may indicate Vitamin D deficiency.    Vitamin D determination is routinely performed by an immunoassay specific for 25 hydroxyvitamin D3.  If an individual is on vitamin D2(ergocalciferol) supplementation, please specify 25 OH vitamin D2 and D3 level determination by LCMSMS test [id number].         ASSESSMENT / PLAN:   1. Encounter for Medicare annual wellness exam  This is a 74 yo female here for AWV     2. Vitamin D deficiency  H/o low Vitamin D - takes supplements - check levels   - Vitamin D Deficiency; Future  - Vitamin D Deficiency    3. Essential hypertension  Blood pressure is stable - at goal - continue current medications - will need refills -   - Comprehensive metabolic panel (BMP + Alb, Alk Phos, ALT, AST, Total. Bili, TP); Future  - Comprehensive metabolic panel (BMP + Alb, Alk Phos, ALT, AST, Total. Bili, TP)    4. Gastroesophageal reflux disease without esophagitis  H/o GE reflux - controlled with Omeprazole - takes occasionally     5. Vitamin  B12 deficiency (non anemic)  H/o low Vitamin B12 - check levels   - Vitamin B12; Future  - Vitamin B12    6. Mixed hyperlipidemia  Elevated lipids - on Fenofibrate - check labs -   - Lipid Profile (Chol, Trig, HDL, LDL calc); Future  - Comprehensive metabolic panel (BMP + Alb, Alk Phos, ALT, AST, Total. Bili, TP); Future  - Lipid Profile (Chol, Trig, HDL, LDL calc)  - Comprehensive metabolic panel (BMP + Alb, Alk Phos, ALT, AST, Total. Bili, TP)    7. Alcohol dependence, daily use (H)  Drinks daily alcohol - 2 glasses of wine daily      Patient has been advised of split billing requirements and indicates understanding: Yes      COUNSELING:  Reviewed preventive health counseling, as reflected in patient instructions       Regular exercise       Healthy diet/nutrition        She reports that she has quit smoking. She has never used smokeless tobacco.      Appropriate preventive services were discussed with this patient, including applicable screening as appropriate for cardiovascular disease, diabetes, osteopenia/osteoporosis, and glaucoma.  As appropriate for age/gender, discussed screening for colorectal cancer, prostate cancer, breast cancer, and cervical cancer. Checklist reviewing preventive services available has been given to the patient.    Reviewed patients plan of care and provided an AVS. The Basic Care Plan (routine screening as documented in Health Maintenance) for Geneva meets the Care Plan requirement. This Care Plan has been established and reviewed with the Patient.          JEREMIAH CHAUHAN MD  [facility]    Identified Health Risks:  I have reviewed Opioid Use Disorder and Substance Use Disorder risk factors and made any needed referrals.

## 2023-08-28 NOTE — PATIENT INSTRUCTIONS
Patient Education   Personalized Prevention Plan  You are due for the preventive services outlined below.  Your care team is available to assist you in scheduling these services.  If you have already completed any of these items, please share that information with your care team to update in your medical record.  Health Maintenance Due   Topic Date Due     COVID-19 Vaccine (6 - Pfizer series) 02/01/2023     Annual Wellness Visit  07/25/2023     Flu Vaccine (1) 09/01/2023

## 2023-08-29 ENCOUNTER — ANCILLARY PROCEDURE (OUTPATIENT)
Dept: MAMMOGRAPHY | Facility: CLINIC | Age: 73
End: 2023-08-29
Attending: FAMILY MEDICINE
Payer: COMMERCIAL

## 2023-08-29 DIAGNOSIS — Z12.31 VISIT FOR SCREENING MAMMOGRAM: ICD-10-CM

## 2023-08-29 PROCEDURE — 77067 SCR MAMMO BI INCL CAD: CPT | Mod: TC | Performed by: RADIOLOGY

## 2023-10-10 ENCOUNTER — IMMUNIZATION (OUTPATIENT)
Dept: PEDIATRICS | Facility: CLINIC | Age: 73
End: 2023-10-10
Payer: COMMERCIAL

## 2023-10-10 DIAGNOSIS — Z23 NEED FOR PROPHYLACTIC VACCINATION AND INOCULATION AGAINST INFLUENZA: Primary | ICD-10-CM

## 2023-10-10 PROCEDURE — 90471 IMMUNIZATION ADMIN: CPT

## 2023-10-10 PROCEDURE — 90662 IIV NO PRSV INCREASED AG IM: CPT

## 2023-10-10 PROCEDURE — 99207 PR NO CHARGE NURSE ONLY: CPT

## 2023-10-18 DIAGNOSIS — I10 ESSENTIAL HYPERTENSION: ICD-10-CM

## 2023-10-18 RX ORDER — LISINOPRIL/HYDROCHLOROTHIAZIDE 10-12.5 MG
1 TABLET ORAL DAILY
Qty: 90 TABLET | Refills: 2 | Status: SHIPPED | OUTPATIENT
Start: 2023-10-18 | End: 2024-07-29

## 2023-11-26 DIAGNOSIS — E78.2 MIXED HYPERLIPIDEMIA: ICD-10-CM

## 2023-11-27 RX ORDER — FENOFIBRATE 145 MG/1
TABLET, COATED ORAL
Qty: 90 TABLET | Refills: 2 | Status: SHIPPED | OUTPATIENT
Start: 2023-11-27 | End: 2024-08-06

## 2024-07-01 ENCOUNTER — OFFICE VISIT (OUTPATIENT)
Dept: CARDIOLOGY | Facility: CLINIC | Age: 74
End: 2024-07-01
Payer: COMMERCIAL

## 2024-07-01 VITALS
WEIGHT: 143 LBS | HEART RATE: 64 BPM | SYSTOLIC BLOOD PRESSURE: 136 MMHG | BODY MASS INDEX: 24.41 KG/M2 | HEIGHT: 64 IN | DIASTOLIC BLOOD PRESSURE: 64 MMHG

## 2024-07-01 DIAGNOSIS — I10 PRIMARY HYPERTENSION: ICD-10-CM

## 2024-07-01 DIAGNOSIS — I25.118 CORONARY ARTERY DISEASE INVOLVING NATIVE CORONARY ARTERY OF NATIVE HEART WITH OTHER FORM OF ANGINA PECTORIS (H): Primary | ICD-10-CM

## 2024-07-01 DIAGNOSIS — E78.2 MIXED HYPERLIPIDEMIA: ICD-10-CM

## 2024-07-01 PROCEDURE — 93000 ELECTROCARDIOGRAM COMPLETE: CPT | Performed by: INTERNAL MEDICINE

## 2024-07-01 PROCEDURE — 99204 OFFICE O/P NEW MOD 45 MIN: CPT | Performed by: INTERNAL MEDICINE

## 2024-07-01 RX ORDER — MV-MIN/FA/VIT K/LUTEIN/ZEAXANT 200MCG-5MG
CAPSULE ORAL
COMMUNITY
End: 2024-09-04

## 2024-07-01 NOTE — LETTER
7/1/2024    JEREMIAH CHAUHAN MD  [address] [facility] [address]    RE: Geneva Bond       Dear Colleague,     I had the pleasure of seeing Geneva Bond in the [facility].  CARDIOLOGY CLINIC CONSULTATION      REASON FOR CONSULT:   Chest tightness    PRIMARY CARE PHYSICIAN:  JEREMIAH CHAUHAN        History of Present Illness  Geneva Bond is an extremely pleasant 74 year old female here as a new patient.  PMH of HTN and HLD, with severe myalgia to atorvastatin.  No FH of CAD or other heart disease.  Former tobacco abuse (quit ~ 2004).  Drinks 1-2 glasses of wine and 1-2 cups of coffee per day.    Here for evaluation due to exertional chest tightness which has been present since April 2024.  Occurs after 10 minutes, then goes away after 1-2 minutes of rest.  Also can occur with mental/emotional stress. No symptoms without exertion/stress provoking.  Not getting worse.  Prior years she could walk 30-40 minutes at a time every day with no symptoms.  She does note that she got a cat in Fall 2023, and also had wood jesse re-done in April 2024, but other than that no other provoking factors.    ECG in clinic today shows sinus dereje with Q waves in V1-V2.  Prior ECG from 2020 had significant motion artifact in lead V2, but probably was similar.  Prior CTA of the head/neck showed only mild plaque but no severe narrowings.  No echo or ischemic testing in the past.  Labs from 8/2023 showed , HDL 65, , TF 44, normal Na/K, normal Cr, and normal ALT.      Assessment & Plan    Exertional chest tightness: symptoms are fairly typical, in patient with several risk factors for CAD:  Moderate-high likelihood of obstructive CAD  HTN  HLD  Former tobacco abuse      It was a pleasure to speak with Geneva in clinic today.  We discussed several reasons for her exertional chest tightness.  While I think it is entirely possible that new allergens (cat dander, home  "remodel) have led to some new bronchospasm, or that she is starting to see some early COPD given her prior smoking, she also has several risk factors for CAD and I think we need to investigate this.  We talked about various options for doing this, including invasive angiography, functional stress testing, and coronary CTA.  Ultimately I recommended coronary CTA, and she is in agreement with this.     - Coronary CTA  - Further plans pending CCTA results  - If CCTA is entirely normal, no routine cardiology follow-up is required, and would consider PFT's and allergy referral as next steps.        Tab Copeland MD  Interventional Cardiology  July 1, 2024          Medications  Current Outpatient Medications   Medication Sig Dispense Refill    aspirin (ASA) 325 MG tablet Take 325 mg by mouth daily      fenofibrate (TRICOR) 145 MG tablet Take 1 tablet by mouth once daily 90 tablet 2    lisinopril-hydrochlorothiazide (ZESTORETIC) 10-12.5 MG tablet Take 1 tablet by mouth once daily 90 tablet 2    Multiple Vitamins-Minerals (PRESERVISION AREDS 2+MULTI VIT) CAPS        No current facility-administered medications for this visit.     Allergies  Allergies   Allergen Reactions    Ibuprofen Hives     \"I have taken Advil twice and both times noticed a rash on my neck.  I have not taken it in many years now.\"    Atorvastatin Muscle Pain (Myalgia)     Couldn't use right arm         Physical Exam      BP: 136/64 Pulse: 64            Vital Signs with Ranges  Pulse:  [64] 64  BP: (136)/(64) 136/64  143 lbs 0 oz    Constitutional:  Well appearing, NAD  Respiratory: Normal respiratory effort, CTAB  Cardiovascular: RRR, no m/r/g.  JVP < 7 cm H2O.  There is no LE edema.  Normal carotid upstrokes, no carotid bruits.      Thank you for allowing me to participate in the care of your patient.      Sincerely,     Tab Copeland MD     [facility]  cc:   No referring provider " defined for this encounter.

## 2024-07-01 NOTE — PROGRESS NOTES
CARDIOLOGY CLINIC CONSULTATION      REASON FOR CONSULT:   Chest tightness    PRIMARY CARE PHYSICIAN:  JEREMIAH CHAUHAN        History of Present Illness   Geneva Bond is an extremely pleasant 74 year old female here as a new patient.  PMH of HTN and HLD, with severe myalgia to atorvastatin.  No FH of CAD or other heart disease.  Former tobacco abuse (quit ~ 2004).  Drinks 1-2 glasses of wine and 1-2 cups of coffee per day.    Here for evaluation due to exertional chest tightness which has been present since April 2024.  Occurs after 10 minutes, then goes away after 1-2 minutes of rest.  Also can occur with mental/emotional stress. No symptoms without exertion/stress provoking.  Not getting worse.  Prior years she could walk 30-40 minutes at a time every day with no symptoms.  She does note that she got a cat in Fall 2023, and also had wood jesse re-done in April 2024, but other than that no other provoking factors.    ECG in clinic today shows sinus dereje with Q waves in V1-V2.  Prior ECG from 2020 had significant motion artifact in lead V2, but probably was similar.  Prior CTA of the head/neck showed only mild plaque but no severe narrowings.  No echo or ischemic testing in the past.  Labs from 8/2023 showed , HDL 65, , TF 44, normal Na/K, normal Cr, and normal ALT.      Assessment & Plan     Exertional chest tightness: symptoms are fairly typical, in patient with several risk factors for CAD:  Moderate-high likelihood of obstructive CAD  HTN  HLD  Former tobacco abuse      It was a pleasure to speak with Geneva in clinic today.  We discussed several reasons for her exertional chest tightness.  While I think it is entirely possible that new allergens (cat dander, home remodel) have led to some new bronchospasm, or that she is starting to see some early COPD given her prior smoking, she also has several risk factors for CAD and I think we need to investigate this.  We talked about  "various options for doing this, including invasive angiography, functional stress testing, and coronary CTA.  Ultimately I recommended coronary CTA, and she is in agreement with this.     - Coronary CTA  - Further plans pending CCTA results  - If CCTA is entirely normal, no routine cardiology follow-up is required, and would consider PFT's and allergy referral as next steps.        Tab Copeland MD  Interventional Cardiology  July 1, 2024          Medications   Current Outpatient Medications   Medication Sig Dispense Refill    aspirin (ASA) 325 MG tablet Take 325 mg by mouth daily      fenofibrate (TRICOR) 145 MG tablet Take 1 tablet by mouth once daily 90 tablet 2    lisinopril-hydrochlorothiazide (ZESTORETIC) 10-12.5 MG tablet Take 1 tablet by mouth once daily 90 tablet 2    Multiple Vitamins-Minerals (PRESERVISION AREDS 2+MULTI VIT) CAPS        No current facility-administered medications for this visit.     Allergies   Allergies   Allergen Reactions    Ibuprofen Hives     \"I have taken Advil twice and both times noticed a rash on my neck.  I have not taken it in many years now.\"    Atorvastatin Muscle Pain (Myalgia)     Couldn't use right arm         Physical Exam       BP: 136/64 Pulse: 64            Vital Signs with Ranges  Pulse:  [64] 64  BP: (136)/(64) 136/64  143 lbs 0 oz    Constitutional:  Well appearing, NAD  Respiratory: Normal respiratory effort, CTAB  Cardiovascular: RRR, no m/r/g.  JVP < 7 cm H2O.  There is no LE edema.  Normal carotid upstrokes, no carotid bruits.    "

## 2024-07-16 ENCOUNTER — TELEPHONE (OUTPATIENT)
Dept: CARDIOLOGY | Facility: CLINIC | Age: 74
End: 2024-07-16
Payer: COMMERCIAL

## 2024-07-16 NOTE — TELEPHONE ENCOUNTER
[facility] Call Center    Phone Message    May a detailed message be left on voicemail: yes     Reason for Call: Other: Patient is scheduled to have CTA angiogram tomorrow 07/17 @ 9am, and called their insurance to make sure everything was ok but was told they have not received any prior auth from ordering provider. Please review and send prior auth to insurance as soon as possible, so patient can still have testing complete. Please call patient to further discuss.     Action Taken: Other: Cardiology    Travel Screening: Not Applicable     Thank you!  [facility]

## 2024-07-16 NOTE — TELEPHONE ENCOUNTER
Discussed with pt and financial securing. Pt's authorization is still pending. Discussed with pt that we typically recommend rescheduling the test and pushing out a week or so to allow time to receive authorization. Discussed pt may be responsible for the cost if the test is completed before we receive authorization and it is not approved by insurance. Pt stated she is not willing to the reschedule the test as she would like to get the test done ASAP and she continues to have chest pain symptoms. Provided phone number for business office. Pt stated she will call her insurance company again to check on the status.     Addendum 7/16/24 - Received VM from pt stating she spoke with her insurance company who advised the CTA had been approved. Pt stated she will be keeping her visit tomorrow as scheduled. Message sent to [employer].

## 2024-07-17 ENCOUNTER — HOSPITAL ENCOUNTER (OUTPATIENT)
Dept: CARDIOLOGY | Facility: CLINIC | Age: 74
Discharge: HOME | End: 2024-07-17
Attending: INTERNAL MEDICINE | Admitting: INTERNAL MEDICINE
Payer: COMMERCIAL

## 2024-07-17 ENCOUNTER — TELEPHONE (OUTPATIENT)
Dept: CARDIOLOGY | Facility: CLINIC | Age: 74
End: 2024-07-17

## 2024-07-17 VITALS — SYSTOLIC BLOOD PRESSURE: 142 MMHG | DIASTOLIC BLOOD PRESSURE: 90 MMHG | HEART RATE: 69 BPM

## 2024-07-17 DIAGNOSIS — I25.119 CORONARY ARTERY DISEASE INVOLVING NATIVE CORONARY ARTERY OF NATIVE HEART WITH ANGINA PECTORIS (H): Primary | ICD-10-CM

## 2024-07-17 DIAGNOSIS — R93.1 ABNORMAL FINDINGS ON DIAGNOSTIC IMAGING OF HEART AND CORONARY CIRCULATION: ICD-10-CM

## 2024-07-17 DIAGNOSIS — I25.118 CORONARY ARTERY DISEASE INVOLVING NATIVE CORONARY ARTERY OF NATIVE HEART WITH OTHER FORM OF ANGINA PECTORIS (H): ICD-10-CM

## 2024-07-17 LAB
CREAT BLD-MCNC: 0.9 MG/DL (ref 0.5–1)
EGFRCR SERPLBLD CKD-EPI 2021: >60 ML/MIN/1.73M2

## 2024-07-17 PROCEDURE — 75574 CT ANGIO HRT W/3D IMAGE: CPT | Mod: 26 | Performed by: INTERNAL MEDICINE

## 2024-07-17 PROCEDURE — 250N000011 HC RX IP 250 OP 636: Performed by: INTERNAL MEDICINE

## 2024-07-17 PROCEDURE — 82565 ASSAY OF CREATININE: CPT

## 2024-07-17 PROCEDURE — 75574 CT ANGIO HRT W/3D IMAGE: CPT

## 2024-07-17 PROCEDURE — 250N000009 HC RX 250: Performed by: INTERNAL MEDICINE

## 2024-07-17 PROCEDURE — 250N000013 HC RX MED GY IP 250 OP 250 PS 637: Performed by: INTERNAL MEDICINE

## 2024-07-17 RX ORDER — DILTIAZEM HYDROCHLORIDE 5 MG/ML
10-15 INJECTION INTRAVENOUS
Status: DISCONTINUED | OUTPATIENT
Start: 2024-07-17 | End: 2024-07-18 | Stop reason: HOSPADM

## 2024-07-17 RX ORDER — IOPAMIDOL 755 MG/ML
500 INJECTION, SOLUTION INTRAVASCULAR ONCE
Status: COMPLETED | OUTPATIENT
Start: 2024-07-17 | End: 2024-07-17

## 2024-07-17 RX ORDER — METHYLPREDNISOLONE SODIUM SUCCINATE 125 MG/2ML
125 INJECTION, POWDER, LYOPHILIZED, FOR SOLUTION INTRAMUSCULAR; INTRAVENOUS
Status: DISCONTINUED | OUTPATIENT
Start: 2024-07-17 | End: 2024-07-18 | Stop reason: HOSPADM

## 2024-07-17 RX ORDER — DIPHENHYDRAMINE HYDROCHLORIDE 50 MG/ML
25-50 INJECTION INTRAMUSCULAR; INTRAVENOUS
Status: DISCONTINUED | OUTPATIENT
Start: 2024-07-17 | End: 2024-07-18 | Stop reason: HOSPADM

## 2024-07-17 RX ORDER — METOPROLOL TARTRATE 25 MG/1
25-100 TABLET, FILM COATED ORAL
Status: COMPLETED | OUTPATIENT
Start: 2024-07-17 | End: 2024-07-17

## 2024-07-17 RX ORDER — ONDANSETRON 2 MG/ML
4 INJECTION INTRAMUSCULAR; INTRAVENOUS
Status: DISCONTINUED | OUTPATIENT
Start: 2024-07-17 | End: 2024-07-18 | Stop reason: HOSPADM

## 2024-07-17 RX ORDER — DIPHENHYDRAMINE HCL 25 MG
25 CAPSULE ORAL
Status: DISCONTINUED | OUTPATIENT
Start: 2024-07-17 | End: 2024-07-18 | Stop reason: HOSPADM

## 2024-07-17 RX ORDER — IVABRADINE 5 MG/1
5-15 TABLET, FILM COATED ORAL
Status: COMPLETED | OUTPATIENT
Start: 2024-07-17 | End: 2024-07-17

## 2024-07-17 RX ORDER — DILTIAZEM HCL 60 MG
120 TABLET ORAL
Status: DISCONTINUED | OUTPATIENT
Start: 2024-07-17 | End: 2024-07-18 | Stop reason: HOSPADM

## 2024-07-17 RX ORDER — LIDOCAINE 40 MG/G
CREAM TOPICAL
Status: CANCELLED | OUTPATIENT
Start: 2024-07-17

## 2024-07-17 RX ORDER — NITROGLYCERIN 0.4 MG/1
0.4 TABLET SUBLINGUAL
Status: DISCONTINUED | OUTPATIENT
Start: 2024-07-17 | End: 2024-07-18 | Stop reason: HOSPADM

## 2024-07-17 RX ORDER — METOPROLOL TARTRATE 1 MG/ML
5-15 INJECTION, SOLUTION INTRAVENOUS
Status: DISCONTINUED | OUTPATIENT
Start: 2024-07-17 | End: 2024-07-18 | Stop reason: HOSPADM

## 2024-07-17 RX ADMIN — METOPROLOL TARTRATE 10 MG: 5 INJECTION INTRAVENOUS at 09:19

## 2024-07-17 RX ADMIN — IVABRADINE 10 MG: 5 TABLET, FILM COATED ORAL at 08:13

## 2024-07-17 RX ADMIN — IOPAMIDOL 110 ML: 755 INJECTION, SOLUTION INTRAVENOUS at 09:56

## 2024-07-17 RX ADMIN — METOPROLOL TARTRATE 50 MG: 50 TABLET, FILM COATED ORAL at 08:13

## 2024-07-17 RX ADMIN — NITROGLYCERIN 0.4 MG: 0.4 TABLET SUBLINGUAL at 09:45

## 2024-07-17 NOTE — TELEPHONE ENCOUNTER
Health Call Center    Phone Message    May a detailed message be left on voicemail: yes     Reason for Call: Other: Melody called, she was able to view her mothers results from her CT angio today. Melody is a PA in there ER and states that the results are concerning and would like to speak to Dr. Copeland's team about this. Please call her back to discuss.      Action Taken: Other: cardiology    Travel Screening: Not Applicable  Thank you!  [facility]       Date of Service:

## 2024-07-17 NOTE — TELEPHONE ENCOUNTER
Tab Copeland MD  7/17/2024  3:46 PM CDT Back to Top      Hi all,     Unfortunately the coronary CTA is suggestive of at least severe LAD disease and possibly also severe LCx disease.  Could you please set her up for coronary angiography on a nonurgent basis with me?  Of course, if she starts to have more worrisome progressive anginal symptoms, standard ER precautions apply.     Thanks!       Spoke with patients daughter and the would like to go ahead with angiogram but they are wondering if it should be done on a more urgent basis as patient feels her chest tightness is coming on quicker with exertion. It does resolve with rest still. Pt aware to go to ED if chest pain happens at rest or does not resolve. Will route to Dr. Copeland to see if this should be an urgent angiogram.

## 2024-07-17 NOTE — TELEPHONE ENCOUNTER
Per message from Dr. Copeland:     Tab Copeland MD  [facility] Heart Team 4  Given high risk CCTA results and accelerating symptoms, I agree that coronary angiography is urgent and should be scheduled without delaying for insurance prior authorization.      Could you please save the above message to the chart and schedule her urgently instead?    Thanks    Order placed for case request tier 1 and high priority message sent to scheduling. Called back to pt's daughter and advised will have scheduling call to set up coronary angiogram, preferably with Dr. Copeland as pt will need to review risks and benefits. Melody verbalized understanding and agreement with plan.

## 2024-07-17 NOTE — TELEPHONE ENCOUNTER
Patient had coronary CTA done today 7/17/24                                                                    IMPRESSION:     1. Total Agatston score 499, placing the patient in the 90th  percentile when compared to age and gender matched control group.     2. Subtotal occlusion of the mid left anterior descending artery,  moderate to severe proximal circumflex stenosis.      3.  Please review Radiology report for incidental noncardiac findings  that  will follow separately.      Patient is having chest tightness and they are worried about these results. The patient and her daughter are wondering next steps. Will route to Dr. Copeland to review.

## 2024-07-18 ENCOUNTER — TELEPHONE (OUTPATIENT)
Dept: CARDIOLOGY | Facility: CLINIC | Age: 74
End: 2024-07-18
Payer: COMMERCIAL

## 2024-07-18 ENCOUNTER — HOSPITAL ENCOUNTER (OUTPATIENT)
Facility: CLINIC | Age: 74
End: 2024-07-18
Attending: INTERNAL MEDICINE | Admitting: INTERNAL MEDICINE
Payer: COMMERCIAL

## 2024-07-18 DIAGNOSIS — I25.119 CORONARY ARTERY DISEASE INVOLVING NATIVE CORONARY ARTERY OF NATIVE HEART WITH ANGINA PECTORIS (H): ICD-10-CM

## 2024-07-18 DIAGNOSIS — R93.1 ABNORMAL FINDINGS ON DIAGNOSTIC IMAGING OF HEART AND CORONARY CIRCULATION: ICD-10-CM

## 2024-07-18 NOTE — TELEPHONE ENCOUNTER
[facility] Call Center    Phone Message    May a detailed message be left on voicemail: yes     Reason for Call: Other: Pt is calling to inform that insurance requires prior auth for scheduled procedure.     Action Taken: Other: cardio    Travel Screening: Not Applicable    Thank you!  [facility]       Date of Service:

## 2024-07-18 NOTE — TELEPHONE ENCOUNTER
Called back and spoke with pt. Advised our financial securing department works on obtaining prior authorization and per review of chart this has been submitted. Advised will let pt know if any updates. Reminder sent to Team 4 board to check status on Monday, 7/22/24. Angiogram is scheduled on 7/25/24.

## 2024-07-23 ENCOUNTER — TELEPHONE (OUTPATIENT)
Dept: CARDIOLOGY | Facility: CLINIC | Age: 74
End: 2024-07-23
Payer: COMMERCIAL

## 2024-07-23 DIAGNOSIS — I25.119 CORONARY ARTERY DISEASE INVOLVING NATIVE CORONARY ARTERY OF NATIVE HEART WITH ANGINA PECTORIS (H): Primary | ICD-10-CM

## 2024-07-23 RX ORDER — METOPROLOL SUCCINATE 25 MG/1
25 TABLET, EXTENDED RELEASE ORAL 2 TIMES DAILY
Qty: 180 TABLET | Refills: 3 | Status: SHIPPED | OUTPATIENT
Start: 2024-07-23 | End: 2024-08-06

## 2024-07-23 NOTE — TELEPHONE ENCOUNTER
Called pt to review that coronary angiogram scheduled on 7/25/24 has been denied by insurance. Per message from Dr. Copeland:     Tab Copeland MD  [facility] Heart Team 4; More, Danika Delarosa PA-C  Unfortunately [id number] was denied today for coronary angiogram.  I think the reasoning is terrible, but that is apparently the [employer] policy.  Apparently she needs to be tried on anti-anginal therapy first and fail before we do an angiogram.  So let's have her start Toprol XL 25 mg BID, and she can follow-up with Danika as scheduled later this month to discuss how she is feeling.   If she ultimately does fail anti-anginal therapy (or if accelerating symptoms concerning for unstable angina), we will order the angiogram again.    Thanks,  Hussein    Pt verbalized understanding and noted she is disappointed that insurance denied her procedure. Reviewed plan to start metoprolol succinate 25 mg BID and for follow up scheduled on 7/30/24 with DANIEL Garnica to discuss symptoms. Prescription sent to pharmacy. Reviewed ER precautions if worsening chest pain or chest pain unrelieved by rest. Message sent to scheduling to cancel angiogram.

## 2024-07-25 ENCOUNTER — TELEPHONE (OUTPATIENT)
Dept: CARDIOLOGY | Facility: CLINIC | Age: 74
End: 2024-07-25
Payer: COMMERCIAL

## 2024-07-25 NOTE — TELEPHONE ENCOUNTER
RN returned patient's call and confirmed for her the rationale for the denial of the coronary angiogram. Patient verbalized understanding and has no further questions at this time.

## 2024-07-25 NOTE — TELEPHONE ENCOUNTER
Health Call Center    Phone Message    May a detailed message be left on voicemail: yes     Reason for Call: Other: Patient called requesting to speak with a member of her care team in regards to the CTA that was denied by her insurance. Please call back to further discuss.     Action Taken: Message routed to:  Other: Cardiology    Travel Screening: Not Applicable     Thank you!  [facility]

## 2024-07-29 ENCOUNTER — PATIENT OUTREACH (OUTPATIENT)
Dept: CARE COORDINATION | Facility: CLINIC | Age: 74
End: 2024-07-29
Payer: COMMERCIAL

## 2024-07-29 ENCOUNTER — MYC REFILL (OUTPATIENT)
Dept: FAMILY MEDICINE | Facility: CLINIC | Age: 74
End: 2024-07-29
Payer: COMMERCIAL

## 2024-07-29 ENCOUNTER — TELEPHONE (OUTPATIENT)
Dept: CARDIOLOGY | Facility: CLINIC | Age: 74
End: 2024-07-29
Payer: COMMERCIAL

## 2024-07-29 DIAGNOSIS — I10 ESSENTIAL HYPERTENSION: ICD-10-CM

## 2024-07-29 RX ORDER — LISINOPRIL/HYDROCHLOROTHIAZIDE 10-12.5 MG
1 TABLET ORAL DAILY
Qty: 90 TABLET | Refills: 2 | Status: SHIPPED | OUTPATIENT
Start: 2024-07-29

## 2024-07-29 NOTE — TELEPHONE ENCOUNTER
[facility] Call Center    Phone Message    May a detailed message be left on voicemail: yes     Reason for Call: Symptoms or Concerns     If patient has red-flag symptoms, warm transfer to triage line    Current symptom or concern: Pt experiencing tightness in chest over the weekend (not currently). Pt concerned it may be from medication but would like to discuss further with care team as soon as possible incase it happens again or gets worse.     Symptoms have been present for:  2 day(s)    Has patient previously been seen for this? No    By : NA    Date: NA    Are there any new or worsening symptoms? Yes: New    Action Taken: Message routed to:  Other: [facility]    Travel Screening: Not Applicable     Date of Service:

## 2024-07-29 NOTE — TELEPHONE ENCOUNTER
Spoke with patient and she stated that she has now started experiencing chest pain at rest. It happened once on Friday night and was taking deep breaths to try to calm herself down and ended up falling asleep. Has not had the chest pain happen again.  Denies chest pain right now. Pt has been taking it very easy though as she is very nervous and states she is not feeling any better with the addition of metoprolol. Pt stated she spoke with her insurance provider after Dr. Brothers peer to peer which the angiogram was still denied and the insurance company was going to send of a fax asking for more records but it is currently denied and may need a new case opened if there is new data supporting need for angiogram. Fax has not been received. Will watch for fax. Pt has OV with Danika More tomorrow but is worried about if she gets chest pain again. Recommended patient go to ER if she develops any chest pain, new SOB, new dizziness or anything else of concern. Pt agreeable.

## 2024-07-30 ENCOUNTER — OFFICE VISIT (OUTPATIENT)
Dept: CARDIOLOGY | Facility: CLINIC | Age: 74
End: 2024-07-30
Payer: COMMERCIAL

## 2024-07-30 ENCOUNTER — HOSPITAL ENCOUNTER (INPATIENT)
Facility: CLINIC | Age: 74
LOS: 2 days | Discharge: HOME | DRG: 322 | End: 2024-08-01
Attending: EMERGENCY MEDICINE | Admitting: HOSPITALIST
Payer: COMMERCIAL

## 2024-07-30 VITALS
HEIGHT: 64 IN | BODY MASS INDEX: 23.56 KG/M2 | WEIGHT: 138 LBS | HEART RATE: 63 BPM | DIASTOLIC BLOOD PRESSURE: 72 MMHG | SYSTOLIC BLOOD PRESSURE: 129 MMHG

## 2024-07-30 DIAGNOSIS — R93.1 ABNORMAL FINDINGS ON DIAGNOSTIC IMAGING OF HEART AND CORONARY CIRCULATION: ICD-10-CM

## 2024-07-30 DIAGNOSIS — I20.0 UNSTABLE ANGINA (H): ICD-10-CM

## 2024-07-30 DIAGNOSIS — I25.119 CORONARY ARTERY DISEASE INVOLVING NATIVE CORONARY ARTERY OF NATIVE HEART WITH ANGINA PECTORIS (H): ICD-10-CM

## 2024-07-30 LAB
ANION GAP SERPL CALCULATED.3IONS-SCNC: 8 MMOL/L (ref 7–15)
BASOPHILS # BLD AUTO: 0.1 10E3/UL (ref 0–0.2)
BASOPHILS NFR BLD AUTO: 1 %
BUN SERPL-MCNC: 28.2 MG/DL (ref 8–23)
CALCIUM SERPL-MCNC: 10.4 MG/DL (ref 8.8–10.4)
CHLORIDE SERPL-SCNC: 102 MMOL/L (ref 98–107)
CREAT SERPL-MCNC: 1.09 MG/DL (ref 0.51–0.95)
EGFRCR SERPLBLD CKD-EPI 2021: 54 ML/MIN/1.73M2
EOSINOPHIL # BLD AUTO: 0.1 10E3/UL (ref 0–0.7)
EOSINOPHIL NFR BLD AUTO: 2 %
ERYTHROCYTE [DISTWIDTH] IN BLOOD BY AUTOMATED COUNT: 14.1 % (ref 10–15)
GLUCOSE SERPL-MCNC: 101 MG/DL (ref 70–99)
HBA1C MFR BLD: 5.6 %
HCO3 SERPL-SCNC: 30 MMOL/L (ref 22–29)
HCT VFR BLD AUTO: 39.8 % (ref 35–47)
HGB BLD-MCNC: 13.2 G/DL (ref 11.7–15.7)
IMM GRANULOCYTES # BLD: 0 10E3/UL
IMM GRANULOCYTES NFR BLD: 0 %
LYMPHOCYTES # BLD AUTO: 1.8 10E3/UL (ref 0.8–5.3)
LYMPHOCYTES NFR BLD AUTO: 32 %
MCH RBC QN AUTO: 28.2 PG (ref 26.5–33)
MCHC RBC AUTO-ENTMCNC: 33.2 G/DL (ref 31.5–36.5)
MCV RBC AUTO: 85 FL (ref 78–100)
MONOCYTES # BLD AUTO: 0.5 10E3/UL (ref 0–1.3)
MONOCYTES NFR BLD AUTO: 9 %
NEUTROPHILS # BLD AUTO: 3.1 10E3/UL (ref 1.6–8.3)
NEUTROPHILS NFR BLD AUTO: 56 %
NRBC # BLD AUTO: 0 10E3/UL
NRBC BLD AUTO-RTO: 0 /100
PLATELET # BLD AUTO: 271 10E3/UL (ref 150–450)
POTASSIUM SERPL-SCNC: 4.6 MMOL/L (ref 3.4–5.3)
RBC # BLD AUTO: 4.68 10E6/UL (ref 3.8–5.2)
SODIUM SERPL-SCNC: 140 MMOL/L (ref 135–145)
TROPONIN T SERPL HS-MCNC: 6 NG/L
WBC # BLD AUTO: 5.6 10E3/UL (ref 4–11)

## 2024-07-30 PROCEDURE — 93005 ELECTROCARDIOGRAM TRACING: CPT

## 2024-07-30 PROCEDURE — 93000 ELECTROCARDIOGRAM COMPLETE: CPT | Performed by: PHYSICIAN ASSISTANT

## 2024-07-30 PROCEDURE — 85025 COMPLETE CBC W/AUTO DIFF WBC: CPT | Performed by: EMERGENCY MEDICINE

## 2024-07-30 PROCEDURE — 99285 EMERGENCY DEPT VISIT HI MDM: CPT | Mod: 25

## 2024-07-30 PROCEDURE — G0378 HOSPITAL OBSERVATION PER HR: HCPCS

## 2024-07-30 PROCEDURE — 83036 HEMOGLOBIN GLYCOSYLATED A1C: CPT | Performed by: HOSPITALIST

## 2024-07-30 PROCEDURE — 120N000001 HC R&B MED SURG/OB

## 2024-07-30 PROCEDURE — 84484 ASSAY OF TROPONIN QUANT: CPT | Performed by: EMERGENCY MEDICINE

## 2024-07-30 PROCEDURE — 258N000003 HC RX IP 258 OP 636: Performed by: EMERGENCY MEDICINE

## 2024-07-30 PROCEDURE — 99222 1ST HOSP IP/OBS MODERATE 55: CPT | Performed by: HOSPITALIST

## 2024-07-30 PROCEDURE — 36415 COLL VENOUS BLD VENIPUNCTURE: CPT | Performed by: EMERGENCY MEDICINE

## 2024-07-30 PROCEDURE — 250N000013 HC RX MED GY IP 250 OP 250 PS 637: Performed by: HOSPITALIST

## 2024-07-30 PROCEDURE — 99215 OFFICE O/P EST HI 40 MIN: CPT | Performed by: PHYSICIAN ASSISTANT

## 2024-07-30 PROCEDURE — 82565 ASSAY OF CREATININE: CPT | Performed by: EMERGENCY MEDICINE

## 2024-07-30 RX ORDER — LIDOCAINE 40 MG/G
CREAM TOPICAL
Status: DISCONTINUED | OUTPATIENT
Start: 2024-07-30 | End: 2024-08-01 | Stop reason: HOSPADM

## 2024-07-30 RX ORDER — MAGNESIUM HYDROXIDE/ALUMINUM HYDROXICE/SIMETHICONE 120; 1200; 1200 MG/30ML; MG/30ML; MG/30ML
30 SUSPENSION ORAL EVERY 4 HOURS PRN
Status: DISCONTINUED | OUTPATIENT
Start: 2024-07-30 | End: 2024-08-01 | Stop reason: HOSPADM

## 2024-07-30 RX ORDER — BIOTIN 800 MCG
1 TABLET ORAL DAILY
COMMUNITY

## 2024-07-30 RX ORDER — METOPROLOL SUCCINATE 25 MG/1
25 TABLET, EXTENDED RELEASE ORAL 2 TIMES DAILY
Status: DISCONTINUED | OUTPATIENT
Start: 2024-07-30 | End: 2024-08-01 | Stop reason: HOSPADM

## 2024-07-30 RX ORDER — ACETAMINOPHEN 650 MG/1
650 SUPPOSITORY RECTAL EVERY 4 HOURS PRN
Status: DISCONTINUED | OUTPATIENT
Start: 2024-07-30 | End: 2024-08-01 | Stop reason: HOSPADM

## 2024-07-30 RX ORDER — NITROGLYCERIN 0.4 MG/1
0.4 TABLET SUBLINGUAL EVERY 5 MIN PRN
Status: DISCONTINUED | OUTPATIENT
Start: 2024-07-30 | End: 2024-08-01

## 2024-07-30 RX ORDER — ACETAMINOPHEN 325 MG/1
650 TABLET ORAL EVERY 4 HOURS PRN
Status: DISCONTINUED | OUTPATIENT
Start: 2024-07-30 | End: 2024-08-01 | Stop reason: HOSPADM

## 2024-07-30 RX ORDER — PANTOPRAZOLE SODIUM 40 MG/1
40 TABLET, DELAYED RELEASE ORAL DAILY PRN
Status: DISCONTINUED | OUTPATIENT
Start: 2024-07-30 | End: 2024-08-01 | Stop reason: HOSPADM

## 2024-07-30 RX ORDER — ASPIRIN 325 MG
325 TABLET ORAL DAILY
Status: DISCONTINUED | OUTPATIENT
Start: 2024-07-31 | End: 2024-08-01

## 2024-07-30 RX ORDER — HYDRALAZINE HYDROCHLORIDE 20 MG/ML
10 INJECTION INTRAMUSCULAR; INTRAVENOUS EVERY 4 HOURS PRN
Status: DISCONTINUED | OUTPATIENT
Start: 2024-07-30 | End: 2024-08-01 | Stop reason: HOSPADM

## 2024-07-30 RX ORDER — LISINOPRIL/HYDROCHLOROTHIAZIDE 10-12.5 MG
1 TABLET ORAL DAILY
Status: DISCONTINUED | OUTPATIENT
Start: 2024-07-31 | End: 2024-08-01 | Stop reason: HOSPADM

## 2024-07-30 RX ADMIN — METOPROLOL SUCCINATE 25 MG: 25 TABLET, EXTENDED RELEASE ORAL at 21:24

## 2024-07-30 RX ADMIN — SODIUM CHLORIDE 500 ML: 9 INJECTION, SOLUTION INTRAVENOUS at 19:17

## 2024-07-30 ASSESSMENT — ACTIVITIES OF DAILY LIVING (ADL)
ADLS_ACUITY_SCORE: 36
ADLS_ACUITY_SCORE: 35
ADLS_ACUITY_SCORE: 30

## 2024-07-30 NOTE — Clinical Note
The first balloon was inserted into the left anterior descending and middle left anterior descending.Max pressure = 7 jluis. Total duration = 25 seconds.     Max pressure = 14 jluis. Total duration = 25 seconds.    Balloon reinflated a second time: Max pressure = 14 jluis. Total duration = 25 seconds.  Balloon reinflated a third time: Max pressure = 20 jluis. Total duration = 25 seconds.

## 2024-07-30 NOTE — PROGRESS NOTES
Cardiology Clinic Progress Note    Service Date: 2024    Primary Cardiologist: Dr. Copeland      Reason for Visit: CAD     HPI:   Geneva Bond delightful 74-year-old woman with past medical history significant for the followin.  Hypertension, treated  2.  Hyperlipidemia with severe myalgias to atorvastatin  3.  Significant coronary artery disease on CTA coronary dated 2024 with a calcium score of 499 putting her 90th percentile but subtotal occlusion of the mid LAD and moderate severe proximal circumflex stenosis with insurance refusing angiogram and spite of [id number] and the patient requiring trial of antianginals.    It is my pleasure to meet Geneva for the first time today.  Unfortunately she continues to have chest pain that has worsened.  She describes chest discomfort that happens with any exertion and is relieved with rest.  Since getting her CTA done last week she has been minimizing her exertion and not going for walks.  She has now developed chest pain at rest as well she had an episode this weekend there was an episode before she went to bed that lasted about 20 minutes felt like squeezing tightness in her chest.  She drank some water and went to sleep and stated she just see if she woke up or not.  It was gone in the morning.  Coming to clinic she has 3 out of 10 chest pain this eventually resolved by the end of her visit without intervention this is been going on multiple times a day and has worsened in frequency and severity.  It does not radiate.  She denies nausea diaphoresis or shortness of breath with it.  She has been taking the Toprol and started it 1 week ago.  It has not improved things at all and if anything she has a weird feeling in her arms that started about 1 hour afterwards.  She has been dragging her feet on the angiogram as she is concerned about insurance.    Social History:  She is a .  She lives in her own home.  She drinks about  "1 to 2 glass of wine a day, no history of tobacco.  She has her daughter Deborah on the phone who is a PA in the ER and OR in [place].    Physical Exam:  /72   Pulse 63   Ht 1.626 m (5' 4\")   Wt 62.6 kg (138 lb)   LMP  (LMP Unknown)   BMI 23.69 kg/m     Well-developed well-nourished woman in no acute distress.  Normocephalic atraumatic.  Heart is regular rate rhythm without murmur rub or gallop.  Lungs are clear without wheezes rales or rhonchi extremities without peripheral edema.    Data reviewed:  Coronary CT,   EKG today shows sinus rhythm Without ST elevation and T wave inversion and just T wave flattening in 3 and V2.  These are nonspecific changes.    Assessment and Plan:  1.  Unstable angina in this woman who has nearly occluded mid LAD, and moderate severe stenosis of the circumflex by CTA.  She has been started on Toprol as an antianginal and this has not been effective anything her chest discomfort has progressed and is consistent with angina comes on with exertion relieved at rest and is now coming on at rest.  Dr. Copeland and did a peer to peer to get her angiogram approved and this was refused.  At this point it is unsafe for her to remain at home and we discussed sending her to the ER for troponins echocardiogram and angiogram sooner.  She is high risk.  Thankfully EKG did not show STEMI today and chest discomfort had resolved by the end of our clinic visit.  Her daughter kindly requested an update when possible from the ER staff.    2.  Hypertension reasonable control    3.  Hyperlipidemia with intolerance to atorvastatin has been on Tricor could consider Tiny dose of rosuvastatin twice a week while hospitalized.    4.  Insurance concerns with the patient concerned that this will not be covered and that is making her hesitate to go to the ER.  I did some checking with our team and there is a chance this will be covered I would appreciate social work looking into it if possible while " "there.    Thank you for allowing me to participate in this delightful patient's care.  Follow-up will be based on her hospitalization.    Danika Coon PA-C  [facility] Cardiology     This note was written using Dragon voice recognition system, please excuse any misspelled names, or nonsensical words and call with any questions.      65 total minutes was spent today including chart review, precharting, history and exam, post visit documentation, and reviewing studies as outlined above.     Orders this visit:  No orders of the defined types were placed in this encounter.    Orders Placed This Encounter   Medications    vitamin B-Complex     Sig: Take 1 tablet by mouth daily    Calcium Carb-Cholecalciferol (CALCIUM+D3 PO)     There are no discontinued medications.  Encounter Diagnoses   Name Primary?    Coronary artery disease involving native coronary artery of native heart with angina pectoris (H24)     Abnormal findings on diagnostic imaging of heart and coronary circulation        Current Medications:  Current Outpatient Medications   Medication Sig Dispense Refill    aspirin (ASA) 325 MG tablet Take 325 mg by mouth daily      Calcium Carb-Cholecalciferol (CALCIUM+D3 PO)       fenofibrate (TRICOR) 145 MG tablet Take 1 tablet by mouth once daily 90 tablet 2    lisinopril-hydrochlorothiazide (ZESTORETIC) 10-12.5 MG tablet Take 1 tablet by mouth daily 90 tablet 2    metoprolol succinate ER (TOPROL XL) 25 MG 24 hr tablet Take 1 tablet (25 mg) by mouth 2 times daily 180 tablet 3    Multiple Vitamins-Minerals (PRESERVISION AREDS 2+MULTI VIT) CAPS       vitamin B-Complex Take 1 tablet by mouth daily         Allergies Reviewed and Updated:  Allergies   Allergen Reactions    Ibuprofen Hives     \"I have taken Advil twice and both times noticed a rash on my neck.  I have not taken it in many years now.\"    Atorvastatin Muscle Pain (Myalgia)     Couldn't use right arm       Review of Systems:  Skin:        Eyes:     "   ENT:       Respiratory:  Positive for shortness of breath;dyspnea on exertion;dyspnea at rest  Cardiovascular:    Positive for;chest pain;palpitations;fatigue  Gastroenterology:      Genitourinary:       Musculoskeletal:       Neurologic:       Psychiatric:       Heme/Lymph/Imm:       Endocrine:  Negative thyroid disorder;diabetes     Pertinent Past Medical, Surgical and Family History Reviewed and noted above.     CC  Michelle Hogue MD  [address] [place] [id number]

## 2024-07-30 NOTE — Clinical Note
The first balloon was inserted into the left anterior descending and middle left anterior descending.Max pressure = 11 jluis. Total duration = 25 seconds.     Max pressure = 16 jluis. Total duration = 26 seconds.    Balloon reinflated a second time: Max pressure = 16 jluis. Total duration = 26 seconds.  Balloon reinflated a third time: Max pressure = 17 jluis. Total duration = 28 seconds.

## 2024-07-30 NOTE — ED PROVIDER NOTES
"  Emergency Department Note      History of Present Illness     Chief Complaint   Chest Pain      HPI   Geneva Bond is a 74 year old female with history of hypertension and hyperlipidemia who presents to the ED for evaluation of chest pain. The patient reports that she has been dealing with chest pain for months. She states that she used to be able to walk 40+ minutes but now is unable to walk 5-10 minutes without feeling chest tightness. Her pain subsides when at rest and drinking lots of water. Patient notes that she last took aspirin at 3:25 AM last night. Patient also endorses increased anxiety because she was supposed to have an angiogram last Thursday but had issues with insurance. She denies abdominal pain and denies any previous MI.     Independent Historian   None    Record Review  Cardiology 7/30/2024    Past Medical History     Medical History and Problem List   Hypertension  Hyperlipidemia  GERD  Alcohol dependence, daily use  Diverticulitis of large intestine    Medications   Aspirin 325 mg  Tricor  Zestoretic  Toprol  Prilosec    Surgical History   Laparoscopic Cholecystectomy    Physical Exam     Patient Vitals for the past 24 hrs:   BP Temp Temp src Pulse Resp SpO2 Height Weight   07/30/24 1905 (!) 167/96 -- -- 63 21 98 % -- --   07/30/24 1718 (!) 145/68 -- -- -- -- -- -- --   07/30/24 1716 -- 98.8  F (37.1  C) Temporal 55 18 98 % 1.626 m (5' 4\") 62.6 kg (138 lb)     Physical Exam  Eyes:  The pupils are equal and round    Conjunctivae and sclerae are normal  ENT:    The nose is normal    Pinnae are normal  CV:  Regular rate and rhythm     No edema  Resp:  Lungs are clear    Non-labored    No rales    No wheezing   GI:  Abdomen is soft and non-tender, there is no rigidity    No distension    No rebound tenderness   MS:  Normal muscular tone    No asymmetric leg swelling  Skin:  No rash or acute skin lesions noted  Neuro:   Awake, alert.      Speech is normal and fluent.    Face is symmetric. "     Moves all extremities    Diagnostics     Lab Results   Labs Ordered and Resulted from Time of ED Arrival to Time of ED Departure   BASIC METABOLIC PANEL - Abnormal       Result Value    Sodium 140      Potassium 4.6      Chloride 102      Carbon Dioxide (CO2) 30 (*)     Anion Gap 8      Urea Nitrogen 28.2 (*)     Creatinine 1.09 (*)     GFR Estimate 54 (*)     Calcium 10.4      Glucose 101 (*)    TROPONIN T, HIGH SENSITIVITY - Normal    Troponin T, High Sensitivity 6     CBC WITH PLATELETS AND DIFFERENTIAL    WBC Count 5.6      RBC Count 4.68      Hemoglobin 13.2      Hematocrit 39.8      MCV 85      MCH 28.2      MCHC 33.2      RDW 14.1      Platelet Count 271      % Neutrophils 56      % Lymphocytes 32      % Monocytes 9      % Eosinophils 2      % Basophils 1      % Immature Granulocytes 0      NRBCs per 100 WBC 0      Absolute Neutrophils 3.1      Absolute Lymphocytes 1.8      Absolute Monocytes 0.5      Absolute Eosinophils 0.1      Absolute Basophils 0.1      Absolute Immature Granulocytes 0.0      Absolute NRBCs 0.0         Imaging   None    EKG   ECG taken at 17:10, ECG read at 17:20  Sinus bradycardia   Rate 59 bpm. TN interval 148 ms. QRS duration 80 ms. QT/QTc 394/390 ms. P-R-T axes 38 40 33.    Independent Interpretation   None    ED Course      Medications Administered   Medications   sodium chloride 0.9% BOLUS 500 mL (500 mLs Intravenous $New Bag 7/30/24 1917)       Procedures   None     Discussion of Management   Admitting Hospitalist, Dr. Varela    ED Course   ED Course as of 07/30/24 1934 Tue Jul 30, 2024 1756 I obtained history and examined the patient as noted above.        Optional/Additional Documentation  None    Medical Decision Making / Diagnosis     CMS Diagnoses: None    MIPS   None    [facility]   Geneva Bond is a 74 year old female who presents to the ER with intermittent episodes of chest pain.  She was seen in the cardiology clinic due to her repeated symptoms of chest pain.  She  had an abnormal coronary angiogram.  She has not been unable to get an angiogram performed as an outpatient and so she was referred here by cardiology for concerns of unstable angina.  EKG here is shows no ST elevations or depression.  Troponin level was normal.  She not having ongoing chest pains will admit to observation for cardiology evaluation.  Discussed with the hospitalist agreed except the patient under observation status.  She already took 325 mg of aspirin this morning.  Heparin not initiated due to lack of ongoing pain and normal troponin level.    Disposition   The patient was admitted to the hospital.     Diagnosis     ICD-10-CM    1. Unstable angina (H)  I20.0              Scribe Disclosure:  Theo OLIVIER, am serving as a scribe at 6:53 PM on 7/30/2024 to document services personally performed by Tesfaye Maldonado MD based on my observations and the provider's statements to me.        Tesfaye Maldonado MD  07/30/24 [id number]

## 2024-07-30 NOTE — LETTER
2024    JEREMIAH CHAUHAN MD  [address] [place] [id number]    RE: Geneva Bond       Dear Colleague,     I had the pleasure of seeing Geneva Bond in the [facility].    Cardiology Clinic Progress Note    Service Date: 2024    Primary Cardiologist: Dr. Copeland      Reason for Visit: CAD     HPI:   Geneva Bond delightful 74-year-old woman with past medical history significant for the followin.  Hypertension, treated  2.  Hyperlipidemia with severe myalgias to atorvastatin  3.  Significant coronary artery disease on CTA coronary dated 2024 with a calcium score of 499 putting her 90th percentile but subtotal occlusion of the mid LAD and moderate severe proximal circumflex stenosis with insurance refusing angiogram and spite of [id number] and the patient requiring trial of antianginals.    It is my pleasure to meet Geneva for the first time today.  Unfortunately she continues to have chest pain that has worsened.  She describes chest discomfort that happens with any exertion and is relieved with rest.  Since getting her CTA done last week she has been minimizing her exertion and not going for walks.  She has now developed chest pain at rest as well she had an episode this weekend there was an episode before she went to bed that lasted about 20 minutes felt like squeezing tightness in her chest.  She drank some water and went to sleep and stated she just see if she woke up or not.  It was gone in the morning.  Coming to clinic she has 3 out of 10 chest pain this eventually resolved by the end of her visit without intervention this is been going on multiple times a day and has worsened in frequency and severity.  It does not radiate.  She denies nausea diaphoresis or shortness of breath with it.  She has been taking the Toprol and started it 1 week ago.  It has not improved things at all and if anything she has a weird feeling in her arms that  "started about 1 hour afterwards.  She has been dragging her feet on the angiogram as she is concerned about insurance.    Social History:  She is a .  She lives in her own home.  She drinks about 1 to 2 glass of wine a day, no history of tobacco.  She has her daughter Deborah on the phone who is a PA in the ER and OR in [place].    Physical Exam:  /72   Pulse 63   Ht 1.626 m (5' 4\")   Wt 62.6 kg (138 lb)   LMP  (LMP Unknown)   BMI 23.69 kg/m     Well-developed well-nourished woman in no acute distress.  Normocephalic atraumatic.  Heart is regular rate rhythm without murmur rub or gallop.  Lungs are clear without wheezes rales or rhonchi extremities without peripheral edema.    Data reviewed:  Coronary CT,   EKG today shows sinus rhythm Without ST elevation and T wave inversion and just T wave flattening in 3 and V2.  These are nonspecific changes.    Assessment and Plan:  1.  Unstable angina in this woman who has nearly occluded mid LAD, and moderate severe stenosis of the circumflex by CTA.  She has been started on Toprol as an antianginal and this has not been effective anything her chest discomfort has progressed and is consistent with angina comes on with exertion relieved at rest and is now coming on at rest.  Dr. Copeland and did a peer to peer to get her angiogram approved and this was refused.  At this point it is unsafe for her to remain at home and we discussed sending her to the ER for troponins echocardiogram and angiogram sooner.  She is high risk.  Thankfully EKG did not show STEMI today and chest discomfort had resolved by the end of our clinic visit.  Her daughter kindly requested an update when possible from the ER staff.    2.  Hypertension reasonable control    3.  Hyperlipidemia with intolerance to atorvastatin has been on Tricor could consider Tiny dose of rosuvastatin twice a week while hospitalized.    4.  Insurance concerns with the patient concerned that this " "will not be covered and that is making her hesitate to go to the ER.  I did some checking with our team and there is a chance this will be covered I would appreciate social work looking into it if possible while there.    Thank you for allowing me to participate in this delightful patient's care.  Follow-up will be based on her hospitalization.    Danika Coon PA-C  [facility]     This note was written using Dragon voice recognition system, please excuse any misspelled names, or nonsensical words and call with any questions.      65 total minutes was spent today including chart review, precharting, history and exam, post visit documentation, and reviewing studies as outlined above.     Orders this visit:  No orders of the defined types were placed in this encounter.    Orders Placed This Encounter   Medications    vitamin B-Complex     Sig: Take 1 tablet by mouth daily    Calcium Carb-Cholecalciferol (CALCIUM+D3 PO)     There are no discontinued medications.  Encounter Diagnoses   Name Primary?    Coronary artery disease involving native coronary artery of native heart with angina pectoris (H24)     Abnormal findings on diagnostic imaging of heart and coronary circulation        Current Medications:  Current Outpatient Medications   Medication Sig Dispense Refill    aspirin (ASA) 325 MG tablet Take 325 mg by mouth daily      Calcium Carb-Cholecalciferol (CALCIUM+D3 PO)       fenofibrate (TRICOR) 145 MG tablet Take 1 tablet by mouth once daily 90 tablet 2    lisinopril-hydrochlorothiazide (ZESTORETIC) 10-12.5 MG tablet Take 1 tablet by mouth daily 90 tablet 2    metoprolol succinate ER (TOPROL XL) 25 MG 24 hr tablet Take 1 tablet (25 mg) by mouth 2 times daily 180 tablet 3    Multiple Vitamins-Minerals (PRESERVISION AREDS 2+MULTI VIT) CAPS       vitamin B-Complex Take 1 tablet by mouth daily         Allergies Reviewed and Updated:  Allergies   Allergen Reactions    Ibuprofen Hives     \"I have taken " "Advil twice and both times noticed a rash on my neck.  I have not taken it in many years now.\"    Atorvastatin Muscle Pain (Myalgia)     Couldn't use right arm       Review of Systems:  Skin:        Eyes:       ENT:       Respiratory:  Positive for shortness of breath;dyspnea on exertion;dyspnea at rest  Cardiovascular:    Positive for;chest pain;palpitations;fatigue  Gastroenterology:      Genitourinary:       Musculoskeletal:       Neurologic:       Psychiatric:       Heme/Lymph/Imm:       Endocrine:  Negative thyroid disorder;diabetes     Pertinent Past Medical, Surgical and Family History Reviewed and noted above.     CC  Michelle Hogue MD  [address] [place] [address]      Thank you for allowing me to participate in the care of your patient.      Sincerely,     Danika Coon PA-C     [facility]  "

## 2024-07-30 NOTE — Clinical Note
Pt mother calling states her son acne is spreading all over his chest have some concern he is scheduled in February wants to know if he can do a VV instead earlier pls return pt mother back @ [phone] to discuss The first balloon was inserted into the left anterior descending and middle left anterior descending.Max pressure = 19 jluis. Total duration = 25 seconds.

## 2024-07-30 NOTE — Clinical Note
Stent deployed in the middle left anterior descending. Max pressure = 11 jluis. Total duration = 25 seconds. Balloon reinflated a second time: Max pressure = 17 jluis. Total duration = 30 seconds.

## 2024-07-30 NOTE — ED TRIAGE NOTES
Pt states she went to the clinic for follow up post angiogram. Pt states her insurance refused the angiogram. Pt states she was sent to   ED to have angiogram done in ED?

## 2024-07-31 ENCOUNTER — APPOINTMENT (OUTPATIENT)
Dept: CARDIOLOGY | Facility: CLINIC | Age: 74
DRG: 322 | End: 2024-07-31
Attending: HOSPITALIST
Payer: COMMERCIAL

## 2024-07-31 LAB
ACT BLD: 257 SECONDS (ref 74–150)
ACT BLD: 278 SECONDS (ref 74–150)
ACT BLD: 287 SECONDS (ref 74–150)
ANION GAP SERPL CALCULATED.3IONS-SCNC: 7 MMOL/L (ref 7–15)
ATRIAL RATE - MUSE: 59 BPM
BUN SERPL-MCNC: 24 MG/DL (ref 8–23)
CALCIUM SERPL-MCNC: 9.9 MG/DL (ref 8.8–10.4)
CHLORIDE SERPL-SCNC: 105 MMOL/L (ref 98–107)
CHOLEST SERPL-MCNC: 137 MG/DL
CREAT SERPL-MCNC: 0.83 MG/DL (ref 0.51–0.95)
DIASTOLIC BLOOD PRESSURE - MUSE: NORMAL MMHG
EGFRCR SERPLBLD CKD-EPI 2021: 74 ML/MIN/1.73M2
FASTING STATUS PATIENT QL REPORTED: YES
GLUCOSE SERPL-MCNC: 96 MG/DL (ref 70–99)
HCO3 SERPL-SCNC: 28 MMOL/L (ref 22–29)
HDLC SERPL-MCNC: 53 MG/DL
INTERPRETATION ECG - MUSE: NORMAL
LDLC SERPL CALC-MCNC: 75 MG/DL
LVEF ECHO: NORMAL
MAGNESIUM SERPL-MCNC: 2.1 MG/DL (ref 1.7–2.3)
NONHDLC SERPL-MCNC: 84 MG/DL
P AXIS - MUSE: 38 DEGREES
POTASSIUM SERPL-SCNC: 4.4 MMOL/L (ref 3.4–5.3)
PR INTERVAL - MUSE: 148 MS
QRS DURATION - MUSE: 80 MS
QT - MUSE: 394 MS
QTC - MUSE: 390 MS
R AXIS - MUSE: 40 DEGREES
SODIUM SERPL-SCNC: 140 MMOL/L (ref 135–145)
SYSTOLIC BLOOD PRESSURE - MUSE: NORMAL MMHG
T AXIS - MUSE: 33 DEGREES
TRIGL SERPL-MCNC: 44 MG/DL
TROPONIN T SERPL HS-MCNC: 9 NG/L
VENTRICULAR RATE- MUSE: 59 BPM

## 2024-07-31 PROCEDURE — 99223 1ST HOSP IP/OBS HIGH 75: CPT | Mod: 25 | Performed by: INTERNAL MEDICINE

## 2024-07-31 PROCEDURE — 999N000208 ECHOCARDIOGRAM COMPLETE

## 2024-07-31 PROCEDURE — 92921 HC PRQ TRLUML CORONARY ANGIOPLASTY ADDL BRANCH: CPT | Mod: LD | Performed by: INTERNAL MEDICINE

## 2024-07-31 PROCEDURE — 93306 TTE W/DOPPLER COMPLETE: CPT | Mod: 26 | Performed by: INTERNAL MEDICINE

## 2024-07-31 PROCEDURE — B2111ZZ FLUOROSCOPY OF MULTIPLE CORONARY ARTERIES USING LOW OSMOLAR CONTRAST: ICD-10-PCS | Performed by: INTERNAL MEDICINE

## 2024-07-31 PROCEDURE — 85347 COAGULATION TIME ACTIVATED: CPT

## 2024-07-31 PROCEDURE — 84484 ASSAY OF TROPONIN QUANT: CPT | Performed by: NURSE PRACTITIONER

## 2024-07-31 PROCEDURE — 93454 CORONARY ARTERY ANGIO S&I: CPT | Mod: 26 | Performed by: INTERNAL MEDICINE

## 2024-07-31 PROCEDURE — 93010 ELECTROCARDIOGRAM REPORT: CPT | Mod: XU | Performed by: INTERNAL MEDICINE

## 2024-07-31 PROCEDURE — C9600 PERC DRUG-EL COR STENT SING: HCPCS | Mod: LD | Performed by: INTERNAL MEDICINE

## 2024-07-31 PROCEDURE — C1874 STENT, COATED/COV W/DEL SYS: HCPCS | Performed by: INTERNAL MEDICINE

## 2024-07-31 PROCEDURE — G0378 HOSPITAL OBSERVATION PER HR: HCPCS

## 2024-07-31 PROCEDURE — 99152 MOD SED SAME PHYS/QHP 5/>YRS: CPT | Performed by: INTERNAL MEDICINE

## 2024-07-31 PROCEDURE — 250N000013 HC RX MED GY IP 250 OP 250 PS 637: Performed by: HOSPITALIST

## 2024-07-31 PROCEDURE — 83735 ASSAY OF MAGNESIUM: CPT | Performed by: HOSPITALIST

## 2024-07-31 PROCEDURE — 99153 MOD SED SAME PHYS/QHP EA: CPT | Performed by: INTERNAL MEDICINE

## 2024-07-31 PROCEDURE — 92921 PR PRQ TRLUML CORONARY ANGIOPLASTY ADDL BRANCH: CPT | Mod: LD | Performed by: INTERNAL MEDICINE

## 2024-07-31 PROCEDURE — C1753 CATH, INTRAVAS ULTRASOUND: HCPCS | Performed by: INTERNAL MEDICINE

## 2024-07-31 PROCEDURE — 120N000001 HC R&B MED SURG/OB

## 2024-07-31 PROCEDURE — 250N000011 HC RX IP 250 OP 636: Performed by: INTERNAL MEDICINE

## 2024-07-31 PROCEDURE — 92920 PRQ TRLUML C ANGIOP 1ART&/BR: CPT | Performed by: INTERNAL MEDICINE

## 2024-07-31 PROCEDURE — C1760 CLOSURE DEV, VASC: HCPCS | Performed by: INTERNAL MEDICINE

## 2024-07-31 PROCEDURE — 80048 BASIC METABOLIC PNL TOTAL CA: CPT | Performed by: HOSPITALIST

## 2024-07-31 PROCEDURE — 93454 CORONARY ARTERY ANGIO S&I: CPT | Performed by: INTERNAL MEDICINE

## 2024-07-31 PROCEDURE — 92978 ENDOLUMINL IVUS OCT C 1ST: CPT | Mod: 26 | Performed by: INTERNAL MEDICINE

## 2024-07-31 PROCEDURE — 36415 COLL VENOUS BLD VENIPUNCTURE: CPT | Performed by: HOSPITALIST

## 2024-07-31 PROCEDURE — B241ZZ3 ULTRASONOGRAPHY OF MULTIPLE CORONARY ARTERIES, INTRAVASCULAR: ICD-10-PCS | Performed by: INTERNAL MEDICINE

## 2024-07-31 PROCEDURE — 250N000013 HC RX MED GY IP 250 OP 250 PS 637: Performed by: INTERNAL MEDICINE

## 2024-07-31 PROCEDURE — 80061 LIPID PANEL: CPT | Performed by: HOSPITALIST

## 2024-07-31 PROCEDURE — C1725 CATH, TRANSLUMIN NON-LASER: HCPCS | Performed by: INTERNAL MEDICINE

## 2024-07-31 PROCEDURE — 255N000002 HC RX 255 OP 636: Performed by: HOSPITALIST

## 2024-07-31 PROCEDURE — 99233 SBSQ HOSP IP/OBS HIGH 50: CPT | Performed by: INTERNAL MEDICINE

## 2024-07-31 PROCEDURE — C8929 TTE W OR WO FOL WCON,DOPPLER: HCPCS

## 2024-07-31 PROCEDURE — C1769 GUIDE WIRE: HCPCS | Performed by: INTERNAL MEDICINE

## 2024-07-31 PROCEDURE — 258N000003 HC RX IP 258 OP 636: Performed by: INTERNAL MEDICINE

## 2024-07-31 PROCEDURE — 027134Z DILATION OF CORONARY ARTERY, TWO ARTERIES WITH DRUG-ELUTING INTRALUMINAL DEVICE, PERCUTANEOUS APPROACH: ICD-10-PCS | Performed by: INTERNAL MEDICINE

## 2024-07-31 PROCEDURE — 92928 PRQ TCAT PLMT NTRAC ST 1 LES: CPT | Mod: LD | Performed by: INTERNAL MEDICINE

## 2024-07-31 PROCEDURE — 92978 ENDOLUMINL IVUS OCT C 1ST: CPT | Performed by: INTERNAL MEDICINE

## 2024-07-31 PROCEDURE — 272N000001 HC OR GENERAL SUPPLY STERILE: Performed by: INTERNAL MEDICINE

## 2024-07-31 PROCEDURE — 93005 ELECTROCARDIOGRAM TRACING: CPT

## 2024-07-31 PROCEDURE — 250N000009 HC RX 250: Performed by: INTERNAL MEDICINE

## 2024-07-31 PROCEDURE — C1887 CATHETER, GUIDING: HCPCS | Performed by: INTERNAL MEDICINE

## 2024-07-31 DEVICE — STENT CORONARY DES SYNERGY XD MR US 2.75X32MM H7493941832270: Type: IMPLANTABLE DEVICE | Status: FUNCTIONAL

## 2024-07-31 RX ORDER — HEPARIN SODIUM 1000 [USP'U]/ML
INJECTION, SOLUTION INTRAVENOUS; SUBCUTANEOUS
Status: DISCONTINUED | OUTPATIENT
Start: 2024-07-31 | End: 2024-07-31 | Stop reason: HOSPADM

## 2024-07-31 RX ORDER — ASPIRIN 81 MG/1
81 TABLET ORAL DAILY
Qty: 30 TABLET | Refills: 3 | Status: SHIPPED | OUTPATIENT
Start: 2024-08-01 | End: 2024-08-01

## 2024-07-31 RX ORDER — FENTANYL CITRATE 50 UG/ML
INJECTION, SOLUTION INTRAMUSCULAR; INTRAVENOUS
Status: DISCONTINUED | OUTPATIENT
Start: 2024-07-31 | End: 2024-07-31 | Stop reason: HOSPADM

## 2024-07-31 RX ORDER — NITROGLYCERIN 0.4 MG/1
0.4 TABLET SUBLINGUAL EVERY 5 MIN PRN
Status: DISCONTINUED | OUTPATIENT
Start: 2024-07-31 | End: 2024-08-01 | Stop reason: HOSPADM

## 2024-07-31 RX ORDER — LORAZEPAM 2 MG/ML
0.5 INJECTION INTRAMUSCULAR
Status: DISCONTINUED | OUTPATIENT
Start: 2024-07-31 | End: 2024-07-31 | Stop reason: HOSPADM

## 2024-07-31 RX ORDER — IOPAMIDOL 755 MG/ML
INJECTION, SOLUTION INTRAVASCULAR
Status: DISCONTINUED | OUTPATIENT
Start: 2024-07-31 | End: 2024-07-31 | Stop reason: HOSPADM

## 2024-07-31 RX ORDER — FENTANYL CITRATE 50 UG/ML
25 INJECTION, SOLUTION INTRAMUSCULAR; INTRAVENOUS
Status: DISCONTINUED | OUTPATIENT
Start: 2024-07-31 | End: 2024-08-01

## 2024-07-31 RX ORDER — ASPIRIN 81 MG/1
81 TABLET ORAL DAILY
Status: DISCONTINUED | OUTPATIENT
Start: 2024-08-01 | End: 2024-08-01 | Stop reason: HOSPADM

## 2024-07-31 RX ORDER — ONDANSETRON 4 MG/1
4 TABLET, ORALLY DISINTEGRATING ORAL EVERY 6 HOURS PRN
Status: DISCONTINUED | OUTPATIENT
Start: 2024-07-31 | End: 2024-08-01 | Stop reason: HOSPADM

## 2024-07-31 RX ORDER — ONDANSETRON 2 MG/ML
4 INJECTION INTRAMUSCULAR; INTRAVENOUS EVERY 6 HOURS PRN
Status: DISCONTINUED | OUTPATIENT
Start: 2024-07-31 | End: 2024-08-01 | Stop reason: HOSPADM

## 2024-07-31 RX ORDER — ATROPINE SULFATE 0.1 MG/ML
0.5 INJECTION INTRAVENOUS
Status: ACTIVE | OUTPATIENT
Start: 2024-07-31 | End: 2024-08-01

## 2024-07-31 RX ORDER — OXYCODONE HYDROCHLORIDE 5 MG/1
5 TABLET ORAL EVERY 4 HOURS PRN
Status: DISCONTINUED | OUTPATIENT
Start: 2024-07-31 | End: 2024-08-01 | Stop reason: HOSPADM

## 2024-07-31 RX ORDER — LORAZEPAM 0.5 MG/1
0.5 TABLET ORAL
Status: DISCONTINUED | OUTPATIENT
Start: 2024-07-31 | End: 2024-07-31 | Stop reason: HOSPADM

## 2024-07-31 RX ORDER — NALOXONE HYDROCHLORIDE 0.4 MG/ML
0.4 INJECTION, SOLUTION INTRAMUSCULAR; INTRAVENOUS; SUBCUTANEOUS
Status: ACTIVE | OUTPATIENT
Start: 2024-07-31 | End: 2024-08-01

## 2024-07-31 RX ORDER — ASPIRIN 81 MG/1
81 TABLET, CHEWABLE ORAL ONCE
Status: DISCONTINUED | OUTPATIENT
Start: 2024-07-31 | End: 2024-07-31

## 2024-07-31 RX ORDER — ACETAMINOPHEN 325 MG/1
650 TABLET ORAL EVERY 4 HOURS PRN
Status: DISCONTINUED | OUTPATIENT
Start: 2024-07-31 | End: 2024-08-01

## 2024-07-31 RX ORDER — NALOXONE HYDROCHLORIDE 0.4 MG/ML
0.2 INJECTION, SOLUTION INTRAMUSCULAR; INTRAVENOUS; SUBCUTANEOUS
Status: ACTIVE | OUTPATIENT
Start: 2024-07-31 | End: 2024-08-01

## 2024-07-31 RX ORDER — METOPROLOL TARTRATE 1 MG/ML
5 INJECTION, SOLUTION INTRAVENOUS
Status: DISCONTINUED | OUTPATIENT
Start: 2024-07-31 | End: 2024-08-01 | Stop reason: HOSPADM

## 2024-07-31 RX ORDER — FLUMAZENIL 0.1 MG/ML
0.2 INJECTION, SOLUTION INTRAVENOUS
Status: ACTIVE | OUTPATIENT
Start: 2024-07-31 | End: 2024-08-01

## 2024-07-31 RX ORDER — LIDOCAINE 40 MG/G
CREAM TOPICAL
Status: DISCONTINUED | OUTPATIENT
Start: 2024-07-31 | End: 2024-07-31

## 2024-07-31 RX ORDER — HYDRALAZINE HYDROCHLORIDE 20 MG/ML
10 INJECTION INTRAMUSCULAR; INTRAVENOUS EVERY 4 HOURS PRN
Status: DISCONTINUED | OUTPATIENT
Start: 2024-07-31 | End: 2024-08-01

## 2024-07-31 RX ORDER — NITROGLYCERIN 5 MG/ML
VIAL (ML) INTRAVENOUS
Status: DISCONTINUED | OUTPATIENT
Start: 2024-07-31 | End: 2024-07-31 | Stop reason: HOSPADM

## 2024-07-31 RX ORDER — OXYCODONE HYDROCHLORIDE 5 MG/1
10 TABLET ORAL EVERY 4 HOURS PRN
Status: DISCONTINUED | OUTPATIENT
Start: 2024-07-31 | End: 2024-08-01 | Stop reason: HOSPADM

## 2024-07-31 RX ORDER — ROSUVASTATIN CALCIUM 5 MG/1
2.5 TABLET, COATED ORAL EVERY OTHER DAY
Qty: 30 TABLET | Refills: 5 | Status: SHIPPED | OUTPATIENT
Start: 2024-07-31 | End: 2024-08-01

## 2024-07-31 RX ORDER — SODIUM CHLORIDE 9 MG/ML
INJECTION, SOLUTION INTRAVENOUS CONTINUOUS
Status: DISCONTINUED | OUTPATIENT
Start: 2024-07-31 | End: 2024-07-31 | Stop reason: HOSPADM

## 2024-07-31 RX ORDER — NITROGLYCERIN 0.4 MG/1
TABLET SUBLINGUAL
Qty: 25 TABLET | Refills: 3 | Status: SHIPPED | OUTPATIENT
Start: 2024-07-31 | End: 2024-08-01

## 2024-07-31 RX ORDER — POTASSIUM CHLORIDE 1500 MG/1
20 TABLET, EXTENDED RELEASE ORAL
Status: DISCONTINUED | OUTPATIENT
Start: 2024-07-31 | End: 2024-07-31 | Stop reason: HOSPADM

## 2024-07-31 RX ORDER — SODIUM CHLORIDE 9 MG/ML
INJECTION, SOLUTION INTRAVENOUS CONTINUOUS
Status: ACTIVE | OUTPATIENT
Start: 2024-07-31 | End: 2024-07-31

## 2024-07-31 RX ADMIN — SODIUM CHLORIDE: 9 INJECTION, SOLUTION INTRAVENOUS at 14:42

## 2024-07-31 RX ADMIN — ALUMINUM HYDROXIDE, MAGNESIUM HYDROXIDE, AND SIMETHICONE 30 ML: 1200; 120; 1200 SUSPENSION ORAL at 00:42

## 2024-07-31 RX ADMIN — PANTOPRAZOLE SODIUM 40 MG: 40 TABLET, DELAYED RELEASE ORAL at 00:42

## 2024-07-31 RX ADMIN — HUMAN ALBUMIN MICROSPHERES AND PERFLUTREN 9 ML: 10; .22 INJECTION, SOLUTION INTRAVENOUS at 11:16

## 2024-07-31 RX ADMIN — FENOFIBRATE 135 MG: 54 TABLET ORAL at 09:42

## 2024-07-31 RX ADMIN — LISINOPRIL AND HYDROCHLOROTHIAZIDE 1 TABLET: 12.5; 1 TABLET ORAL at 09:41

## 2024-07-31 RX ADMIN — ASPIRIN 325 MG ORAL TABLET 325 MG: 325 PILL ORAL at 08:03

## 2024-07-31 RX ADMIN — METOPROLOL SUCCINATE 25 MG: 25 TABLET, EXTENDED RELEASE ORAL at 08:03

## 2024-07-31 RX ADMIN — METOPROLOL SUCCINATE 25 MG: 25 TABLET, EXTENDED RELEASE ORAL at 20:57

## 2024-07-31 ASSESSMENT — ACTIVITIES OF DAILY LIVING (ADL)
ADLS_ACUITY_SCORE: 30
ADLS_ACUITY_SCORE: 29
ADLS_ACUITY_SCORE: 30
ADLS_ACUITY_SCORE: 29
ADLS_ACUITY_SCORE: 30
ADLS_ACUITY_SCORE: 29
ADLS_ACUITY_SCORE: 30

## 2024-07-31 NOTE — PROGRESS NOTES
RECEIVING UNIT ED HANDOFF REVIEW    ED Nurse Handoff Report was reviewed by: Paul Anderson RN on July 30, 2024 at 8:38 PM

## 2024-07-31 NOTE — PLAN OF CARE
Goal Outcome Evaluation:           Overall Patient Progress: no changeOverall Patient Progress: no change      Observation goals  PRIOR TO DISCHARGE        Comments: List all goals to be met before discharge home:  - Serial troponins and stress test complete: not met  - Seen and cleared by consultant if applicable: Not met; needs cards clearance.   - Adequate pain control on oral analgesia: Met  - Vital signs normal or at patient baseline: Met  - Safe disposition plan has been identified: Met  - Nurse to notify provider when observation goals have been met and patient is ready for discharge.

## 2024-07-31 NOTE — PHARMACY-ADMISSION MEDICATION HISTORY
Pharmacist Admission Medication History    Admission medication history is complete. The information provided in this note is only as accurate as the sources available at the time of the update.    Information Source(s): Patient, Clinic records, and CareEverywhere/SureScripts via in-person    Pertinent Information: None    Changes made to PTA medication list:  Added: Omeprazole, Biotin  Deleted: None  Changed: None    Allergies reviewed with patient and updates made in EHR: yes    Medication History Completed By: Abad Ortiz RPH 7/30/2024 7:14 PM    PTA Med List   Medication Sig Last Dose    aspirin (ASA) 325 MG tablet Take 325 mg by mouth daily 7/30/2024 at am    Biotin 800 MCG TABS Take 1 tablet by mouth daily 7/30/2024 at am    Calcium Carb-Cholecalciferol (CALCIUM+D3 PO)  7/30/2024 at am    fenofibrate (TRICOR) 145 MG tablet Take 1 tablet by mouth once daily 7/30/2024 at am    lisinopril-hydrochlorothiazide (ZESTORETIC) 10-12.5 MG tablet Take 1 tablet by mouth daily 7/30/2024 at am    metoprolol succinate ER (TOPROL XL) 25 MG 24 hr tablet Take 1 tablet (25 mg) by mouth 2 times daily 7/30/2024 at am    Multiple Vitamins-Minerals (PRESERVISION AREDS 2+MULTI VIT) CAPS  7/30/2024 at am    omeprazole (PRILOSEC) 20 MG DR capsule Take 20 mg by mouth daily as needed (heartburn) Past Week    vitamin B-Complex Take 1 tablet by mouth daily 7/29/2024 at am

## 2024-07-31 NOTE — PLAN OF CARE
Goal Outcome Evaluation:      Observation goals  PRIOR TO DISCHARGE        Comments: List all goals to be met before discharge home:  - Serial troponins and stress test complete. NOT MET  - Seen and cleared by consultant if applicable NOT MET  - Adequate pain control on oral analgesia MET  - Vital signs normal or at patient baseline MET  - Safe disposition plan has been identified MET  - Nurse to notify provider when observation goals have been met and patient is ready for discharge.

## 2024-07-31 NOTE — PLAN OF CARE
Goal Outcome Evaluation:      Plan of Care Reviewed With: patient    Overall Patient Progress: no changeOverall Patient Progress: no change    PRIMARY Concern: Unstable angina and chest pain at rest.   SAFETY RISK Concerns (fall risk, behaviors, etc.): n/a      Isolation/Type: n/a  Tests/Procedures for NEXT shift: plans for angio yet this afternoon, scheduled for 1540.   Consults? (Pending/following, signed-off?) Cards following. SW to see.    Where is patient from? (Home, TCU, etc.): Home  Other Important info for NEXT shift: Angio this afternoon. Denies CP this shift.   Anticipated DC date & active delays: TBD    SUMMARY NOTE  Orientation/Cognitive: A/O x4  Observation Goals (Met/ Not Met): Inpt status  Mobility Level/Assist Equipment: Ind in room.   Antibiotics & Plan (IV/po, length of tx left): n/a  Pain Management: Mild headache, declined tylenol. No CP this shift.    Tele/VS/O2: VSS on RA, Tele: SR  ABNL Lab/BG: n/a; trop 6,  echo 60-65%  Diet: NPO @ MN  Bowel/Bladder: Cont  Skin Concerns: WNL  Drains/Devices: PIV SL  Patient Stated Goal for Today: Rest

## 2024-07-31 NOTE — PLAN OF CARE
Goal Outcome Evaluation:  PRIMARY Concern: Unstable angina and chest pain at rest.   SAFETY RISK Concerns (fall risk, behaviors, etc.): n/a      Isolation/Type: n/a  Tests/Procedures for NEXT shift:   Consults? (Pending/following, signed-off?) Cards following, SW seen regarding insurance  Where is patient from? (Home, TCU, etc.): Home  Other Important info for NEXT shift: reported mild chest pressure post procedure that is dissipating, R groin site CDI w/o bleeding. 1x stent placed to LDA  Anticipated DC date & active delays: TBD    SUMMARY NOTE  Orientation/Cognitive: A/O x4  Observation Goals (Met/ Not Met): Inpt status  Mobility Level/Assist Equipment: Ind in room.   Antibiotics & Plan (IV/po, length of tx left): n/a  Pain Management: denies pain. No CP this shift.    Tele/VS/O2: VSS on RA, Tele: SR  ABNL Lab/BG: n/a; trop 6,  echo 60-65%  Diet: regular diet.   Bowel/Bladder: Cont  Skin Concerns: WNL  Drains/Devices: NS running at 75 ml hr. Fluids to stop at 2300 per order.   Patient Stated Goal for Today: Rest

## 2024-07-31 NOTE — PROGRESS NOTES
Admission/Transfer from: ED Admission   2 RN skin assessment completed.     Name of 2nd RN:   Significant findings include: None  WOC Nurse Consult Ordered? No

## 2024-07-31 NOTE — ED NOTES
"[facility]  ED Nurse Handoff Report    ED Chief complaint: Chest Pain      ED Diagnosis:   Final diagnoses:   Unstable angina (H)       Code Status: Not on file    Allergies:   Allergies   Allergen Reactions    Ibuprofen Hives     \"I have taken Advil twice and both times noticed a rash on my neck.  I have not taken it in many years now.\"    Atorvastatin Muscle Pain (Myalgia)     Couldn't use right arm       Patient Story: Pt presents to the ED with ongoing chest pain.  Focused Assessment:  Pt reports she has had chest pain for the last several months, and has had trouble getting an angiogram due to insurance issues.  Pt reports pain with 5-10 minutes of activity.   Currently pain free at rest.  Vital signs unremarkable, pt in NSR on cardiac monitor.    Pt given 500 mls NS.  Plans for admission.  Treatments and/or interventions provided: See above  Patient's response to treatments and/or interventions: See above    To be done/followed up on inpatient unit:  NA    Does this patient have any cognitive concerns?:  None    Activity level - Baseline/Home:  Independent  Activity Level - Current:   Independent    Patient's Preferred language: English   Needed?: No    Isolation: None  Infection: Not Applicable  Patient tested for COVID 19 prior to admission: NO  Bariatric?: No    Vital Signs:   Vitals:    07/30/24 1716 07/30/24 1718 07/30/24 1905   BP:  (!) 145/68 (!) 167/96   Pulse: 55  63   Resp: 18  21   Temp: 98.8  F (37.1  C)     TempSrc: Temporal     SpO2: 98%  98%   Weight: 62.6 kg (138 lb)     Height: 1.626 m (5' 4\")         Cardiac Rhythm:     Was the PSS-3 completed:   Yes  What interventions are required if any?               Family Comments: Daughter at bedside  OBS brochure/video discussed/provided to patient/family: Yes              Name of person given brochure if not patient: NA              Relationship to patient: NA    For the majority of the shift this patient's behavior was " Green.   Behavioral interventions performed were NA.    ED NURSE PHONE NUMBER: *[phone]

## 2024-07-31 NOTE — CONSULTS
[facility]    Cardiology Consultation     Date of Admission:  7/30/2024    Assessment & Plan   Geneva Bond is a 74 year old female who was admitted on 7/30/2024.    Unstable angina  Hypertension  Hyperlipidemia, not on statin due to history of myalgias  Mild acute renal insufficiency, improved with hydration    Discussion  Patient has been having recent onset angina which is now worsening with minimal activity as well as at rest suspicious for unstable angina.  Therefore she needs coronary angiography and revascularization as needed.Risks and benefits of left heart catheterization, aka coronary angiogram were discussed with the patient in detail. 0.1-0.3% (for diagnostic angio) and 1-2% (for PCI)  risk of stroke, MI, death, emergent bypass grafting, risk of contrast induced allergic reaction, renal dysfunction, vascular complications were discussed. Patient understands and wishes to proceed.    Patient agrees to proceed with it.  Will also get an echocardiogram to assess wall motion and ejection fraction.  Coronary CTA results reviewed with her as well as reviewed by myself.  I also reviewed admission EKGs.  It revealed sinus bradycardia without ischemic changes.    Admission labs were also reviewed including troponins which were normal.  Patient has mild acute renal insufficiency, creatinine 1.09 on admission that has since improved.      Plan  Coronary angiography later today  Echocardiogram  Patient will need alternative antilipid therapies as has statin induced myalgias.  Could be a candidate for PCSK9 inhibitors or alternative agents    High complexity     Carlos Alberto Mckeon MD, MD    Primary Care Physician   JEREMIAH CHAUHAN    Reason for Consult   Reason for consult: I was asked by hospitalist to evaluate this patient for unstable angina.    History of Present Illness   I had the pleasure of seeing Geneva Bond in cardiology consultation for worsening chest  discomfort.  Patient has been having episodes of chest discomfort with minimal exertion for last few weeks.  This has been worsening including episodes at rest suspicious for unstable angina.  He had a coronary CTA dated 7/17/2024 which showed a calcium score of 499 and a subtotal occlusion of the mid LAD as well as moderate to severe proximal circumflex stenosis.  Despite worsening symptoms and percent of angina, her insurance company declined angiogram until medical therapy was tried.  Despite being on medications including beta-blockers, aspirin and she continued to have worsening angina including rest angina that prompted admission to the emergency room via the clinic.  Presently she is pain-free but is concerned of having angina with minimal activity.    Since admission troponins are negative.  She denies any shortness of breath orthopnea or PND.    Patient is on fenofibrate.  Not on statin because of previous myalgias.    Past Medical History   Past Medical History:   Diagnosis Date    Benign essential hypertension     CAD (coronary artery disease)     Hyperlipidemia          Past Surgical History   Past Surgical History:   Procedure Laterality Date    LAPAROSCOPIC CHOLECYSTECTOMY  2013         Prior to Admission Medications   Prior to Admission Medications   Prescriptions Last Dose Informant Patient Reported? Taking?   Biotin 800 MCG TABS 7/30/2024 at am  Yes Yes   Sig: Take 1 tablet by mouth daily   Calcium Carb-Cholecalciferol (CALCIUM+D3 PO) 7/30/2024 at am  Yes Yes   Multiple Vitamins-Minerals (PRESERVISION AREDS 2+MULTI VIT) CAPS 7/30/2024 at am  Yes Yes   aspirin (ASA) 325 MG tablet 7/30/2024 at am  Yes Yes   Sig: Take 325 mg by mouth daily   fenofibrate (TRICOR) 145 MG tablet 7/30/2024 at am  No Yes   Sig: Take 1 tablet by mouth once daily   lisinopril-hydrochlorothiazide (ZESTORETIC) 10-12.5 MG tablet 7/30/2024 at am  No Yes   Sig: Take 1 tablet by mouth daily   metoprolol succinate ER (TOPROL XL) 25  MG 24 hr tablet 7/30/2024 at am  No Yes   Sig: Take 1 tablet (25 mg) by mouth 2 times daily   omeprazole (PRILOSEC) 20 MG DR capsule Past Week  Yes Yes   Sig: Take 20 mg by mouth daily as needed (heartburn)   vitamin B-Complex 7/29/2024 at am  Yes Yes   Sig: Take 1 tablet by mouth daily      Facility-Administered Medications: None     Current Facility-Administered Medications   Medication Dose Route Frequency Provider Last Rate Last Admin    acetaminophen (TYLENOL) tablet 650 mg  650 mg Oral Q4H PRN Henry Varela MD        Or    acetaminophen (TYLENOL) Suppository 650 mg  650 mg Rectal Q4H PRN Henry Varela MD        alum & mag hydroxide-simethicone (MAALOX) suspension 30 mL  30 mL Oral Q4H PRN Henry Varela MD   30 mL at 07/31/24 0042    aspirin (ASA) tablet 325 mg  325 mg Oral Daily Henry Varela MD   325 mg at 07/31/24 0803    fenofibrate half-tab 135 mg  135 mg Oral Daily Henry Varela MD        hydrALAZINE (APRESOLINE) injection 10 mg  10 mg Intravenous Q4H PRN Henry Varela MD        lidocaine (LMX4) cream   Topical Q1H PRN Henry Varela MD        lidocaine 1 % 0.1-1 mL  0.1-1 mL Other Q1H PRN Henry Varela MD        lisinopril-hydrochlorothiazide (ZESTORETIC) 10-12.5 MG per tablet 1 tablet  1 tablet Oral Daily Henry Varela MD        metoprolol succinate ER (TOPROL XL) 24 hr tablet 25 mg  25 mg Oral BID Henry Varela MD   25 mg at 07/31/24 0803    nitroGLYcerin (NITROSTAT) sublingual tablet 0.4 mg  0.4 mg Sublingual Q5 Min PRN Henry Varela MD        pantoprazole (PROTONIX) EC tablet 40 mg  40 mg Oral Daily PRN Henry Varela MD   40 mg at 07/31/24 0042    sodium chloride (PF) 0.9% PF flush 3 mL  3 mL Intracatheter Q8H Henry Varela MD   3 mL at 07/31/24 0804    sodium chloride (PF) 0.9% PF flush 3 mL  3 mL Intracatheter q1 min prn Henry Varela MD      "    Current Facility-Administered Medications   Medication Dose Route Frequency Provider Last Rate Last Admin    acetaminophen (TYLENOL) tablet 650 mg  650 mg Oral Q4H PRN Henry Varela MD        Or    acetaminophen (TYLENOL) Suppository 650 mg  650 mg Rectal Q4H PRN Henry Varela MD        alum & mag hydroxide-simethicone (MAALOX) suspension 30 mL  30 mL Oral Q4H PRN Henry Varela MD   30 mL at 07/31/24 0042    aspirin (ASA) tablet 325 mg  325 mg Oral Daily Henry Varela MD   325 mg at 07/31/24 0803    fenofibrate half-tab 135 mg  135 mg Oral Daily Henry Varela MD        hydrALAZINE (APRESOLINE) injection 10 mg  10 mg Intravenous Q4H PRN Henry Varela MD        lidocaine (LMX4) cream   Topical Q1H PRN Henry Varela MD        lidocaine 1 % 0.1-1 mL  0.1-1 mL Other Q1H PRN Henry Varela MD        lisinopril-hydrochlorothiazide (ZESTORETIC) 10-12.5 MG per tablet 1 tablet  1 tablet Oral Daily Henry Varela MD        metoprolol succinate ER (TOPROL XL) 24 hr tablet 25 mg  25 mg Oral BID Henry Varela MD   25 mg at 07/31/24 0803    nitroGLYcerin (NITROSTAT) sublingual tablet 0.4 mg  0.4 mg Sublingual Q5 Min PRN Henry Varela MD        pantoprazole (PROTONIX) EC tablet 40 mg  40 mg Oral Daily PRN Henry Varela MD   40 mg at 07/31/24 0042    sodium chloride (PF) 0.9% PF flush 3 mL  3 mL Intracatheter Q8H Henry Varela MD   3 mL at 07/31/24 0804    sodium chloride (PF) 0.9% PF flush 3 mL  3 mL Intracatheter q1 min prn Henry Varela MD         Allergies   Allergies   Allergen Reactions    Ibuprofen Hives     \"I have taken Advil twice and both times noticed a rash on my neck.  I have not taken it in many years now.\"    Atorvastatin Muscle Pain (Myalgia)     Couldn't use right arm       Social History    reports that she quit smoking about 39 years ago. Her smoking use " How Severe Is This Condition?: mild "included cigarettes. She started smoking about 54 years ago. She has a 45 pack-year smoking history. She has never used smokeless tobacco. She reports that she does not currently use alcohol after a past usage of about 2.0 standard drinks of alcohol per week.      Family History   I have reviewed this patient's family history and updated it with pertinent information if needed.  Family History   Problem Relation Age of Onset    Cerebrovascular Disease Mother     Cerebrovascular Disease Father     Hypertension Mother     Diabetes Type 2  Mother     Breast Cancer Maternal Aunt 67.00    Uterine Cancer Maternal Aunt     Uterine Cancer Maternal Grandmother           Review of Systems   A comprehensive review of system was performed and is negative other than that noted in the HPI or here.     Physical Exam   Vital Signs with Ranges  Temp:  [97.2  F (36.2  C)-98.8  F (37.1  C)] 97.2  F (36.2  C)  Pulse:  [55-78] 63  Resp:  [14-21] 16  BP: (122-167)/(51-96) 137/73  SpO2:  [95 %-98 %] 96 %  Wt Readings from Last 4 Encounters:   07/30/24 63.7 kg (140 lb 6.9 oz)   07/30/24 62.6 kg (138 lb)   07/01/24 64.9 kg (143 lb)   08/28/23 63.2 kg (139 lb 4 oz)     No intake/output data recorded.      Vitals: /73 (BP Location: Right arm, Patient Position: Semi-Barney's, Cuff Size: Adult Regular)   Pulse 63   Temp 97.2  F (36.2  C) (Tympanic)   Resp 16   Ht 1.626 m (5' 4\")   Wt 63.7 kg (140 lb 6.9 oz)   LMP  (LMP Unknown)   SpO2 96%   BMI 24.11 kg/m      Physical Exam:   General - Alert and oriented to time place and person in no acute distress  Eyes - No scleral icterus  HEENT - Neck supple, moist mucous membranes  Cardiovascular - reg rate and rhythm  Extremities - There is no edema  Respiratory -clear to auscultation  Skin - No pallor or cyanosis  Gastrointestinal - Non tender and non distended without rebound or guarding  Psych - Appropriate affect   Neurological - No gross motor neurological focal deficits    No lab " "results found in last 7 days.    Invalid input(s): \"TROPONINIES\"    Recent Labs   Lab 07/31/24  0622 07/30/24  1759   WBC  --  5.6   HGB  --  13.2   MCV  --  85   PLT  --  271    140   POTASSIUM 4.4 4.6   CHLORIDE 105 102   CO2 28 30*   BUN 24.0* 28.2*   CR 0.83 1.09*   GFRESTIMATED 74 54*   ANIONGAP 7 8   YVETTE 9.9 10.4   GLC 96 101*     Recent Labs   Lab Test 08/28/23  0822 07/25/22  0916   CHOL 180 158   HDL 65 63   * 86   TRIG 44 45     Recent Labs   Lab 07/30/24  1759   WBC 5.6   HGB 13.2   HCT 39.8   MCV 85        No results for input(s): \"PH\", \"PHV\", \"PO2\", \"PO2V\", \"SAT\", \"PCO2\", \"PCO2V\", \"HCO3\", \"HCO3V\" in the last 168 hours.  No results for input(s): \"NTBNPI\", \"NTBNP\" in the last 168 hours.  No results for input(s): \"DD\" in the last 168 hours.  No results for input(s): \"SED\", \"CRP\" in the last 168 hours.  Recent Labs   Lab 07/30/24  1759        No results for input(s): \"TSH\" in the last 168 hours.  No results for input(s): \"COLOR\", \"APPEARANCE\", \"URINEGLC\", \"URINEBILI\", \"URINEKETONE\", \"SG\", \"UBLD\", \"URINEPH\", \"PROTEIN\", \"UROBILINOGEN\", \"NITRITE\", \"LEUKEST\", \"RBCU\", \"WBCU\" in the last 168 hours.    Imaging:  No results found for this or any previous visit (from the past 48 hour(s)).    Echo:  No results found for this or any previous visit (from the past 4320 hour(s)).    Clinically Significant Risk Factors Present on Admission                # Drug Induced Platelet Defect: home medication list includes an antiplatelet medication   # Hypertension: Noted on problem list                   Cardiac Arrhythmia: Bradycardia, unspecified    Acute kidney failure, unspecified                          "

## 2024-07-31 NOTE — ED NOTES
Pt told she was coming in under observation status and provided with observation informational sheet.

## 2024-07-31 NOTE — PROGRESS NOTES
[facility]    Medicine Progress Note - Hospitalist Service    Date of Admission:  7/30/2024    Assessment & Plan     Geneva Bond is a 74-year-old female with hypertension, hyperlipidemia, intolerant of statins, recent abnormal coronary CTA, who presented from cardiology clinic due to worsening unstable angina and chest pain at rest.    Unstable angina  Coronary artery disease - subtotal occlusion of mLAD, moderate-severe proximal circumflex stenosis, on CTA  -Developed exertional chest pain in 4/2024.  -Coronary CTA 7/17 - showed calcium score 499/90th percentile, subtotal occlusion of mLAD, moderate to severe proximal circumflex stenosis  -Outpatient angiogram was recommended but apparently denied by patient's insurance  -Now presented with chest pain at rest.  Seen in cardiology clinic on 7/30 and subsequently sent to hospital for admission.  -EKG showed sinus rhythm, no acute ischemic changes.  Troponin normal at 6  -Cardiology consulted.  Planning angiogram today  -Obtain echo  -Continue aspirin, Toprol-XL, fenofibrate  -As needed sublingual nitroglycerin    Benign essential hypertension  -Patient was hypertensive on arrival to ER, blood pressure 167/96  -Continue Toprol-XL, lisinopril-hydrochlorothiazide  -As needed hydralazine available    Hyperlipidemia,  8/2023  Intolerance to statins due to severe myalgias with atorvastatin  -Patient hesitant to try any statin due to previous myalgias with atorvastatin.  Currently on file.  -Cardiology planning to consider PCSK9 inhibitors  -Lipid panel pending    Previous tobacco use disorder  -Quit 40 years ago    Mildly elevated creatinine-does not meet criteria for CRISTIAN  -Baseline creatinine 0.7-0.8.  Creatinine 1.09 on admission.  Received IV fluid bolus.  Creatinine improved back to baseline of 0.83    Pulmonary nodule 6 mm  -Noted on CTA.  Needs follow-up CT in 3-6 months    Insurance concerns  -/case management  consulted         Observation Goals: List all goals to be met before discharge home: , - Serial troponins and stress test complete., - Seen and cleared by consultant if applicable, - Adequate pain control on oral analgesia, - Vital signs normal or at patient baseline, - Safe disposition plan has been identified, - Nurse to notify provider when observation goals have been met and patient is ready for discharge.  Diet: NPO per Anesthesia Guidelines for Procedure/Surgery Except for: Meds  NPO for Medical/Clinical Reasons Except for: Meds    DVT Prophylaxis: Pneumatic Compression Devices  [place] Catheter: Not present  Lines: None     Cardiac Monitoring: ACTIVE order. Indication: Chest pain/ ACS rule out (24 hours)  Code Status: Full Code      Clinically Significant Risk Factors Present on Admission                # Drug Induced Platelet Defect: home medication list includes an antiplatelet medication   # Hypertension: Noted on problem list                    Disposition Plan         Medically Ready for Discharge: Anticipated Tomorrow             Nasreen Farias MD  Hospitalist Service  [facility]  Securely message with [employer] (more info)  Text page via [employer] Paging/Directory   ______________________________________________________________________    Interval History     Patient resting in bed.  Undergoing echo.  Denies any chest pain or shortness of breath.  Awaiting angiogram.    Physical Exam   Vital Signs: Temp: 97.2  F (36.2  C) Temp src: Tympanic BP: 137/73 Pulse: 63   Resp: 16 SpO2: 96 % O2 Device: None (Room air)    Weight: 140 lbs 6.93 oz    Constitutional - awake and alert, resting in bed, in no acute distress  Neurological - awake, alert and oriented x3.  Moving all 4 extremities, normal speech, no focal deficits    Medical Decision Making       50 MINUTES SPENT BY ME on the date of service doing chart review, history, exam, documentation & further activities per the note.      Data      I have personally reviewed the following data over the past 24 hrs:    5.6  \   13.2   / 271     140 105 24.0 (H) /  96   4.4 28 0.83 \     Trop: 6 BNP: N/A     TSH: N/A T4: N/A A1C: 5.6       Imaging results reviewed over the past 24 hrs:   No results found for this or any previous visit (from the past 24 hour(s)).

## 2024-07-31 NOTE — H&P
[facility]    History and Physical - Hospitalist Service       Date of Admission:  7/30/2024    Assessment & Plan      Geneva Bond is a 74 year old female with a history of hypertension and hyperlipidemia who was sent from cardiology clinic to the ER due to worsening unstable angina and development of chest pain at rest.  She was chest pain free while in the ER.  Labs were fairly unremarkable including a normal troponin.  The hospitalist service was contacted to bring him to the hospital for further evaluation and management.    Unstable angina  Developed exertional chest pain in April 2024.  Seen in cardiology clinic on July 1, 2024.  Coronary CTA subsequently obtained; calcium score 499 which placed her in the 90th percentile when compared to age and gender matched control group, subtotal occlusion of the mid LAD, moderate to severe proximal circumflex stenosis.  Outpatient angiogram was recommended but apparently was denied by her insurance.  Now having chest pain coming on at rest.  Evaluated in cardiology clinic on 7/30/2024, directed to the ER for expedited workup and hospital admission.  EKG on 7/30/2024 shows sinus rhythm, no acute ischemic changes.  Troponin 6.  [place]ight to observation.  Cardiology consulted, appreciate their assistance.  Telemetry.  Transthoracic echocardiogram.  Cardiac diet for now, n.p.o. after midnight.  Continue PTA aspirin, Toprol-XL, and fenofibrate.  History of severe myalgias with atorvastatin, patient hesitant to start a different statin as noted below.  As needed sublingual nitroglycerin available for chest pain.  Currently chest pain-free and troponin is within normal limits, will hold off on IV heparin for now, would consider if she has recurrent pain.  Check fasting lipid panel and hemoglobin A1c.  Anticipate coronary angiogram tomorrow.    Hypertension  Blood pressure trended up in the ER, currently 167/96.  Continue PTA Toprol-XL and  lisinopril-hydrochlorothiazide.  As needed hydralazine available for significantly elevated blood pressures.    Hyperlipidemia  Continue PTA fenofibrate.  History of severe myalgias with atorvastatin.  Hesitant to try different statin.  Discussed cardiology recommendation to potentially consider low-dose rosuvastatin twice weekly, however patient would like to discuss this further with cardiology.  Check fasting lipid panel in AM.    Elevated creatinine  Creatinine 1.09 in the ER on 7/30/2024.  Prior baseline is around 0.7-0.8.  She received a 500 mL bolus of normal saline while in the ER.  Recheck labs in AM.    History of tobacco use  Quit smoking approximately 40 years ago.    Pulmonary nodules  Coronary CTA also incidentally showed multiple scattered pulmonary nodules, largest measuring 6 x 6 mm.  Recommend follow-up CT in 3 to 6 months.    Insurance concerns  Consult care management/social work, appreciate their assistance.          Diet:  cardiac diet for now, NPO after midnight  DVT Prophylaxis: Low Risk/Ambulatory with no VTE prophylaxis indicated  [place]kee Catheter: Not present  Lines: None     Cardiac Monitoring: None  Code Status:  FULL CODE    Clinically Significant Risk Factors Present on Admission                # Drug Induced Platelet Defect: home medication list includes an antiplatelet medication   # Hypertension: Noted on problem list                    Disposition Plan     Medically Ready for Discharge: Anticipated in 2-4 Days           Henry Varela MD  Hospitalist Service  [facility]  Securely message with [employer] (more info)  Text page via [employer] Paging/Directory     ______________________________________________________________________    Chief Complaint   Unstable angina    History is obtained from the patient    History of Present Illness   Geneva Bond is a 74 year old female with a history of hypertension and hyperlipidemia who was sent from cardiology clinic  to the ER due to worsening unstable angina and development of chest pain at rest.  She initially developed exertional chest tightness in April.  This persisted over the next few months and she was seen in cardiology clinic in early July 2024.  Coronary CTA was ordered and she had a calcium score of 499, subtotal occlusion of the mid LAD and moderate to severe proximal circumflex stenosis.  She was continued on aspirin and started on Toprol without improvement.  Her exertional chest pain persists and now is coming on at rest.  She was seen in the cardiology clinic earlier today and had chest pain while in the clinic, eventually resolved with rest.  EKG did not show STEMI.  She was directed to the ER to expedite evaluation/management of her progressive unstable angina.    In the ER she was evaluated by Dr. Maldonado.  She was hypertensive, other vitals okay as documented in epic.  Labs showed a slight bump in her creatinine but were otherwise fairly unremarkable.  Troponin was within normal limits.  The hospitalist service was contacted to bring her into the hospital for further evaluation and management.    Past Medical History    Past Medical History:   Diagnosis Date    Benign essential hypertension     CAD (coronary artery disease)     Hyperlipidemia      Past Surgical History   Past Surgical History:   Procedure Laterality Date    LAPAROSCOPIC CHOLECYSTECTOMY  2013     Prior to Admission Medications   Prior to Admission Medications   Prescriptions Last Dose Informant Patient Reported? Taking?   Biotin 800 MCG TABS 7/30/2024 at am  Yes Yes   Sig: Take 1 tablet by mouth daily   Calcium Carb-Cholecalciferol (CALCIUM+D3 PO) 7/30/2024 at am  Yes Yes   Multiple Vitamins-Minerals (PRESERVISION AREDS 2+MULTI VIT) CAPS 7/30/2024 at am  Yes Yes   aspirin (ASA) 325 MG tablet 7/30/2024 at am  Yes Yes   Sig: Take 325 mg by mouth daily   fenofibrate (TRICOR) 145 MG tablet 7/30/2024 at am  No Yes   Sig: Take 1 tablet by mouth once  "daily   lisinopril-hydrochlorothiazide (ZESTORETIC) 10-12.5 MG tablet 2024 at am  No Yes   Sig: Take 1 tablet by mouth daily   metoprolol succinate ER (TOPROL XL) 25 MG 24 hr tablet 2024 at am  No Yes   Sig: Take 1 tablet (25 mg) by mouth 2 times daily   omeprazole (PRILOSEC) 20 MG DR capsule Past Week  Yes Yes   Sig: Take 20 mg by mouth daily as needed (heartburn)   vitamin B-Complex 2024 at am  Yes Yes   Sig: Take 1 tablet by mouth daily      Facility-Administered Medications: None        Review of Systems    The 10 point Review of Systems is negative other than noted in the HPI or here.    Social History   I have reviewed this patient's social history and updated it with pertinent information if needed.  Social History     Tobacco Use    Smoking status: Former     Current packs/day: 0.00     Average packs/day: 3.0 packs/day for 15.0 years (45.0 ttl pk-yrs)     Types: Cigarettes     Start date:      Quit date:      Years since quittin.6    Smokeless tobacco: Never   Substance Use Topics    Alcohol use: Not Currently     Alcohol/week: 2.0 standard drinks of alcohol     Types: 2 Glasses of wine per week     Family History   I have reviewed this patient's family history and updated it with pertinent information if needed.  Family History   Problem Relation Age of Onset    Cerebrovascular Disease Mother     Cerebrovascular Disease Father     Hypertension Mother     Diabetes Type 2  Mother     Breast Cancer Maternal Aunt 67.00    Uterine Cancer Maternal Aunt     Uterine Cancer Maternal Grandmother      Allergies   Allergies   Allergen Reactions    Ibuprofen Hives     \"I have taken Advil twice and both times noticed a rash on my neck.  I have not taken it in many years now.\"    Atorvastatin Muscle Pain (Myalgia)     Couldn't use right arm        Physical Exam   Vital Signs: Temp: 98.8  F (37.1  C) Temp src: Temporal BP: (!) 167/96 Pulse: 63   Resp: 21 SpO2: 98 % O2 Device: None (Room air)  "   Weight: 138 lbs 0 oz    Constitutional: awake, alert, cooperative, no apparent distress, laying in the ER bed with the head of the bed elevated  Respiratory: no increased work of breathing, clear to auscultation bilaterally, no crackles or wheezing  Cardiovascular: regular rate and rhythm, normal S1 and S2, no murmur noted  GI: normal bowel sounds, soft, non-distended, non-tender  Skin: warm, dry  Musculoskeletal: no lower extremity pitting edema present  Neurologic: awake, alert, oriented, moves all extremities    Medical Decision Making       65 MINUTES SPENT BY ME on the date of service doing chart review, history, exam, documentation & further activities per the note.      Data     I have personally reviewed the following data over the past 24 hrs:    5.6  \   13.2   / 271     140 102 28.2 (H) /  101 (H)   4.6 30 (H) 1.09 (H) \     Trop: 6 BNP: N/A

## 2024-07-31 NOTE — CONSULTS
SW: Consult in for insurance concerns. Spoke with pt, who stated they were concerned about observation status due to not having Medicare part B, but they understand they are now inpatient and this will be covered under part A. Pt stated their Medicare part B is going to be active starting tomorrow and wondered if this can back pay for the ED/obs portion of the stay. Advised pt she should call her insurance to get the best answer. Also provided pt with business office phone number for any questions related to future bills.    JOAQUÍN Liang  Social Work  [facility]

## 2024-07-31 NOTE — PRE-PROCEDURE
GENERAL PRE-PROCEDURE:   Procedure:  Heart catheterization possible intervention  Date/Time:  7/31/2024 5:02 PM    Written consent obtained?: Yes    Risks and benefits: Risks, benefits and alternatives were discussed    Consent given by:  Patient  Patient states understanding of procedure being performed: Yes    Patient's understanding of procedure matches consent: Yes    Procedure consent matches procedure scheduled: Yes    Expected level of sedation:  Moderate  Appropriately NPO:  Yes  Lungs:  Lungs clear with good breath sounds bilaterally  Heart:  Normal heart sounds and rate  History & Physical reviewed:  History and physical reviewed and no updates needed  Statement of review:  I have reviewed the lab findings, diagnostic data, medications, and the plan for sedation  Risk benefit indication for cath poss interv discussed with pt and also on floor before coming to lab  Mi death cva vasc or renal damage possible dapt  emergent surgery  all questions answered and she wishes to proceed

## 2024-07-31 NOTE — PLAN OF CARE
PRIMARY Concern: Unstable angina   SAFETY RISK Concerns (fall risk, behaviors, etc.): n/a      Isolation/Type: n/a  Tests/Procedures for NEXT shift: Echo, AM Labs  Consults? (Pending/following, signed-off?) Cards, SW pending   Where is patient from? (Home, TCU, etc.): Home  Other Important info for NEXT shift:   Anticipated DC date & active delays: TBD  _____________________________________________________________________________  SUMMARY NOTE  Orientation/Cognitive: A/O x4  Observation Goals (Met/ Not Met): Not met  Mobility Level/Assist Equipment: SBA/IND  Antibiotics & Plan (IV/po, length of tx left): n/a  Pain Management: Denies  Tele/VS/O2: VSS on RA, Tele: SR  ABNL Lab/BG: Crt 1.09  Diet: NPO @ [place]  Bowel/Bladder: Cont  Skin Concerns: WNL  Drains/Devices: PIV SL  Patient Stated Goal for Today: Rest     Observation goals  PRIOR TO DISCHARGE        Comments: List all goals to be met before discharge home:  - Serial troponins and stress test complete. NOT MET  - Seen and cleared by consultant if applicable NOT MET  - Adequate pain control on oral analgesia MET  - Vital signs normal or at patient baseline MET  - Safe disposition plan has been identified MET  - Nurse to notify provider when observation goals have been met and patient is ready for discharge.

## 2024-08-01 ENCOUNTER — APPOINTMENT (OUTPATIENT)
Dept: PHYSICAL THERAPY | Facility: CLINIC | Age: 74
DRG: 322 | End: 2024-08-01
Attending: INTERNAL MEDICINE
Payer: COMMERCIAL

## 2024-08-01 VITALS
SYSTOLIC BLOOD PRESSURE: 140 MMHG | HEIGHT: 64 IN | WEIGHT: 137.9 LBS | BODY MASS INDEX: 23.54 KG/M2 | HEART RATE: 66 BPM | RESPIRATION RATE: 16 BRPM | TEMPERATURE: 98 F | DIASTOLIC BLOOD PRESSURE: 63 MMHG | OXYGEN SATURATION: 98 %

## 2024-08-01 LAB
ANION GAP SERPL CALCULATED.3IONS-SCNC: 9 MMOL/L (ref 7–15)
ATRIAL RATE - MUSE: 49 BPM
ATRIAL RATE - MUSE: 53 BPM
BUN SERPL-MCNC: 17.5 MG/DL (ref 8–23)
CALCIUM SERPL-MCNC: 9.4 MG/DL (ref 8.8–10.4)
CHLORIDE SERPL-SCNC: 104 MMOL/L (ref 98–107)
CREAT SERPL-MCNC: 0.68 MG/DL (ref 0.51–0.95)
DIASTOLIC BLOOD PRESSURE - MUSE: NORMAL MMHG
DIASTOLIC BLOOD PRESSURE - MUSE: NORMAL MMHG
EGFRCR SERPLBLD CKD-EPI 2021: >90 ML/MIN/1.73M2
GLUCOSE SERPL-MCNC: 92 MG/DL (ref 70–99)
HCO3 SERPL-SCNC: 26 MMOL/L (ref 22–29)
HOLD SPECIMEN: NORMAL
INTERPRETATION ECG - MUSE: NORMAL
INTERPRETATION ECG - MUSE: NORMAL
P AXIS - MUSE: 54 DEGREES
P AXIS - MUSE: 62 DEGREES
POTASSIUM SERPL-SCNC: 3.8 MMOL/L (ref 3.4–5.3)
PR INTERVAL - MUSE: 160 MS
PR INTERVAL - MUSE: 160 MS
QRS DURATION - MUSE: 80 MS
QRS DURATION - MUSE: 82 MS
QT - MUSE: 428 MS
QT - MUSE: 448 MS
QTC - MUSE: 401 MS
QTC - MUSE: 404 MS
R AXIS - MUSE: 31 DEGREES
R AXIS - MUSE: 45 DEGREES
SODIUM SERPL-SCNC: 139 MMOL/L (ref 135–145)
SYSTOLIC BLOOD PRESSURE - MUSE: NORMAL MMHG
SYSTOLIC BLOOD PRESSURE - MUSE: NORMAL MMHG
T AXIS - MUSE: 23 DEGREES
T AXIS - MUSE: 37 DEGREES
VENTRICULAR RATE- MUSE: 49 BPM
VENTRICULAR RATE- MUSE: 53 BPM

## 2024-08-01 PROCEDURE — 97161 PT EVAL LOW COMPLEX 20 MIN: CPT | Mod: GP

## 2024-08-01 PROCEDURE — 36415 COLL VENOUS BLD VENIPUNCTURE: CPT | Performed by: INTERNAL MEDICINE

## 2024-08-01 PROCEDURE — 97530 THERAPEUTIC ACTIVITIES: CPT | Mod: GP

## 2024-08-01 PROCEDURE — 99239 HOSP IP/OBS DSCHRG MGMT >30: CPT | Performed by: INTERNAL MEDICINE

## 2024-08-01 PROCEDURE — 93010 ELECTROCARDIOGRAM REPORT: CPT | Performed by: INTERNAL MEDICINE

## 2024-08-01 PROCEDURE — 97110 THERAPEUTIC EXERCISES: CPT | Mod: GP

## 2024-08-01 PROCEDURE — 250N000013 HC RX MED GY IP 250 OP 250 PS 637: Performed by: INTERNAL MEDICINE

## 2024-08-01 PROCEDURE — 99232 SBSQ HOSP IP/OBS MODERATE 35: CPT | Performed by: INTERNAL MEDICINE

## 2024-08-01 PROCEDURE — 250N000013 HC RX MED GY IP 250 OP 250 PS 637: Performed by: HOSPITALIST

## 2024-08-01 PROCEDURE — 80048 BASIC METABOLIC PNL TOTAL CA: CPT | Performed by: INTERNAL MEDICINE

## 2024-08-01 PROCEDURE — 93005 ELECTROCARDIOGRAM TRACING: CPT

## 2024-08-01 RX ORDER — ASPIRIN 81 MG/1
81 TABLET ORAL DAILY
Qty: 90 TABLET | Refills: 3 | Status: SHIPPED | OUTPATIENT
Start: 2024-08-01

## 2024-08-01 RX ORDER — NITROGLYCERIN 0.4 MG/1
TABLET SUBLINGUAL
Qty: 25 TABLET | Refills: 3 | Status: SHIPPED | OUTPATIENT
Start: 2024-08-01

## 2024-08-01 RX ADMIN — TICAGRELOR 90 MG: 90 TABLET ORAL at 08:39

## 2024-08-01 RX ADMIN — METOPROLOL SUCCINATE 25 MG: 25 TABLET, EXTENDED RELEASE ORAL at 08:39

## 2024-08-01 RX ADMIN — ASPIRIN 81 MG: 81 TABLET, COATED ORAL at 08:39

## 2024-08-01 RX ADMIN — FENOFIBRATE 135 MG: 54 TABLET ORAL at 08:38

## 2024-08-01 RX ADMIN — LISINOPRIL AND HYDROCHLOROTHIAZIDE 1 TABLET: 12.5; 1 TABLET ORAL at 08:39

## 2024-08-01 ASSESSMENT — ACTIVITIES OF DAILY LIVING (ADL)
ADLS_ACUITY_SCORE: 30
ADLS_ACUITY_SCORE: 18
ADLS_ACUITY_SCORE: 30
ADLS_ACUITY_SCORE: 30
ADLS_ACUITY_SCORE: 18
ADLS_ACUITY_SCORE: 18
ADLS_ACUITY_SCORE: 30
DIFFICULTY_EATING/SWALLOWING: NO
DOING_ERRANDS_INDEPENDENTLY_DIFFICULTY: NO
CHANGE_IN_FUNCTIONAL_STATUS_SINCE_ONSET_OF_CURRENT_ILLNESS/INJURY: NO
ADLS_ACUITY_SCORE: 18
ADLS_ACUITY_SCORE: 30
CONCENTRATING,_REMEMBERING_OR_MAKING_DECISIONS_DIFFICULTY: NO
FALL_HISTORY_WITHIN_LAST_SIX_MONTHS: NO
TOILETING_ISSUES: NO
ADLS_ACUITY_SCORE: 18
WALKING_OR_CLIMBING_STAIRS_DIFFICULTY: NO
DRESSING/BATHING_DIFFICULTY: NO
ADLS_ACUITY_SCORE: 30

## 2024-08-01 NOTE — PLAN OF CARE
Physical Therapy Discharge Summary    Reason for therapy discharge:    Discharged to home with outpatient therapy.    Progress towards therapy goal(s). See goals on Care Plan in [facility] electronic health record for goal details.  Goals partially met.  Barriers to achieving goals:   discharge from facility.    Therapy recommendation(s):    Continued therapy is recommended.  Rationale/Recommendations:  For further cardiac education and endurance training.

## 2024-08-01 NOTE — PLAN OF CARE
PRIMARY Concern: Unstable angina and chest pain at rest.   SAFETY RISK Concerns (fall risk, behaviors, etc.): n/a      Isolation/Type: n/a  Tests/Procedures for NEXT shift: none  Consults? (Pending/following, signed-off?) Cards following. SW following    Where is patient from? (Home, TCU, etc.): Home  Other Important info for NEXT shift: Angio yesterday, off BR @2030, developed hematoma with small amount of bleeding at right groin site. BR overnight, ambulated to BR @0600 site unchanged.  Anticipated DC date & active delays: today    SUMMARY NOTE  Orientation/Cognitive: A/O x4  Observation Goals (Met/ Not Met): Inpt status  Mobility Level/Assist Equipment: bedrest overnight  Antibiotics & Plan (IV/po, length of tx left): n/a  Pain Management: declines pain meds, hot pack on lower back  Tele/VS/O2: VSS on RA, Tele: SB  ABNL Lab/BG: n/a; trop 6,  echo 60-65%  Diet: cardiac  Bowel/Bladder: purewick overnight  Skin Concerns: Right groin stick  Drains/Devices: PIV SL  Patient Stated Goal for Today: Rest

## 2024-08-01 NOTE — PROVIDER NOTIFICATION
MD Notification    Notified Person: MD    Notified Person Name: Dr. Mckeon    Notification Date/Time: 7/31/24 9:38    Notification Interaction: francie    Purpose of Notification: off bedrest, ambulated to BR, pea size hematoma, layed down, rechecked right groin site, hematoma increased, applying pressure    Orders Received:     Comments:

## 2024-08-01 NOTE — PROGRESS NOTES
08/01/24 1100   Appointment Info   Signing Clinician's Name / Credentials (PT) Maritza Mejia PT   Living Environment   People in Home alone   Current Living Arrangements house   Home Accessibility stairs to enter home;stairs within home   Number of Stairs, Main Entrance 1   Stair Railings, Main Entrance railings safe and in good condition;railing on right side (ascending)   Number of Stairs, Within Home, Primary seven   Stair Railings, Within Home, Primary railings safe and in good condition;railing on right side (ascending)   Transportation Anticipated car, drives self   Self-Care   Usual Activity Tolerance good   Current Activity Tolerance good   Regular Exercise No   Equipment Currently Used at Home none   Fall history within last six months no   General Information   Onset of Illness/Injury or Date of Surgery 07/30/24   Referring Physician Dr. Varela   Patient/Family Therapy Goals Statement (PT) To go home   Pertinent History of Current Problem (include personal factors and/or comorbidities that impact the POC) Pt is a 74 year old female admitted with unstable angina, s/p PCI.   Existing Precautions/Restrictions cardiac   Cognition   Affect/Mental Status (Cognition) WFL   Orientation Status (Cognition) oriented x 4   Follows Commands (Cognition) WFL   Pain Assessment   Patient Currently in Pain No   Range of Motion (ROM)   Range of Motion ROM is WFL   Strength (Manual Muscle Testing)   Strength (Manual Muscle Testing) strength is WFL   Bed Mobility   Bed Mobility no deficits identified   Transfers   Transfers no deficits identified   Gait/Stairs (Locomotion)   [place] Level (Gait) independent   Balance   Balance Comments Good   Clinical Impression   Criteria for Skilled Therapeutic Intervention Yes, treatment indicated   PT Diagnosis (PT) Impaired endurance   Influenced by the following impairments Decreased endurance   Functional limitations due to impairments Difficulty with long distance ambulation    Clinical Presentation (PT Evaluation Complexity) stable   Clinical Presentation Rationale VSS, pain controlled   Clinical Decision Making (Complexity) low complexity   Planned Therapy Interventions (PT) patient/family education;progressive activity/exercise;risk factor education;home program guidelines   Risk & Benefits of therapy have been explained evaluation/treatment results reviewed;care plan/treatment goals reviewed;risks/benefits reviewed;current/potential barriers reviewed;participants voiced agreement with care plan;participants included;patient   PT Total Evaluation Time   PT Eval, Low Complexity Minutes ([id number]) 10   PT Discharge Planning   PT Plan Progress activity tolerance, increase ambulation distance, stairs, review education   PT Discharge Recommendation (DC Rec) home with outpatient cardiac rehab   PT Rationale for DC Rec Pt is independent with mobility and safe to discharge home. Pt will benefit from outpatient CR to further increase activity tolerance and for cardiac education.   PT Brief overview of current status Independent   Total Session Time   Total Session Time (sum of timed and untimed services) 10

## 2024-08-01 NOTE — PROGRESS NOTES
"Cardiology Progress Note  Carlos Alberto Mckeon MD         Assessment and Plan:        Unstable angina - S/P complex LAD and dx stenting , on dual antiplatelet therapy  HTN  Hyperlipidemia    Discussion  Patient pain-free.  Tolerated cardiac rehab.  Had slight anxiety and palpitations but telemetry reveals no arrhythmias.  S/p complex LAD and diagonal stenting yesterday.  On dual antiplatelet therapy.    Patient has hyperlipidemia but was not able to tolerate statins in the past.  We offered her low-dose rosuvastatin every other day but she wants to hold off.  She has been taking fenofibrate.  As an outpatient we should discuss rechallenging her with low-dose rosuvastatin or consider PCSK9 inhibitors.  She will discuss that with her primary cardiologist.    For now, she is pain-free and can be discharged later today.  She should be discharged on sublingual nitroglycerin.  Discussed plan with her, her family members as well as the hospitalist.    35 minutes spent in chart review, review of cath films, documentation, discussion with the patient as well as the hospitalist.                     Interval History:     no new complaints          Review of Systems:     The 5 point Review of Systems is negative other than noted in the HPI          Physical Exam:        Blood pressure (!) 140/63, pulse 66, temperature 98  F (36.7  C), temperature source Oral, resp. rate 16, height 1.626 m (5' 4\"), weight 62.6 kg (137 lb 14.4 oz), SpO2 98%.  Vitals:    07/30/24 1716 07/30/24 2115 08/01/24 0858   Weight: 62.6 kg (138 lb) 63.7 kg (140 lb 6.9 oz) 62.6 kg (137 lb 14.4 oz)     Weights since admission  Vitals:    07/30/24 1716 07/30/24 2115 08/01/24 0858   Weight: 62.6 kg (138 lb) 63.7 kg (140 lb 6.9 oz) 62.6 kg (137 lb 14.4 oz)     Vital Signs with Ranges  Temp:  [97.5  F (36.4  C)-98  F (36.7  C)] 98  F (36.7  C)  Pulse:  [56-89] 66  Resp:  [11-18] 16  BP: (117-147)/(52-78) 140/63  SpO2:  [97 %-100 %] 98 %  I/O's Last 24 hours  No " "intake/output data recorded.  Net I/O since admission  No intake/output data recorded.    EXAM:    Constitutional:   in no apparent distress     Lungs:   normal     Cardiovascular:   normal S1 and S2 and no murmur noted     Neurological:   No gross or focal neurologic abnormalities            Medications:        Current Facility-Administered Medications   Medication Dose Route Frequency Provider Last Rate Last Admin    aspirin EC tablet 81 mg  81 mg Oral Daily Hiren Hwang MD   81 mg at 08/01/24 0839    fenofibrate half-tab 135 mg  135 mg Oral Daily Marco Varela MD   135 mg at 08/01/24 0838    lisinopril-hydrochlorothiazide (ZESTORETIC) 10-12.5 MG per tablet 1 tablet  1 tablet Oral Daily Marco Varela MD   1 tablet at 08/01/24 0839    metoprolol succinate ER (TOPROL XL) 24 hr tablet 25 mg  25 mg Oral BID Marco Varela MD   25 mg at 08/01/24 0839    rosuvastatin (CRESTOR) half-tab 2.5 mg  2.5 mg Oral Every Other Day Hiren Hwang MD        sodium chloride (PF) 0.9% PF flush 3 mL  3 mL Intracatheter Q8H Marco Varela MD   3 mL at 08/01/24 0558    ticagrelor (BRILINTA) tablet 90 mg  90 mg Oral BID Hiren Hwang MD   90 mg at 08/01/24 0839            Data:      All new lab and imaging data was reviewed.   Recent Labs   Lab Test 07/30/24  1759 07/25/22  0916 11/02/20  1453   WBC 5.6 4.3 7.4   HGB 13.2 12.6 13.9   MCV 85 85 86    262 279      Recent Labs   Lab Test 08/01/24  0654 07/31/24  0622 07/30/24  1759    140 140   POTASSIUM 3.8 4.4 4.6   CHLORIDE 104 105 102   CO2 26 28 30*   BUN 17.5 24.0* 28.2*   CR 0.68 0.83 1.09*   ANIONGAP 9 7 8   YVETTE 9.4 9.9 10.4   GLC 92 96 101*     No lab results found.    Invalid input(s): \"TROP\", \"TROPONINIES\"           Imaging:   Recent Results (from the past 24 hour(s))   Echocardiogram Complete   Result Value    LVEF  60%    Narrative    [id number]  [id number]  [id number]  [id number]^EKDEB^MARCO^JUAN PABLO Doe " [facility]  Echocardiography Laboratory  [address] [place] [address]     Name: JUNE JI  MRN: [id number]  : 1950  Study Date: 2024 10:50 AM  Age: 74 yrs  Gender: Female  Patient Location: [facility]  Reason For Study: Chest Pain, Chest Pressure, Chest Tightness  Ordering Physician: MARCO FOX  Performed By: Shanon Warner     BSA: 1.7 m2  Height: 64 in  Weight: 138 lb  HR: 64  BP: 137/73 mmHg  ______________________________________________________________________________  Procedure  Complete Portable Echo Adult. Optison (NDC #0372-7209) given intravenously.  Contrast Optison.  ______________________________________________________________________________  Interpretation Summary     1. The visual ejection fraction is estimated at 60-65%. The left ventricle is  normal in structure, function and size. Normal left ventricular wall motion  2. The right ventricle is normal in structure, function and size.  3. No valve disease.     No previous echo for comparison.  ______________________________________________________________________________  Left Ventricle  The left ventricle is normal in structure, function and size. There is normal  left ventricular wall thickness. The visual ejection fraction is estimated at  60%. Left ventricular diastolic function is normal. Normal left ventricular  wall motion.     Right Ventricle  The right ventricle is normal in structure, function and size.     Atria  Normal left atrial size. Right atrial size is normal. There is no atrial shunt  seen.     Mitral Valve  The mitral valve is normal in structure and function.     Tricuspid Valve  No tricuspid regurgitation.     Aortic Valve  The aortic valve is normal in structure and function.     Pulmonic Valve  The pulmonic valve is normal in structure and function.     Vessels  Normal ascending, transverse (arch), and descending aorta. The inferior vena  cava was normal in size with  preserved respiratory variability.     Pericardium  There is no pericardial effusion.     Rhythm  Sinus rhythm was noted.  ______________________________________________________________________________  MMode/2D Measurements & Calculations     IVSd: 1.0 cm  LVIDd: 4.1 cm  LVIDs: 2.3 cm  LVPWd: 0.80 cm  FS: 43.5 %  LV mass(C)d: 114.1 grams  LV mass(C)dI: 68.3 grams/m2  Ao root diam: 2.5 cm  asc Aorta Diam: 3.1 cm  LVOT diam: 1.9 cm  LVOT area: 2.8 cm2  Ao root diam index Ht(cm/m): 1.6  Ao root diam index BSA (cm/m2): 1.5  Asc Ao diam index BSA (cm/m2): 1.9  Asc Ao diam index Ht(cm/m): 1.9  LA Volume (BP): 34.0 ml     LA Volume Index (BP): 20.4 ml/m2  RWT: 0.39  TAPSE: 2.9 cm     Doppler Measurements & Calculations  MV E max willi: 88.0 cm/sec  MV A max willi: 89.6 cm/sec  MV E/A: 0.98  MV dec slope: 390.7 cm/sec2  MV dec time: 0.23 sec  Ao V2 max: 110.0 cm/sec  Ao max P.0 mmHg  Ao V2 mean: 71.3 cm/sec  Ao mean P.0 mmHg  Ao V2 VTI: 24.7 cm  MELLY(I,D): 2.1 cm2  MELLY(V,D): 2.2 cm2  LV V1 max PG: 3.0 mmHg  LV V1 max: 86.0 cm/sec  LV V1 VTI: 18.9 cm  SV(LVOT): 52.7 ml  SI(LVOT): 31.5 ml/m2  PA acc time: 0.12 sec  AV Willi Ratio (DI): 0.78  MELLY Index (cm2/m2): 1.3  E/E' avg: 10.2     Lateral E/e': 9.0  Medial E/e': 11.4  RV S Willi: 15.6 cm/sec     ______________________________________________________________________________  Report approved by: Regine Lozano 2024 01:13 PM         Cardiac Catheterization    Narrative      Ost LAD lesion is 15% stenosed.    Prox LAD lesion is 95% stenosed.    2nd Diag lesion is 99% stenosed.    Complex PCI of LAD/D2  IVUS guided.  The D2 is < 2.0mm  at ostia with the   Lad (DAVILA )  Kissing balloon technic and final POT of prox LAD>   We stented the prox   to mid LAD with 2.75 stent and post dilated, kiss balloon, and final POT   with 3.0 and 3.25 NC balloon. Also dilated side cell of LAD stent into   ostium of D2.  Did not stent the D2 since it was < 2mm and had ALVIN  3   flow. No complications  History of statin intolerance. Will try crestor 2.5mg  QOD

## 2024-08-01 NOTE — PROVIDER NOTIFICATION
MD Notification    Notified Person: MD    Notified Person Name: Dr. Ronquillo    Notification Date/Time: 7/31/24 3369    Notification Interaction: after ambulating to bathroom a pea size hematoma was notice, after recheck hematoma had increased, pressure applied. Small increase in bleeding at site, marked     Purpose of Notification: any further orders    Orders Received: apply pressure for 15 more min. Bedrest overnight    Comments:

## 2024-08-01 NOTE — PLAN OF CARE
Pt A&O. VSS. On RA. Tele NSR. Denies CP, mild back discomfort, improved off bedrest. Voiding. Tolerating diet. R groin unchanged. Small spot of firmness, no extension of bruising/bleeding. CMS intact. Therapy and Cards signed off. Ok for discharge home per providers. All discharge instructions, meds, and follow ups reviewed with patient and sister. IV removed. Escorted to exit via wheelchair by aid.

## 2024-08-01 NOTE — CONSULTS
NUTRITION EDUCATION    REASON FOR ASSESSMENT:  Nutrition education on American Heart Association (AHA) Heart Healthy Diet.    NUTRITION HISTORY:  Information obtained from patient    Met with pt at bedside. Pt reports to already limiting red and processed meats. Writer discussed ways to incorporate more fiber into diet by adding different whole grains and beans/legumes. Writer discussed sodium content in foods other than table salt.     CURRENT DIET ORDER:  Low Saturated fat     NUTRITION DIAGNOSIS:  Food- and nutrition-related knowledge deficit R/t no previous education on incorporating fiber rich food in diet as evidenced by patient report and need for heart health education.      INTERVENTIONS:  Nutrition Prescription:  Recommended AHA Heart Healthy Diet    Implementation:   Nutrition Education (Content):  reviewed Heart Healthy Diet guidelines  provided heart healthy diet handout  Nutrition Education (Application):  Discussed current eating habits and recommended alternative food choices  Anticipate good compliance  Diet Education - refer to Education flowsheet    Goals:  Patient verbalizes understanding of diet by naming whole grains she would like to try   All of the above goals met during education session    Follow Up/Monitoring:  Provided RD contact information for future questions     Melanie Stevens, Dietetic Intern

## 2024-08-01 NOTE — DISCHARGE INSTRUCTIONS
Cardiac Angioplasty/Stent Discharge Instructions - Femoral    After you go home:    Have an adult stay with you until tomorrow.  Drink extra fluids for 2 days.  You may resume your normal diet.  No smoking       For 24 hours - due to the sedation you received:  Relax and take it easy.  Do NOT make any important or legal decisions.  Do NOT drive or operate machines at home or at work.  Do NOT drink alcohol.    Care of Groin Puncture Site:    For the first 24 hrs - check the puncture site every 1-2 hours while awake.  For 2 days, when you cough, sneeze, laugh or move your bowels, hold your hand over the puncture site and press firmly.  Remove the bandaid after 24 hours. If there is minor oozing, apply another bandaid and remove it after 12 hours.  It is normal to have a small bruise or pea size lump at the site.  You may shower tomorrow. Do NOT take a bath, or use a hot tub or pool for at least 3 days. Do NOT scrub the site. Do not use lotion or powder near the puncture site.     Activity:            For 2 days:  No stooping or squatting  Do NOT do any heavy activity such as exercise, lifting, or straining.   No housework, yard work or any activity that make you sweat  Do NOT lift more than 10 pounds    Bleeding:    If you start bleeding from the site in your groin, lie down flat and press firmly on/above the site for 10 minutes.   Once bleeding stops, lay flat for 2 hours.   Call [facility] as soon as you can.       Call 911 right away if you have heavy bleeding or bleeding that does not stop.      Medicines:    If you are taking an antiplatelet medication such as Plavix, Brilinta or Effient, do not stop taking it until you talk to your cardiologist.      If you are on Metformin (Glucophage), do not restart it until you have blood tests (within 2 to 3 days after discharge).  After you have your blood drawn, you may restart the Metformin.   Take your medications, including blood thinners, unless your provider tells  you not to.    If you take Coumadin (Warfarin), have your INR checked by your provider in  3-5 days. Call your clinic to schedule this.  If you have stopped any medicines, check with your provider about when to restart them.    Follow Up Appointments:    Follow up with [facility] Nurse Practitioner at [facility] of patient preference in 7-10 days.  Cardiac Rehab will contact you for follow up care.    Call the clinic if:    You have increased pain or a large or growing hard lump around the site.  The site is red, swollen, hot or tender.  Blood or fluid is draining from the site.  You have chills or a fever greater than 101 F (38 C).  Your leg feels numb, cool or changes color.  You have hives, a rash or unusual itching.  New pain in the back or belly that you cannot control with Tylenol.  Any questions or concerns.      Other Instructions:    If you received a stent - carry your stent card with you at all times.      [facility] [place]:    [phone] [facility] (7 days a week)    Or you may contact your provider via My Chart

## 2024-08-02 ENCOUNTER — TELEPHONE (OUTPATIENT)
Dept: CARDIOLOGY | Facility: CLINIC | Age: 74
End: 2024-08-02
Payer: COMMERCIAL

## 2024-08-02 NOTE — TELEPHONE ENCOUNTER
"Patient was admitted to [facility] on 7/30/24 with unstable angina.    PMH: hypertension, hyperlipidemia, intolerant of statins, recent abnormal coronary CTA.    7/31/24: Echo showed EF of 60-65%, no WMA, normal RV, no valve disease.    7/31/24: Coronary angiogram via RFA resulted in:      Ost LAD lesion is 15% stenosed.    Prox LAD lesion is 95% stenosed.    2nd Diag lesion is 99% stenosed.     Complex PCI of LAD/D2  IVUS guided.  The D2 is < 2.0mm  at ostia with the Lad (DAVILA 1/1/1/)  Kissing balloon technic and final POT of prox LAD>   We stented the prox to mid LAD with 2.75 stent and post dilated, kiss balloon, and final POT with 3.0 and 3.25 NC balloon. Also dilated side cell of LAD stent into ostium of D2.  Did not stent the D2 since it was < 2mm and had ALVIN 3 flow. No complications    Pt was started on baby ASA, NTG and Brilinta at time of discharge.    Called patient to discuss any post hospital d/c questions, review medication changes, and confirm f/u appts. Patient denied any questions regarding new medications or changes to PTA medications.     RN confirmed with patient that she was d/c with an adequate supply of the antiplatelet Brilinta, and reminded of importance of taking without interruption.     Pt has an Rx for PRN SL Nitroglycerin.     Patient denied any SOB, chest pain or light headedness. States she has had episodes of \"skipping heart beat\" in the AM both yesterday (while still in the hospital) and again this AM. Pt attributes it to drinking coffee. States she has had a past hx of occasional palpitations, but none that have lasted as long as these two episodes. States lasted 10-15 minutes each time and always after drinking coffee. States the staff came in to change telemetry electrodes yesterday AM during hospitalization as they said they were not picking up as well (which was during same time of palpitations). No other symptoms. Will route this note to Dr. Copeland and his Team RN for " review.    RFA cardiac cath site is without bleeding, swelling, redness or signs of infection. Pt states she developed a hematoma post procedure. No increased swelling since time of discharge.    RN confirmed with patient that she is scheduled for an OV on 8/6/24 at 0750 with BETSY Bhupinder Han at our [place] Office. Dr. Copeland's Team RN phone number provided.    Cardiac rehab is scheduled on 8/7/24 at 1245 in [place].    Patient advised to call clinic with any cardiac related questions or concerns prior to this betsy't. Patient verbalized understanding and agreed with plan. EDITH Knight RN.            [Follow-Up: _____] : a [unfilled] follow-up visit [FreeTextEntry1] : hypothyroidism,lipids

## 2024-08-02 NOTE — DISCHARGE SUMMARY
[facility]  Hospitalist Discharge Summary      Date of Admission:  7/30/2024  Date of Discharge:  8/1/2024  2:14 PM  Discharging Provider: Nasreen Farias MD  Discharge Service: Hospitalist Service    Discharge Diagnoses     Unstable angina  Coronary artery disease, s/p complex PCI with ADALID of LAD/D2, 7/31/2024  -      Benign essential hypertension     Hyperlipidemia, LDL 75   Intolerance to statins due to severe myalgias with atorvastatin     Previous tobacco use disorder     Mildly elevated creatinine-does not meet criteria for CRISTIAN     Pulmonary nodule 6 mm    Clinically Significant Risk Factors          Follow-ups Needed After Discharge   Follow-up Appointments     Follow-up and recommended labs and tests       Follow up with primary care provider, JEREMIAH CHAUHAN,   within 7 days for hospital follow- up.  No follow up labs or test are   needed.        {    Additional follow-up instructions/to-do's for PCP    : needs follow up CT chest in 3-6 months for pulmonary nodule        Unresulted Labs Ordered in the Past 30 Days of this Admission       No orders found from 6/30/2024 to 7/31/2024.            Discharge Disposition   Discharged to home  Condition at discharge: Stable    Hospital Course     Geneva Bond is a 74-year-old female with hypertension, hyperlipidemia, intolerant of statins, recent abnormal coronary CTA, who presented on 7/30/2024 due to worsening unstable angina with chest pain at rest.     Unstable angina  Coronary artery disease, s/p complex PCI with ADALID of LAD/D2, 7/31/2024  -   -Developed exertional chest pain in 4/2024.  -Coronary CTA 7/17 - showed calcium score 499/90th percentile, subtotal occlusion of mLAD, moderate to severe proximal circumflex stenosis  -now presented with chest pain at rest.   -EKG showed sinus rhythm, no acute ischemic changes.  Troponin normal at 6  -Underwent angiogram on 7/31. This showed 95% prox LAD and 99% 2nd diagonal  lesion, treated with complex PCI with ADALID x 1 to LAD/D2.  Prox to mid LAD was stented. D2 was stented as it was < 2mm and had ALVIN 3 flow.  -TTE showed normal LV EF of 60-65%, no WMA, normal RV, no valve disease  -started on DAPT - aspirin 81 mg + brillanta 90 mg bid  -continue Toprol-XL, fenofibrate  -As needed sublingual nitroglycerin     Benign essential hypertension  -Continue Toprol-XL, lisinopril-hydrochlorothiazide     Hyperlipidemia, LDL 75   Intolerance to statins due to severe myalgias with atorvastatin  -Patient hesitant to try any statin due to previous myalgias with atorvastatin.  -Cardiology planning to consider PCSK9 inhibitors as outpatient   -Lipid panel on admission - LDL 75, HDL 53     Previous tobacco use disorder  -Quit 40 years ago     Mildly elevated creatinine-does not meet criteria for CRISTIAN  -Baseline creatinine 0.7-0.8.  Creatinine 1.09 on admission.  Received IV fluid bolus.  Creatinine improved back to baseline of 0.83     Pulmonary nodule 6 mm  -Noted on CTA.  Needs follow-up CT in 3-6 months          Consultations This Hospital Stay   CARDIOLOGY IP CONSULT  CARE MANAGEMENT / SOCIAL WORK IP CONSULT  HOSPITALIST IP CONSULT  NUTRITION SERVICES ADULT IP CONSULT  CARDIAC REHAB IP CONSULT  SMOKING CESSATION PROGRAM IP CONSULT    Code Status   Prior    Time Spent on this Encounter   I, Nasreen Farias MD, personally saw the patient today and spent greater than 30 minutes discharging this patient.       Nasreen Farias MD  [facility] EXTENDED RECOVERY AND SHORT STAY  [address] [facility] [address]  Phone: [phone]  ______________________________________________________________________    Physical Exam   Vital Signs: Temp: 98  F (36.7  C) Temp src: Oral BP: (!) 140/63 Pulse: 66   Resp: 16 SpO2: 98 % O2 Device: None (Room air) Oxygen Delivery: 2 LPM  Weight: 137 lbs 14.4 oz    Constitutional - awake and alert, resting in bed, in no acute distress  Cardiovascular -  regular rate and rhythm, no murmurs, no edema  Pulmonary - lungs are clear to auscultation bilaterally, no wheezing or rhonchi  GI - abdomen is soft, nontender, nondistended, no hepatosplenomegaly or masses  Integumentary - skin is warm and dry, no rashes or ulcers  Neurological - awake, alert and oriented x3.  Moving all 4 extremities, normal speech, no focal deficits       Primary Care Physician   JEREMIAH CHAUHAN    Discharge Orders      Cardiac Rehab  Referral      Follow-Up with Cardiology SESAR      Reason for your hospital stay    Chest pain     Follow-up and recommended labs and tests     Follow up with primary care provider, JEREMIAH CHAUHAN, within 7 days for hospital follow- up.  No follow up labs or test are needed.     Activity    Your activity upon discharge: activity as tolerated     Diet    Follow this diet upon discharge: Low cholesterol       Significant Results and Procedures   Results for orders placed or performed during the hospital encounter of 24   Echocardiogram Complete     Value    LVEF  60%    Narrative    [id number]  [id number]  [id number]  [id number]^RUDDY^MARCO^JUAN PABLO     [facility]  Echocardiography Laboratory  [address] [place] [address]     Name: JUNE JI  MRN: [id number]  : 1950  Study Date: 2024 10:50 AM  Age: 74 yrs  Gender: Female  Patient Location: [facility]  Reason For Study: Chest Pain, Chest Pressure, Chest Tightness  Ordering Physician: MARCO FOX  Performed By: Shanon Warner     BSA: 1.7 m2  Height: 64 in  Weight: 138 lb  HR: 64  BP: 137/73 mmHg  ______________________________________________________________________________  Procedure  Complete Portable Echo Adult. Optison (NDC #[id number]) given intravenously.  Contrast Optison.  ______________________________________________________________________________  Interpretation Summary     1. The visual ejection fraction is  estimated at 60-65%. The left ventricle is  normal in structure, function and size. Normal left ventricular wall motion  2. The right ventricle is normal in structure, function and size.  3. No valve disease.     No previous echo for comparison.  ______________________________________________________________________________  Left Ventricle  The left ventricle is normal in structure, function and size. There is normal  left ventricular wall thickness. The visual ejection fraction is estimated at  60%. Left ventricular diastolic function is normal. Normal left ventricular  wall motion.     Right Ventricle  The right ventricle is normal in structure, function and size.     Atria  Normal left atrial size. Right atrial size is normal. There is no atrial shunt  seen.     Mitral Valve  The mitral valve is normal in structure and function.     Tricuspid Valve  No tricuspid regurgitation.     Aortic Valve  The aortic valve is normal in structure and function.     Pulmonic Valve  The pulmonic valve is normal in structure and function.     Vessels  Normal ascending, transverse (arch), and descending aorta. The inferior vena  cava was normal in size with preserved respiratory variability.     Pericardium  There is no pericardial effusion.     Rhythm  Sinus rhythm was noted.  ______________________________________________________________________________  MMode/2D Measurements & Calculations     IVSd: 1.0 cm  LVIDd: 4.1 cm  LVIDs: 2.3 cm  LVPWd: 0.80 cm  FS: 43.5 %  LV mass(C)d: 114.1 grams  LV mass(C)dI: 68.3 grams/m2  Ao root diam: 2.5 cm  asc Aorta Diam: 3.1 cm  LVOT diam: 1.9 cm  LVOT area: 2.8 cm2  Ao root diam index Ht(cm/m): 1.6  Ao root diam index BSA (cm/m2): 1.5  Asc Ao diam index BSA (cm/m2): 1.9  Asc Ao diam index Ht(cm/m): 1.9  LA Volume (BP): 34.0 ml     LA Volume Index (BP): 20.4 ml/m2  RWT: 0.39  TAPSE: 2.9 cm     Doppler Measurements & Calculations  MV E max steff: 88.0 cm/sec  MV A max steff: 89.6 cm/sec  MV E/A:  "0.98  MV dec slope: 390.7 cm/sec2  MV dec time: 0.23 sec  Ao V2 max: 110.0 cm/sec  Ao max P.0 mmHg  Ao V2 mean: 71.3 cm/sec  Ao mean P.0 mmHg  Ao V2 VTI: 24.7 cm  MELLY(I,D): 2.1 cm2  MELLY(V,D): 2.2 cm2  LV V1 max PG: 3.0 mmHg  LV V1 max: 86.0 cm/sec  LV V1 VTI: 18.9 cm  SV(LVOT): 52.7 ml  SI(LVOT): 31.5 ml/m2  PA acc time: 0.12 sec  AV Willi Ratio (DI): 0.78  MLELY Index (cm2/m2): 1.3  E/E' avg: 10.2     Lateral E/e': 9.0  Medial E/e': 11.4  RV S Willi: 15.6 cm/sec     ______________________________________________________________________________  Report approved by: Regine Lozano 2024 01:13 PM         Cardiac Catheterization    Narrative      Ost LAD lesion is 15% stenosed.    Prox LAD lesion is 95% stenosed.    2nd Diag lesion is 99% stenosed.    Complex PCI of LAD/D2  IVUS guided.  The D2 is < 2.0mm  at ostia with the   Lad (DAVILA /)  Kissing balloon technic and final POT of prox LAD>   We stented the prox   to mid LAD with 2.75 stent and post dilated, kiss balloon, and final POT   with 3.0 and 3.25 NC balloon. Also dilated side cell of LAD stent into   ostium of D2.  Did not stent the D2 since it was < 2mm and had ALVIN 3   flow. No complications  History of statin intolerance. Will try crestor 2.5mg  QOD         Discharge Medications   Discharge Medication List as of 2024  1:02 PM        CONTINUE these medications which have CHANGED    Details   aspirin 81 MG EC tablet Take 1 tablet (81 mg) by mouth daily Start tomorrow., Disp-90 tablet, R-3, E-Prescribe      nitroGLYcerin (NITROSTAT) 0.4 MG sublingual tablet One tablet under the tongue every 5 minutes if needed for chest pain. May repeat every 5 minutes for a maximum of 3 doses in 15 minutes\", Disp-25 tablet, R-3, E-Prescribe      ticagrelor (BRILINTA) 90 MG tablet Take 1 tablet (90 mg) by mouth 2 times daily Dose to start tomorrow morning., Disp-180 tablet, R-3, E-Prescribe           CONTINUE these medications which have NOT CHANGED " "   Details   Biotin 800 MCG TABS Take 1 tablet by mouth daily, Historical      Calcium Carb-Cholecalciferol (CALCIUM+D3 PO) Historical      fenofibrate (TRICOR) 145 MG tablet Take 1 tablet by mouth once daily, Disp-90 tablet, R-2, E-Prescribe      lisinopril-hydrochlorothiazide (ZESTORETIC) 10-12.5 MG tablet Take 1 tablet by mouth daily, Disp-90 tablet, R-2, E-Prescribe      metoprolol succinate ER (TOPROL XL) 25 MG 24 hr tablet Take 1 tablet (25 mg) by mouth 2 times daily, Disp-180 tablet, R-3, E-Prescribe      Multiple Vitamins-Minerals (PRESERVISION AREDS 2+MULTI VIT) CAPS Historical      omeprazole (PRILOSEC) 20 MG DR capsule Take 20 mg by mouth daily as needed (heartburn), Historical      vitamin B-Complex Take 1 tablet by mouth daily, Historical           STOP taking these medications       aspirin (ASA) 325 MG tablet Comments:   Reason for Stopping:             Allergies   Allergies   Allergen Reactions    Ibuprofen Hives     \"I have taken Advil twice and both times noticed a rash on my neck.  I have not taken it in many years now.\"    Atorvastatin Muscle Pain (Myalgia)     Couldn't use right arm     "

## 2024-08-06 ENCOUNTER — OFFICE VISIT (OUTPATIENT)
Dept: CARDIOLOGY | Facility: CLINIC | Age: 74
End: 2024-08-06
Payer: COMMERCIAL

## 2024-08-06 VITALS
DIASTOLIC BLOOD PRESSURE: 58 MMHG | OXYGEN SATURATION: 99 % | BODY MASS INDEX: 23.46 KG/M2 | WEIGHT: 137.4 LBS | SYSTOLIC BLOOD PRESSURE: 98 MMHG | HEIGHT: 64 IN | HEART RATE: 62 BPM

## 2024-08-06 DIAGNOSIS — I25.119 CORONARY ARTERY DISEASE INVOLVING NATIVE CORONARY ARTERY OF NATIVE HEART WITH ANGINA PECTORIS (H): ICD-10-CM

## 2024-08-06 DIAGNOSIS — I10 ESSENTIAL HYPERTENSION: ICD-10-CM

## 2024-08-06 DIAGNOSIS — E78.2 MIXED HYPERLIPIDEMIA: Primary | ICD-10-CM

## 2024-08-06 DIAGNOSIS — I20.0 UNSTABLE ANGINA (H): ICD-10-CM

## 2024-08-06 PROCEDURE — 99215 OFFICE O/P EST HI 40 MIN: CPT | Performed by: NURSE PRACTITIONER

## 2024-08-06 RX ORDER — METOPROLOL SUCCINATE 25 MG/1
25 TABLET, EXTENDED RELEASE ORAL AT BEDTIME
COMMUNITY
Start: 2024-08-06

## 2024-08-06 RX ORDER — ROSUVASTATIN CALCIUM 5 MG/1
2.5 TABLET, COATED ORAL EVERY OTHER DAY
Qty: 45 TABLET | Refills: 3 | Status: SHIPPED | OUTPATIENT
Start: 2024-08-06 | End: 2024-08-06

## 2024-08-06 RX ORDER — ROSUVASTATIN CALCIUM 5 MG/1
2.5 TABLET, COATED ORAL EVERY OTHER DAY
Qty: 45 TABLET | Refills: 3 | Status: SHIPPED | OUTPATIENT
Start: 2024-08-06

## 2024-08-06 NOTE — LETTER
Geneva Bond  [address] [place] [address]  [phone] (home)     : 1950          To Whom it May Concern:          Geneva Bond is under my care for her cardiovascular condition. She was recently hospitalized at [facility] from 2024 to 2024 from Please provide her with a wheelchair or medical transport for her upcoming travel.       Please contact me for questions or concerns.        Sincerely,            KENDRA Bond, CNP   Nurse Practitioner  [facility]  [phone]

## 2024-08-06 NOTE — LETTER
8/6/2024    JEREMIAH CHAUHAN MD  [address] [place] [id number]    RE: Geneva Bond       Dear Colleague,     I had the pleasure of seeing Geneva Bond in the [facility].    Cardiology Clinic Progress Note    Service Date: August 6, 2024  Primary Cardiology Team: Dr. Copeland    HISTORY OF PRESENT ILLNESS:  I had the pleasure of meeting Ms. Geneva Bond in the clinic today. She is a very pleasant 74 year old female with a past medical history notable for hypertension, hyperlipidemia with myalgias on atorvastatin, former smoking, GERD, and coronary artery disease.  She recently presented to [facility] on 7/30/24 with symptoms of chest pain concerning for unstable angina in the setting of her recent coronary CTA which showed significant CAD with a calcium score of 499 putting her in the 90th percentile with a subtotal occlusion of the mid LAD.  She was sent for invasive coronary angiography on 7/31/24 and underwent complex PCI of the LAD and second diagonal with kissing balloon technique and placement of a drug-eluting stent from the proximal pole to mid LAD with dilation of the side cell of the LAD stent into the ostium of the second diagonal.  The second diagonal was not stented since it was small at less than 2 mm and had ALVIN-3 flow.  Given the history of severe myalgias on atorvastatin, trial of rosuvastatin at a very low-dose of 2.5 mg every other day was suggested.  She was otherwise discharged on aspirin 81 mg daily and ticagrelor 90 mg twice daily.    Today, Ms. Bond presents to the clinic in follow up of of her recent hospitalization. She tells me that she has been doing okay since she has been off in the hospital.  She has been taking it fairly easy and has not been doing much exertion yet as she is scheduled to start up with cardiac rehab tomorrow. She has had a couple of brief episodes of chest discomfort when trying to fall asleep  at night. She has used her as needed omeprazole which she has taken in the past for management of her GERD and symptoms subsequently resolved. She has done some light walking and has been tolerating this okay without exertional chest pain. She notes that she had an episode of palpitations with a cup of coffee in the hospital the day after her recent PCI, so she has been nervous to start drinking coffee again or have any caffeine. Provided reassurance that it is unlikely that the episode in the hospital was related to the coffee intake and that this was likely more of a coincidence. Reviewed that caffeine intake in moderation around 1-2 cups of coffee per day should not cause an issue. She has not had any prolonged episodes of palpitations since she has been out of the hospital and denies any symptoms of shortness of breath, dizziness, or lightheadedness. She does report some generalized fatigue following her hospitalization which she attributes to metoprolol and noting that her blood pressures have been running on the lower side with a combination of metoprolol and lisinopril-hydrochlorothiazide which was continued following her recent hospitalization    Geneva works as a  in the area. She has a daughter, son-in-law, and grandchildren in [place] who she is hoping to visit in the coming weeks.    ASSESSMENT:  1.  Coronary artery disease s/p complex PCI of the LAD and second diagonal with ADALID x 1 on 7/31/24. Stable without clear recurrence of anginal symptoms post PCI.  2.  Hyperlipidemia. History of myalgias on atorvastatin. Currently on fenofibrate 145 mg once daily with LDL cholesterol of around  on most recent lipid profiles.  3.  Hypertension. Blood pressure well-controlled on lisinopril-hydrochlorothiazide 10-12.5 mg once daily and metoprolol succinate 25 mg twice daily.   4.  GERD. Managed with intermittent omeprazole use.    PLAN:  - Continue dual antiplatelet therapy with  aspirin 81 mg daily and ticagrelor 90 mg twice daily (or other P2Y12 inhibitor) for at least 1 year post PCI.  - Given reports of fatigue and blood pressure running on the low end, recommend trial of decreasing metoprolol succinate from 25 mg twice daily down to 25 mg once daily at bedtime. Reviewed that we could try decreasing this further to 12.5 mg at bed time if needed if she continues to have issues with fatigue, but ideally would like to keep her on at least a low-dose of a beta-blocker if able to tolerate this.  - Recommend trial of low dose rosuvastatin 2.5 mg every other day. She is agreeable to trying this after discussion regarding benefits of secondary prevention with statins with her CAD. We will recheck a fasting lipid profile ALT in about 2 months. If she tolerates this okay and follow-up we could gradually increase to 2.5 mg once daily and consider increasing further up to 5 to 10 mg once daily if able.  If she has recurrence of myalgias even on low-dose rosuvastatin, we could consider trying ezetimibe (Zetia) 10 mg once daily.  - Counseled on starting outpatient cardiac rehab as planned, trying to gradually increase her activity level and exercise, and trying to stick to a heart healthy Mediterranean style diet.  - Follow-up with Dr. Copeland or myself in about 3 months.     Thank you for the opportunity to participate in this pleasant patient's care. We would be happy to see her sooner if needed for any concerns in the meantime.    50 total minutes was spent today including chart review, precharting, history and exam, post visit documentation, and reviewing studies as outlined above.     KENDRA Bond, CNP   Nurse Practitioner  [facility]  Pager: [phone]  Text Page  (8am - 5pm, M-F)    Orders this Visit:  Orders Placed This Encounter   Procedures     Lipid panel reflex to direct LDL Fasting     ALT     Follow-Up with Cardiology     Orders Placed This Encounter    Medications     metoprolol succinate ER (TOPROL XL) 25 MG 24 hr tablet     Sig: Take 1 tablet (25 mg) by mouth at bedtime     DISCONTD: rosuvastatin (CRESTOR) 5 MG tablet     Sig: Take 0.5 tablets (2.5 mg) by mouth every other day     Dispense:  45 tablet     Refill:  3     rosuvastatin (CRESTOR) 5 MG tablet     Sig: Take 0.5 tablets (2.5 mg) by mouth every other day at bedtime     Dispense:  45 tablet     Refill:  3     Medications Discontinued During This Encounter   Medication Reason     metoprolol succinate ER (TOPROL XL) 25 MG 24 hr tablet      fenofibrate (TRICOR) 145 MG tablet Discontinued By Me     rosuvastatin (CRESTOR) 5 MG tablet      Encounter Diagnoses   Name Primary?     Unstable angina (H)      Coronary artery disease involving native coronary artery of native heart with angina pectoris (H24)      Mixed hyperlipidemia Yes     Essential hypertension      CURRENT MEDICATIONS:  Current Outpatient Medications   Medication Sig Dispense Refill     aspirin 81 MG EC tablet Take 1 tablet (81 mg) by mouth daily Start tomorrow. 90 tablet 3     Biotin 800 MCG TABS Take 1 tablet by mouth daily       Calcium Carb-Cholecalciferol (CALCIUM+D3 PO)        lisinopril-hydrochlorothiazide (ZESTORETIC) 10-12.5 MG tablet Take 1 tablet by mouth daily 90 tablet 2     metoprolol succinate ER (TOPROL XL) 25 MG 24 hr tablet Take 1 tablet (25 mg) by mouth at bedtime       Multiple Vitamins-Minerals (PRESERVISION AREDS 2+MULTI VIT) CAPS        omeprazole (PRILOSEC) 20 MG DR capsule Take 20 mg by mouth daily as needed (heartburn)       rosuvastatin (CRESTOR) 5 MG tablet Take 0.5 tablets (2.5 mg) by mouth every other day at bedtime 45 tablet 3     ticagrelor (BRILINTA) 90 MG tablet Take 1 tablet (90 mg) by mouth 2 times daily Dose to start tomorrow morning. 180 tablet 3     vitamin B-Complex Take 1 tablet by mouth daily       nitroGLYcerin (NITROSTAT) 0.4 MG sublingual tablet One tablet under the tongue every 5 minutes if needed  "for chest pain. May repeat every 5 minutes for a maximum of 3 doses in 15 minutes\" 25 tablet 3     ALLERGIES  Allergies   Allergen Reactions     Ibuprofen Hives     \"I have taken Advil twice and both times noticed a rash on my neck.  I have not taken it in many years now.\"     Atorvastatin Muscle Pain (Myalgia)     Couldn't use right arm     PAST MEDICAL, SURGICAL, FAMILY HISTORY:  History was reviewed and updated as needed, see medical record.    SOCIAL HISTORY:  Social History     Socioeconomic History     Marital status: Single     Spouse name: Not on file     Number of children: Not on file     Years of education: Not on file     Highest education level: Not on file   Occupational History     Not on file   Tobacco Use     Smoking status: Former     Current packs/day: 0.00     Average packs/day: 3.0 packs/day for 15.0 years (45.0 ttl pk-yrs)     Types: Cigarettes     Start date:      Quit date:      Years since quittin.6     Smokeless tobacco: Never   Substance and Sexual Activity     Alcohol use: Not Currently     Alcohol/week: 2.0 standard drinks of alcohol     Types: 2 Glasses of wine per week     Drug use: Not on file     Sexual activity: Not on file   Other Topics Concern     Parent/sibling w/ CABG, MI or angioplasty before 65F 55M? Not Asked   Social History Narrative     Not on file     Social Determinants of Health     Financial Resource Strain: Not on file   Food Insecurity: Not on file   Transportation Needs: Not on file   Physical Activity: Not on file   Stress: Not on file   Social Connections: Not on file   Interpersonal Safety: Not on file   Housing Stability: Not on file     Review of Systems:  Focused cardiovascular and respiratory review of systems is negative other than the symptoms noted above in the HPI.     Physical Exam:  Vitals: BP 98/58 (BP Location: Right arm, Patient Position: Sitting, Cuff Size: Adult Regular)   Pulse 62   Ht 1.626 m (5' 4\")   Wt 62.3 kg (137 lb 6.4 oz)  "  LMP  (LMP Unknown)   SpO2 99%   BMI 23.58 kg/m     Wt Readings from Last 4 Encounters:   08/06/24 62.3 kg (137 lb 6.4 oz)   08/01/24 62.6 kg (137 lb 14.4 oz)   07/30/24 62.6 kg (138 lb)   07/01/24 64.9 kg (143 lb)     Constitutional: Alert and in no acute distress.  Neck: No JVD.  Cardiovascular:  Regular rate and rhythm, no murmur, rub or gallop.   Respiratory: Breathing non-labored. Lungs are clear to auscultation with no wheezes or crackles bilaterally.  Right femoral access site with some localized healing ecchymosis. 2+ right femoral pulse with no bruit.    Gastrointestinal: Abdomen non-distended.  Extremities: No lower extremity edema.   Neuropsychiatric: No gross focal deficits. Affect appropriate. Mentation normal.     Recent Lab Results:  LIPID RESULTS:  Lab Results   Component Value Date    CHOL 137 07/31/2024    HDL 53 07/31/2024    LDL 75 07/31/2024    TRIG 44 07/31/2024     LIVER ENZYME RESULTS:  Lab Results   Component Value Date    AST 28 08/28/2023    ALT 19 08/28/2023     CBC RESULTS:  Lab Results   Component Value Date    WBC 5.6 07/30/2024    RBC 4.68 07/30/2024    HGB 13.2 07/30/2024    HCT 39.8 07/30/2024    MCV 85 07/30/2024    MCH 28.2 07/30/2024    MCHC 33.2 07/30/2024    RDW 14.1 07/30/2024     07/30/2024     BMP RESULTS:  Lab Results   Component Value Date     08/01/2024    POTASSIUM 3.8 08/01/2024    POTASSIUM 4.4 07/23/2021    CHLORIDE 104 08/01/2024    CHLORIDE 105 07/23/2021    CO2 26 08/01/2024    CO2 24 07/23/2021    ANIONGAP 9 08/01/2024    ANIONGAP 14 07/23/2021    GLC 92 08/01/2024    GLC 88 07/23/2021    BUN 17.5 08/01/2024    BUN 23 (H) 07/23/2021    CR 0.68 08/01/2024    GFRESTIMATED >90 08/01/2024    GFRESTIMATED >60 07/17/2024    GFRESTIMATED >60 11/02/2020    GFRESTBLACK >60 11/02/2020    YVETTE 9.4 08/01/2024      A1C RESULTS:  Lab Results   Component Value Date    A1C 5.6 07/30/2024     CC  Tab Copeland MD  [address]  [place]  [address]    Please kindly note that this document was completed in part using Dragon voice recognition software. Although reviewed after completion, some word substitutions and typographical errors may occur. Please contact me if clarification is needed.     Thank you for allowing me to participate in the care of your patient.      Sincerely,     Zechariah Han NP     [facility]  cc:   Michelle Hogue MD  [address] [place] [id number]

## 2024-08-06 NOTE — PATIENT INSTRUCTIONS
Thank you for your visit with the [facility] today.    Medication changes and/or recommendations from today:   - START rosuvastatin (Crestor) half tablet (2.5 mg) once every other day at bedtime.  - DECREASE the dose of metoprolol to 1 tablet (25 mg) once daily at bedtime rather than twice daily.  - STOP the fenofibrate for now.  - Try to stick to a heart healthy Mediterranean diet.  - Start cardiac rehab as planned and try to gradually increase your activity level    Follow up plan:   - Check fasting labs in about 2 months to recheck your cholesterol levels.  - Follow up with Dr. Copeland or Bhupinder in about 3 months.    If you have questions or concerns in the meantime please call the nurse team at [phone] or send a MyFab message.     Scheduling phone number: [phone]    It was a pleasure seeing you today!     KENDRA Bond, CNP  Nurse Practitioner  [facility]

## 2024-08-06 NOTE — PROGRESS NOTES
Cardiology Clinic Progress Note    Service Date: August 6, 2024  Primary Cardiology Team: Dr. Copeland    HISTORY OF PRESENT ILLNESS:  I had the pleasure of meeting Ms. Geneva Bond in the clinic today. She is a very pleasant 74 year old female with a past medical history notable for hypertension, hyperlipidemia with myalgias on atorvastatin, former smoking, GERD, and coronary artery disease.  She recently presented to [facility] on 7/30/24 with symptoms of chest pain concerning for unstable angina in the setting of her recent coronary CTA which showed significant CAD with a calcium score of 499 putting her in the 90th percentile with a subtotal occlusion of the mid LAD.  She was sent for invasive coronary angiography on 7/31/24 and underwent complex PCI of the LAD and second diagonal with kissing balloon technique and placement of a drug-eluting stent from the proximal pole to mid LAD with dilation of the side cell of the LAD stent into the ostium of the second diagonal.  The second diagonal was not stented since it was small at less than 2 mm and had ALVIN-3 flow.  Given the history of severe myalgias on atorvastatin, trial of rosuvastatin at a very low-dose of 2.5 mg every other day was suggested.  She was otherwise discharged on aspirin 81 mg daily and ticagrelor 90 mg twice daily.    Today, Ms. Bond presents to the clinic in follow up of of her recent hospitalization. She tells me that she has been doing okay since she has been off in the hospital.  She has been taking it fairly easy and has not been doing much exertion yet as she is scheduled to start up with cardiac rehab tomorrow. She has had a couple of brief episodes of chest discomfort when trying to fall asleep at night. She has used her as needed omeprazole which she has taken in the past for management of her GERD and symptoms subsequently resolved. She has done some light walking and has been tolerating this okay  without exertional chest pain. She notes that she had an episode of palpitations with a cup of coffee in the hospital the day after her recent PCI, so she has been nervous to start drinking coffee again or have any caffeine. Provided reassurance that it is unlikely that the episode in the hospital was related to the coffee intake and that this was likely more of a coincidence. Reviewed that caffeine intake in moderation around 1-2 cups of coffee per day should not cause an issue. She has not had any prolonged episodes of palpitations since she has been out of the hospital and denies any symptoms of shortness of breath, dizziness, or lightheadedness. She does report some generalized fatigue following her hospitalization which she attributes to metoprolol and noting that her blood pressures have been running on the lower side with a combination of metoprolol and lisinopril-hydrochlorothiazide which was continued following her recent hospitalization    Geneva works as a  in the area. She has a daughter, son-in-law, and grandchildren in [place] who she is hoping to visit in the coming weeks.    ASSESSMENT:  1.  Coronary artery disease s/p complex PCI of the LAD and second diagonal with ADALID x 1 on 7/31/24. Stable without clear recurrence of anginal symptoms post PCI.  2.  Hyperlipidemia. History of myalgias on atorvastatin. Currently on fenofibrate 145 mg once daily with LDL cholesterol of around  on most recent lipid profiles.  3.  Hypertension. Blood pressure well-controlled on lisinopril-hydrochlorothiazide 10-12.5 mg once daily and metoprolol succinate 25 mg twice daily.   4.  GERD. Managed with intermittent omeprazole use.    PLAN:  - Continue dual antiplatelet therapy with aspirin 81 mg daily and ticagrelor 90 mg twice daily (or other P2Y12 inhibitor) for at least 1 year post PCI.  - Given reports of fatigue and blood pressure running on the low end, recommend trial of decreasing  metoprolol succinate from 25 mg twice daily down to 25 mg once daily at bedtime. Reviewed that we could try decreasing this further to 12.5 mg at bed time if needed if she continues to have issues with fatigue, but ideally would like to keep her on at least a low-dose of a beta-blocker if able to tolerate this.  - Recommend trial of low dose rosuvastatin 2.5 mg every other day. She is agreeable to trying this after discussion regarding benefits of secondary prevention with statins with her CAD. We will recheck a fasting lipid profile ALT in about 2 months. If she tolerates this okay and follow-up we could gradually increase to 2.5 mg once daily and consider increasing further up to 5 to 10 mg once daily if able.  If she has recurrence of myalgias even on low-dose rosuvastatin, we could consider trying ezetimibe (Zetia) 10 mg once daily.  - Counseled on starting outpatient cardiac rehab as planned, trying to gradually increase her activity level and exercise, and trying to stick to a heart healthy Mediterranean style diet.  - Follow-up with Dr. Copeland or myself in about 3 months.     Thank you for the opportunity to participate in this pleasant patient's care. We would be happy to see her sooner if needed for any concerns in the meantime.    50 total minutes was spent today including chart review, precharting, history and exam, post visit documentation, and reviewing studies as outlined above.     KENDRA Bond, CNP   Nurse Practitioner  [facility]  Pager: [phone]  Text Page  (8am - 5pm, M-F)    Orders this Visit:  Orders Placed This Encounter   Procedures    Lipid panel reflex to direct LDL Fasting    ALT    Follow-Up with Cardiology     Orders Placed This Encounter   Medications    metoprolol succinate ER (TOPROL XL) 25 MG 24 hr tablet     Sig: Take 1 tablet (25 mg) by mouth at bedtime    DISCONTD: rosuvastatin (CRESTOR) 5 MG tablet     Sig: Take 0.5 tablets (2.5 mg) by mouth every other  "day     Dispense:  45 tablet     Refill:  3    rosuvastatin (CRESTOR) 5 MG tablet     Sig: Take 0.5 tablets (2.5 mg) by mouth every other day at bedtime     Dispense:  45 tablet     Refill:  3     Medications Discontinued During This Encounter   Medication Reason    metoprolol succinate ER (TOPROL XL) 25 MG 24 hr tablet     fenofibrate (TRICOR) 145 MG tablet Discontinued By Me    rosuvastatin (CRESTOR) 5 MG tablet      Encounter Diagnoses   Name Primary?    Unstable angina (H)     Coronary artery disease involving native coronary artery of native heart with angina pectoris (H24)     Mixed hyperlipidemia Yes    Essential hypertension      CURRENT MEDICATIONS:  Current Outpatient Medications   Medication Sig Dispense Refill    aspirin 81 MG EC tablet Take 1 tablet (81 mg) by mouth daily Start tomorrow. 90 tablet 3    Biotin 800 MCG TABS Take 1 tablet by mouth daily      Calcium Carb-Cholecalciferol (CALCIUM+D3 PO)       lisinopril-hydrochlorothiazide (ZESTORETIC) 10-12.5 MG tablet Take 1 tablet by mouth daily 90 tablet 2    metoprolol succinate ER (TOPROL XL) 25 MG 24 hr tablet Take 1 tablet (25 mg) by mouth at bedtime      Multiple Vitamins-Minerals (PRESERVISION AREDS 2+MULTI VIT) CAPS       omeprazole (PRILOSEC) 20 MG DR capsule Take 20 mg by mouth daily as needed (heartburn)      rosuvastatin (CRESTOR) 5 MG tablet Take 0.5 tablets (2.5 mg) by mouth every other day at bedtime 45 tablet 3    ticagrelor (BRILINTA) 90 MG tablet Take 1 tablet (90 mg) by mouth 2 times daily Dose to start tomorrow morning. 180 tablet 3    vitamin B-Complex Take 1 tablet by mouth daily      nitroGLYcerin (NITROSTAT) 0.4 MG sublingual tablet One tablet under the tongue every 5 minutes if needed for chest pain. May repeat every 5 minutes for a maximum of 3 doses in 15 minutes\" 25 tablet 3     ALLERGIES  Allergies   Allergen Reactions    Ibuprofen Hives     \"I have taken Advil twice and both times noticed a rash on my neck.  I have not taken " "it in many years now.\"    Atorvastatin Muscle Pain (Myalgia)     Couldn't use right arm     PAST MEDICAL, SURGICAL, FAMILY HISTORY:  History was reviewed and updated as needed, see medical record.    SOCIAL HISTORY:  Social History     Socioeconomic History    Marital status: Single     Spouse name: Not on file    Number of children: Not on file    Years of education: Not on file    Highest education level: Not on file   Occupational History    Not on file   Tobacco Use    Smoking status: Former     Current packs/day: 0.00     Average packs/day: 3.0 packs/day for 15.0 years (45.0 ttl pk-yrs)     Types: Cigarettes     Start date:      Quit date:      Years since quittin.6    Smokeless tobacco: Never   Substance and Sexual Activity    Alcohol use: Not Currently     Alcohol/week: 2.0 standard drinks of alcohol     Types: 2 Glasses of wine per week    Drug use: Not on file    Sexual activity: Not on file   Other Topics Concern    Parent/sibling w/ CABG, MI or angioplasty before 65F 55M? Not Asked   Social History Narrative    Not on file     Social Determinants of Health     Financial Resource Strain: Not on file   Food Insecurity: Not on file   Transportation Needs: Not on file   Physical Activity: Not on file   Stress: Not on file   Social Connections: Not on file   Interpersonal Safety: Not on file   Housing Stability: Not on file     Review of Systems:  Focused cardiovascular and respiratory review of systems is negative other than the symptoms noted above in the HPI.     Physical Exam:  Vitals: BP 98/58 (BP Location: Right arm, Patient Position: Sitting, Cuff Size: Adult Regular)   Pulse 62   Ht 1.626 m (5' 4\")   Wt 62.3 kg (137 lb 6.4 oz)   LMP  (LMP Unknown)   SpO2 99%   BMI 23.58 kg/m     Wt Readings from Last 4 Encounters:   24 62.3 kg (137 lb 6.4 oz)   24 62.6 kg (137 lb 14.4 oz)   24 62.6 kg (138 lb)   24 64.9 kg (143 lb)     Constitutional: Alert and in no acute " distress.  Neck: No JVD.  Cardiovascular:  Regular rate and rhythm, no murmur, rub or gallop.   Respiratory: Breathing non-labored. Lungs are clear to auscultation with no wheezes or crackles bilaterally.  Right femoral access site with some localized healing ecchymosis. 2+ right femoral pulse with no bruit.    Gastrointestinal: Abdomen non-distended.  Extremities: No lower extremity edema.   Neuropsychiatric: No gross focal deficits. Affect appropriate. Mentation normal.     Recent Lab Results:  LIPID RESULTS:  Lab Results   Component Value Date    CHOL 137 07/31/2024    HDL 53 07/31/2024    LDL 75 07/31/2024    TRIG 44 07/31/2024     LIVER ENZYME RESULTS:  Lab Results   Component Value Date    AST 28 08/28/2023    ALT 19 08/28/2023     CBC RESULTS:  Lab Results   Component Value Date    WBC 5.6 07/30/2024    RBC 4.68 07/30/2024    HGB 13.2 07/30/2024    HCT 39.8 07/30/2024    MCV 85 07/30/2024    MCH 28.2 07/30/2024    MCHC 33.2 07/30/2024    RDW 14.1 07/30/2024     07/30/2024     BMP RESULTS:  Lab Results   Component Value Date     08/01/2024    POTASSIUM 3.8 08/01/2024    POTASSIUM 4.4 07/23/2021    CHLORIDE 104 08/01/2024    CHLORIDE 105 07/23/2021    CO2 26 08/01/2024    CO2 24 07/23/2021    ANIONGAP 9 08/01/2024    ANIONGAP 14 07/23/2021    GLC 92 08/01/2024    GLC 88 07/23/2021    BUN 17.5 08/01/2024    BUN 23 (H) 07/23/2021    CR 0.68 08/01/2024    GFRESTIMATED >90 08/01/2024    GFRESTIMATED >60 07/17/2024    GFRESTIMATED >60 11/02/2020    GFRESTBLACK >60 11/02/2020    YVETTE 9.4 08/01/2024      A1C RESULTS:  Lab Results   Component Value Date    A1C 5.6 07/30/2024     CC  Tab Copeland MD  [id number]  [place] [id number]    Please kindly note that this document was completed in part using Dragon voice recognition software. Although reviewed after completion, some word substitutions and typographical errors may occur. Please contact me if clarification is needed.

## 2024-08-07 ENCOUNTER — HOSPITAL ENCOUNTER (OUTPATIENT)
Dept: CARDIAC REHAB | Facility: CLINIC | Age: 74
Discharge: HOME | End: 2024-08-07
Attending: INTERNAL MEDICINE
Payer: COMMERCIAL

## 2024-08-07 DIAGNOSIS — I20.0 UNSTABLE ANGINA (H): ICD-10-CM

## 2024-08-07 PROCEDURE — 93798 PHYS/QHP OP CAR RHAB W/ECG: CPT

## 2024-08-07 PROCEDURE — 93797 PHYS/QHP OP CAR RHAB WO ECG: CPT | Mod: 59

## 2024-08-09 ENCOUNTER — HOSPITAL ENCOUNTER (OUTPATIENT)
Dept: CARDIAC REHAB | Facility: CLINIC | Age: 74
Discharge: HOME | End: 2024-08-09
Attending: INTERNAL MEDICINE
Payer: COMMERCIAL

## 2024-08-09 PROCEDURE — 93798 PHYS/QHP OP CAR RHAB W/ECG: CPT | Mod: KX

## 2024-08-12 ENCOUNTER — HOSPITAL ENCOUNTER (OUTPATIENT)
Dept: CARDIAC REHAB | Facility: CLINIC | Age: 74
Discharge: HOME | End: 2024-08-12
Attending: INTERNAL MEDICINE
Payer: COMMERCIAL

## 2024-08-12 PROCEDURE — 93798 PHYS/QHP OP CAR RHAB W/ECG: CPT

## 2024-08-14 ENCOUNTER — OFFICE VISIT (OUTPATIENT)
Dept: URGENT CARE | Facility: URGENT CARE | Age: 74
End: 2024-08-14
Payer: COMMERCIAL

## 2024-08-14 ENCOUNTER — TELEPHONE (OUTPATIENT)
Facility: CLINIC | Age: 74
End: 2024-08-14
Payer: COMMERCIAL

## 2024-08-14 ENCOUNTER — HOSPITAL ENCOUNTER (OUTPATIENT)
Dept: CARDIAC REHAB | Facility: CLINIC | Age: 74
Discharge: HOME | End: 2024-08-14
Attending: INTERNAL MEDICINE
Payer: COMMERCIAL

## 2024-08-14 VITALS
OXYGEN SATURATION: 99 % | TEMPERATURE: 98.7 F | SYSTOLIC BLOOD PRESSURE: 124 MMHG | HEART RATE: 68 BPM | DIASTOLIC BLOOD PRESSURE: 71 MMHG

## 2024-08-14 DIAGNOSIS — R30.0 DYSURIA: Primary | ICD-10-CM

## 2024-08-14 LAB
ALBUMIN UR-MCNC: NEGATIVE MG/DL
APPEARANCE UR: CLEAR
BACTERIA #/AREA URNS HPF: ABNORMAL /HPF
BILIRUB UR QL STRIP: NEGATIVE
COLOR UR AUTO: YELLOW
GLUCOSE UR STRIP-MCNC: NEGATIVE MG/DL
HGB UR QL STRIP: NEGATIVE
KETONES UR STRIP-MCNC: NEGATIVE MG/DL
LEUKOCYTE ESTERASE UR QL STRIP: ABNORMAL
NITRATE UR QL: NEGATIVE
PH UR STRIP: 6.5 [PH] (ref 5–7)
RBC #/AREA URNS AUTO: ABNORMAL /HPF
SP GR UR STRIP: 1.02 (ref 1–1.03)
SQUAMOUS #/AREA URNS AUTO: ABNORMAL /LPF
UROBILINOGEN UR STRIP-ACNC: 0.2 E.U./DL
WBC #/AREA URNS AUTO: ABNORMAL /HPF

## 2024-08-14 PROCEDURE — 93798 PHYS/QHP OP CAR RHAB W/ECG: CPT

## 2024-08-14 PROCEDURE — 87086 URINE CULTURE/COLONY COUNT: CPT | Performed by: PHYSICIAN ASSISTANT

## 2024-08-14 PROCEDURE — 99213 OFFICE O/P EST LOW 20 MIN: CPT | Performed by: PHYSICIAN ASSISTANT

## 2024-08-14 PROCEDURE — 81001 URINALYSIS AUTO W/SCOPE: CPT | Performed by: PHYSICIAN ASSISTANT

## 2024-08-14 RX ORDER — CEFDINIR 300 MG/1
300 CAPSULE ORAL 2 TIMES DAILY
Qty: 14 CAPSULE | Refills: 0 | Status: SHIPPED | OUTPATIENT
Start: 2024-08-14 | End: 2024-08-21

## 2024-08-14 NOTE — PATIENT INSTRUCTIONS
You were given a watch and wait prescription, if the symptoms worsen, start antibiotic  We should know more in 2 days when the urine culture is in.   If development of fever, chills, vomiting unable to hold down fluids, flank pain or weakness/dizziness/ lightheadedness to follow-up in clinic or ER right away.

## 2024-08-14 NOTE — PROGRESS NOTES
Assessment & Plan     1. Dysuria  - UA Macroscopic with reflex to Microscopic and Culture  - UA Microscopic with Reflex to Culture      Urine is rather clear today with just 5-10 WBC. No evidence of pyelonephritis, urosepsis or nephrolithiasis.  She was given a watch and wait prescription, to be used if her symptoms worsen.  Her urine culture is pending, if this is positive, she should be contacted to start the antibiotics.  Push fluids  Discussed with patient if development of fever, chills, vomiting unable to hold down fluids, flank pain or weakness/dizziness/ lightheadedness to follow-up in clinic or ER right away.     Return in about 5 days (around 8/19/2024), or if symptoms worsen or fail to improve.    Diagnosis and treatment plan was reviewed with patient and/or family.   We went over any labs or imaging. Discussed worsening symptoms or little to no relief despite treatment plan to follow-up with PCP or return to clinic.  Patient verbalizes understanding. All questions were addressed and answered.     Elis Alamo PA-C  [facility] [place]    CHIEF COMPLAINT:   Chief Complaint   Patient presents with    Urgent Care     FREQ, URGENCY, DYSURIA X 2 DAYS     Subjective     Geneva is a 74 year old female who presents to clinic today for evaluation of burning, frequency and urgency. Symptoms started 2 days ago. Denies having fever, chills, flank pain, nausea, vomiting, hematuria or weakness.  Vaginal discharge, itching or pain are not present.     Past Medical History:   Diagnosis Date    Benign essential hypertension     CAD (coronary artery disease)     Hyperlipidemia      Past Surgical History:   Procedure Laterality Date    CV CORONARY ANGIOGRAM N/A 7/31/2024    Procedure: Coronary Angiogram;  Surgeon: Hiren Hwang MD;  Location:  [facility]ART [facility] LAB    CV INTRAVASULAR ULTRASOUND N/A 7/31/2024    Procedure: Intravascular Ultrasound;  Surgeon: Hiren Hwang MD;  Location:  " [facility] LAB    CV PCI N/A 2024    Procedure: Percutaneous Coronary Intervention;  Surgeon: Hiren Hwang MD;  Location:  [facility] LAB    LAPAROSCOPIC CHOLECYSTECTOMY       Social History     Tobacco Use    Smoking status: Former     Current packs/day: 0.00     Average packs/day: 3.0 packs/day for 15.0 years (45.0 ttl pk-yrs)     Types: Cigarettes     Start date:      Quit date:      Years since quittin.6    Smokeless tobacco: Never   Substance Use Topics    Alcohol use: Not Currently     Alcohol/week: 2.0 standard drinks of alcohol     Types: 2 Glasses of wine per week     Current Outpatient Medications   Medication Sig Dispense Refill    aspirin 81 MG EC tablet Take 1 tablet (81 mg) by mouth daily Start tomorrow. 90 tablet 3    Biotin 800 MCG TABS Take 1 tablet by mouth daily      Calcium Carb-Cholecalciferol (CALCIUM+D3 PO)       cefdinir (OMNICEF) 300 MG capsule Take 1 capsule (300 mg) by mouth 2 times daily for 7 days 14 capsule 0    lisinopril-hydrochlorothiazide (ZESTORETIC) 10-12.5 MG tablet Take 1 tablet by mouth daily 90 tablet 2    metoprolol succinate ER (TOPROL XL) 25 MG 24 hr tablet Take 1 tablet (25 mg) by mouth at bedtime      Multiple Vitamins-Minerals (PRESERVISION AREDS 2+MULTI VIT) CAPS       nitroGLYcerin (NITROSTAT) 0.4 MG sublingual tablet One tablet under the tongue every 5 minutes if needed for chest pain. May repeat every 5 minutes for a maximum of 3 doses in 15 minutes\" 25 tablet 3    omeprazole (PRILOSEC) 20 MG DR capsule Take 20 mg by mouth daily as needed (heartburn)      rosuvastatin (CRESTOR) 5 MG tablet Take 0.5 tablets (2.5 mg) by mouth every other day at bedtime 45 tablet 3    ticagrelor (BRILINTA) 90 MG tablet Take 1 tablet (90 mg) by mouth 2 times daily Dose to start tomorrow morning. 180 tablet 3    vitamin B-Complex Take 1 tablet by mouth daily       No current facility-administered medications for this visit.     Allergies " "  Allergen Reactions    Ibuprofen Hives     \"I have taken Advil twice and both times noticed a rash on my neck.  I have not taken it in many years now.\"    Atorvastatin Muscle Pain (Myalgia)     Couldn't use right arm       10 point ROS of systems were all negative except for pertinent positives noted in my HPI.      Exam:   /71   Pulse 68   Temp 98.7  F (37.1  C) (Tympanic)   LMP  (LMP Unknown)   SpO2 99%   Constitutional: healthy, alert and no distress  ENT: MMM  Cardiovascular: RRR  Respiratory: CTA bilaterally, no rhonchi or rales  Gastrointestinal: soft and nontender  Back: No CVA tenderness B/L  Skin: no rashes      Results for orders placed or performed in visit on 08/14/24   UA Macroscopic with reflex to Microscopic and Culture     Status: Abnormal    Specimen: Urine, Clean Catch   Result Value Ref Range    Color Urine Yellow Colorless, Straw, Light Yellow, Yellow    Appearance Urine Clear Clear    Glucose Urine Negative Negative mg/dL    Bilirubin Urine Negative Negative    Ketones Urine Negative Negative mg/dL    Specific Gravity Urine 1.020 1.003 - 1.035    Blood Urine Negative Negative    pH Urine 6.5 5.0 - 7.0    Protein Albumin Urine Negative Negative mg/dL    Urobilinogen Urine 0.2 0.2, 1.0 E.U./dL    Nitrite Urine Negative Negative    Leukocyte Esterase Urine Small (A) Negative   UA Microscopic with Reflex to Culture     Status: Abnormal   Result Value Ref Range    Bacteria Urine Few (A) None Seen /HPF    RBC Urine 0-2 0-2 /HPF /HPF    WBC Urine 5-10 (A) 0-5 /HPF /HPF    Squamous Epithelials Urine None Seen None Seen /LPF    Narrative    Urine Culture not indicated           "

## 2024-08-14 NOTE — TELEPHONE ENCOUNTER
Patient called due to s/s of UTI  Wondering if providers would call in abx for her or treat   Called back. Left message. Advised patient utilize PCP or e-visit for UTI sx and treatment    Malik PALOMINO RN Care Coordinator  [facility]

## 2024-08-15 ENCOUNTER — HOSPITAL ENCOUNTER (OUTPATIENT)
Dept: CARDIAC REHAB | Facility: CLINIC | Age: 74
Discharge: HOME | End: 2024-08-15
Attending: INTERNAL MEDICINE
Payer: COMMERCIAL

## 2024-08-15 PROCEDURE — 93798 PHYS/QHP OP CAR RHAB W/ECG: CPT

## 2024-08-16 ENCOUNTER — HOSPITAL ENCOUNTER (OUTPATIENT)
Dept: CARDIAC REHAB | Facility: CLINIC | Age: 74
Discharge: HOME | End: 2024-08-16
Attending: INTERNAL MEDICINE
Payer: COMMERCIAL

## 2024-08-16 LAB — BACTERIA UR CULT: NORMAL

## 2024-08-16 PROCEDURE — 93798 PHYS/QHP OP CAR RHAB W/ECG: CPT

## 2024-08-19 ENCOUNTER — HOSPITAL ENCOUNTER (OUTPATIENT)
Dept: CARDIAC REHAB | Facility: CLINIC | Age: 74
Discharge: HOME | End: 2024-08-19
Attending: INTERNAL MEDICINE
Payer: COMMERCIAL

## 2024-08-19 PROCEDURE — 93798 PHYS/QHP OP CAR RHAB W/ECG: CPT

## 2024-08-29 ENCOUNTER — HOSPITAL ENCOUNTER (OUTPATIENT)
Dept: CARDIAC REHAB | Facility: CLINIC | Age: 74
Discharge: HOME | End: 2024-08-29
Attending: INTERNAL MEDICINE
Payer: COMMERCIAL

## 2024-08-29 PROCEDURE — 93798 PHYS/QHP OP CAR RHAB W/ECG: CPT

## 2024-08-30 ENCOUNTER — HOSPITAL ENCOUNTER (OUTPATIENT)
Dept: CARDIAC REHAB | Facility: CLINIC | Age: 74
Discharge: HOME | End: 2024-08-30
Attending: INTERNAL MEDICINE
Payer: COMMERCIAL

## 2024-08-30 PROCEDURE — 93798 PHYS/QHP OP CAR RHAB W/ECG: CPT

## 2024-09-03 ENCOUNTER — HOSPITAL ENCOUNTER (OUTPATIENT)
Dept: CARDIAC REHAB | Facility: CLINIC | Age: 74
Discharge: HOME | End: 2024-09-03
Attending: INTERNAL MEDICINE
Payer: COMMERCIAL

## 2024-09-03 PROCEDURE — 93798 PHYS/QHP OP CAR RHAB W/ECG: CPT

## 2024-09-04 ENCOUNTER — HOSPITAL ENCOUNTER (OUTPATIENT)
Dept: CARDIAC REHAB | Facility: CLINIC | Age: 74
Discharge: HOME | End: 2024-09-04
Attending: INTERNAL MEDICINE
Payer: COMMERCIAL

## 2024-09-04 ENCOUNTER — OFFICE VISIT (OUTPATIENT)
Dept: FAMILY MEDICINE | Facility: CLINIC | Age: 74
End: 2024-09-04
Attending: FAMILY MEDICINE
Payer: COMMERCIAL

## 2024-09-04 VITALS
WEIGHT: 141 LBS | SYSTOLIC BLOOD PRESSURE: 121 MMHG | BODY MASS INDEX: 23.49 KG/M2 | DIASTOLIC BLOOD PRESSURE: 71 MMHG | HEIGHT: 65 IN | OXYGEN SATURATION: 98 % | RESPIRATION RATE: 16 BRPM | HEART RATE: 63 BPM | TEMPERATURE: 97.3 F

## 2024-09-04 DIAGNOSIS — K21.9 GASTROESOPHAGEAL REFLUX DISEASE WITHOUT ESOPHAGITIS: ICD-10-CM

## 2024-09-04 DIAGNOSIS — F10.20 ALCOHOL DEPENDENCE, DAILY USE (H): ICD-10-CM

## 2024-09-04 DIAGNOSIS — I20.0 UNSTABLE ANGINA (H): ICD-10-CM

## 2024-09-04 DIAGNOSIS — E55.9 VITAMIN D DEFICIENCY: ICD-10-CM

## 2024-09-04 DIAGNOSIS — Z00.00 ENCOUNTER FOR MEDICARE ANNUAL WELLNESS EXAM: Primary | ICD-10-CM

## 2024-09-04 DIAGNOSIS — I10 ESSENTIAL HYPERTENSION: ICD-10-CM

## 2024-09-04 DIAGNOSIS — E78.2 MIXED HYPERLIPIDEMIA: ICD-10-CM

## 2024-09-04 DIAGNOSIS — E53.8 VITAMIN B12 DEFICIENCY (NON ANEMIC): ICD-10-CM

## 2024-09-04 DIAGNOSIS — Z29.11 NEED FOR VACCINATION AGAINST RESPIRATORY SYNCYTIAL VIRUS: ICD-10-CM

## 2024-09-04 PROCEDURE — 99397 PER PM REEVAL EST PAT 65+ YR: CPT | Performed by: FAMILY MEDICINE

## 2024-09-04 PROCEDURE — 93798 PHYS/QHP OP CAR RHAB W/ECG: CPT

## 2024-09-04 RX ORDER — COVID-19 VACCINE, MRNA 0.04 MG/.418ML
INJECTION, SUSPENSION INTRAMUSCULAR
COMMUNITY
Start: 2023-10-03

## 2024-09-04 NOTE — PROGRESS NOTES
Preventive Care Visit  [facility]  JEREMIAH CHAUHAN MD, Family Medicine  Sep 4, 2024      1. Encounter for Medicare annual wellness exam  This is a 75 yo female here for AWV - still working as an educator in [employer]     2. Essential hypertension  Hypertension - blood pressures is well controlled - continue to monitor and follow     3. Mixed hyperlipidemia  Elevated lipids -- on statin therapy -     4. Vitamin B12 deficiency (non anemic)  H/o low Vitamin B12 -     5. Vitamin D deficiency  H/o low vitamin D     6. Gastroesophageal reflux disease without esophagitis  GE reflux - no new symptoms -     7. Alcohol dependence, daily use (H)  Daily alcohol use -discussed -     8. Need for vaccination against respiratory syncytial virus  Due for RSV vaccine - low risk - OK to wait until 78 yo.      9.  Unstable angina   S/p stents - on Brilinta now     Subjective   Geneva is a 74 year old, presenting for the following:  Wellness Visit        9/4/2024     7:01 AM   Additional Questions   Roomed by Adolph LUCAS   Accompanied by self        Health Care Directive  Patient does not have a Health Care Directive or Living Will: Advance Directive received and scanned. Click on Code in the patient header to view.    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 Good   Feel stress (tense, anxious, or unable to sleep) Only a little      (!) STRESS CONCERN      9/4/2024   Nutrition   Diet: Low fat/cholesterol            9/4/2024   Exercise   Days per week of moderate/strenous exercise 4 days   Average  minutes spent exercising at this level 30 min            9/4/2024   Social Factors   Frequency of gathering with friends or relatives Once a week   Worry food won't last until get money to buy more No   Food not last or not have enough money for food? No   Do you have housing? (Housing is defined as stable permanent housing and does not include staying ouside in a car, in a tent, in an abandoned building, in an overnight shelter, or couch-surfing.) Yes   Are you worried about losing your housing? No   Lack of transportation? No   Unable to get utilities (heat,electricity)? No            9/4/2024   Fall Risk   Fallen 2 or more times in the past year? No    No   Trouble with walking or balance? No    No       Multiple values from one day are sorted in reverse-chronological order          9/4/2024   Activities of Daily Living- Home Safety   Needs help with the following daily activites None of the above   Safety concerns in the home None of the above            9/4/2024   Dental   Dentist two times every year? Yes            9/4/2024   Hearing Screening   Hearing concerns? (!) I NEED TO ASK PEOPLE TO SPEAK UP OR REPEAT THEMSELVES.    (!) IT'S HARD TO FOLLOW A CONVERSATION IN A NOISY RESTAURANT OR CROWDED ROOM.       Multiple values from one day are sorted in reverse-chronological order         9/4/2024   Driving Risk Screening   Patient/family members have concerns about driving No            9/4/2024   General Alertness/Fatigue Screening   Have you been more tired than usual lately? (!) YES            9/4/2024   Urinary Incontinence Screening   Bothered by leaking urine in past 6 months Yes            9/4/2024   TB Screening   Were you born outside of the [place]? Yes            Today's PHQ-2 Score:       9/4/2024     6:56 AM   PHQ-2 ( 1999 Pf[employer])   Q1: Little interest or pleasure in doing things 0   Q2: Feeling down, depressed or hopeless 0   PHQ-2 Score 0   Q1: Little interest or pleasure in doing things Not at all    Q2: Feeling down, depressed or hopeless Not at all   PHQ-2 Score 0           2024   Substance Use   Alcohol more than 3/day or more than 7/wk No   Do you have a current opioid prescription? No   How severe/bad is pain from 1 to 10? 0/10 (No Pain)   Do you use any other substances recreationally? No        Social History     Tobacco Use    Smoking status: Former     Current packs/day: 0.00     Average packs/day: 3.0 packs/day for 15.0 years (45.0 ttl pk-yrs)     Types: Cigarettes     Start date:      Quit date:      Years since quittin.7     Passive exposure: Never    Smokeless tobacco: Never   Vaping Use    Vaping status: Never Used   Substance Use Topics    Alcohol use: Not Currently     Alcohol/week: 2.0 standard drinks of alcohol     Types: 2 Glasses of wine per week           2023   LAST FHS-7 RESULTS   1st degree relative breast or ovarian cancer Yes   Any relative bilateral breast cancer No   Any male have breast cancer No   Any ONE woman have BOTH breast AND ovarian cancer No   Any woman with breast cancer before 50yrs No   2 or more relatives with breast AND/OR ovarian cancer Yes   2 or more relatives with breast AND/OR bowel cancer Yes           Mammogram Screening - Mammogram every 1-2 years updated in Health Maintenance based on mutual decision making    ASCVD Risk   The 10-year ASCVD risk score (Romeo BROTHERS, et al., 2019) is: 13%    Values used to calculate the score:      Age: 74 years      Sex: Female      Is Non- : No      Diabetic: No      Tobacco smoker: No      Systolic Blood Pressure: 121 mmHg      Is BP treated: Yes      HDL Cholesterol: 53 mg/dL      Total Cholesterol: 137 mg/dL            Reviewed and updated as needed this visit by Provider                    Past Medical History:   Diagnosis Date    Benign essential hypertension     CAD (coronary artery disease)     Hyperlipidemia      Past Surgical History:   Procedure Laterality Date     CV CORONARY ANGIOGRAM N/A 2024    Procedure: Coronary Angiogram;  Surgeon: Hiren Hwang MD;  Location:  [facility]    CV INTRAVASULAR ULTRASOUND N/A 2024    Procedure: Intravascular Ultrasound;  Surgeon: Hiren Hwang MD;  Location:  [facility]    CV PCI N/A 2024    Procedure: Percutaneous Coronary Intervention;  Surgeon: Hiren Hwang MD;  Location:  [facility]    LAPAROSCOPIC CHOLECYSTECTOMY       OB History    Para Term  AB Living   3 3 3 0 0 0   SAB IAB Ectopic Multiple Live Births   0 0 0 0 0      # Outcome Date GA Lbr Jono/2nd Weight Sex Type Anes PTL Lv   3 Term            2 Term            1 Term              BP Readings from Last 3 Encounters:   24 121/71   24 124/71   24 98/58    Wt Readings from Last 3 Encounters:   24 64 kg (141 lb)   24 62.3 kg (137 lb 6.4 oz)   24 62.6 kg (137 lb 14.4 oz)                  Patient Active Problem List   Diagnosis    HLD (hyperlipidemia)    Gastroesophageal reflux disease without esophagitis    Essential hypertension    Alcohol dependence, daily use (H)    Unstable angina (H)     Past Surgical History:   Procedure Laterality Date    CV CORONARY ANGIOGRAM N/A 2024    Procedure: Coronary Angiogram;  Surgeon: Hiren Hwang MD;  Location:  [facility]    CV INTRAVASULAR ULTRASOUND N/A 2024    Procedure: Intravascular Ultrasound;  Surgeon: Hiren Hwang MD;  Location:  [facility]    CV PCI N/A 2024    Procedure: Percutaneous Coronary Intervention;  Surgeon: Hiren Hwang MD;  Location:  [facility]    LAPAROSCOPIC CHOLECYSTECTOMY         Social History     Tobacco Use    Smoking status: Former     Current packs/day: 0.00     Average packs/day: 3.0 packs/day for 15.0 years (45.0 ttl pk-yrs)     Types: Cigarettes     Start date:      Quit date:      Years since quitting:  "39.7     Passive exposure: Never    Smokeless tobacco: Never   Substance Use Topics    Alcohol use: Not Currently     Alcohol/week: 2.0 standard drinks of alcohol     Types: 2 Glasses of wine per week     Family History   Problem Relation Age of Onset    Cerebrovascular Disease Mother     Cerebrovascular Disease Father     Hypertension Mother     Diabetes Type 2  Mother     Breast Cancer Maternal Aunt 67.00    Uterine Cancer Maternal Aunt     Uterine Cancer Maternal Grandmother          Current Outpatient Medications   Medication Sig Dispense Refill    aspirin 81 MG EC tablet Take 1 tablet (81 mg) by mouth daily Start tomorrow. 90 tablet 3    Biotin 800 MCG TABS Take 1 tablet by mouth daily      Calcium Carb-Cholecalciferol (CALCIUM+D3 PO)       COMIRNATY 30 MCG/0.3ML DIANE       lisinopril-hydrochlorothiazide (ZESTORETIC) 10-12.5 MG tablet Take 1 tablet by mouth daily 90 tablet 2    metoprolol succinate ER (TOPROL XL) 25 MG 24 hr tablet Take 1 tablet (25 mg) by mouth at bedtime      nitroGLYcerin (NITROSTAT) 0.4 MG sublingual tablet One tablet under the tongue every 5 minutes if needed for chest pain. May repeat every 5 minutes for a maximum of 3 doses in 15 minutes\" 25 tablet 3    omeprazole (PRILOSEC) 20 MG DR capsule Take 20 mg by mouth daily as needed (heartburn)      rosuvastatin (CRESTOR) 5 MG tablet Take 0.5 tablets (2.5 mg) by mouth every other day at bedtime 45 tablet 3    ticagrelor (BRILINTA) 90 MG tablet Take 1 tablet (90 mg) by mouth 2 times daily Dose to start tomorrow morning. 180 tablet 3    vitamin B-Complex Take 1 tablet by mouth daily       Allergies   Allergen Reactions    Ibuprofen Hives     \"I have taken Advil twice and both times noticed a rash on my neck.  I have not taken it in many years now.\"    Atorvastatin Muscle Pain (Myalgia)     Couldn't use right arm     Recent Labs   Lab Test 08/01/24  0654 07/31/24  0622 07/30/24  1759 07/17/24  0829 08/28/23  0822 07/25/22  0916 07/23/21  0958 " 07/23/21  0958 02/01/21  0857 11/02/20  1453 07/15/20  0932   A1C  --   --  5.6  --   --   --   --   --   --   --   --    LDL  --  75  --   --  106* 86  --  111   < >  --  177*   HDL  --  53  --   --  65 63  --  72   < >  --  60   TRIG  --  44  --   --  44 45  --  52   < >  --  101   ALT  --   --   --   --  19 23  --  22  --   --  19   CR 0.68 0.83 1.09*   < > 0.75 0.77  --  0.77  --  0.73 0.75   GFRESTIMATED >90 74 54*   < > 85 83  --  79   < > >60 >60   GFRESTBLACK  --   --   --   --   --   --   --   --   --  >60 >60   POTASSIUM 3.8 4.4 4.6  --  4.7 4.5   < > 4.4  --  3.5 4.8   TSH  --   --   --   --   --   --   --  2.58  --   --   --     < > = values in this interval not displayed.      Current providers sharing in care for this patient include:  Patient Care Team:  Michelle Hogue MD as PCP - General (Family Medicine)  Melody Oneal MD as MD (Neurology)  Michelle Hogue MD as Assigned PCP  Tab Copeland MD as MD (Cardiovascular Disease)  Geneva Mayers EP as Cardiac Rehabilitation Therapist  Zechariah Han NP as Assigned Heart and Vascular Provider    The following health maintenance items are reviewed in Epic and correct as of today:  Health Maintenance   Topic Date Due    RSV VACCINE (1 - Risk 60-74 years 1-dose series) Never done    ANNUAL REVIEW OF HM ORDERS  08/28/2024    INFLUENZA VACCINE (1) 09/01/2024    COVID-19 Vaccine (7 - 2023-24 season) 09/01/2024    MEDICARE ANNUAL WELLNESS VISIT  08/28/2024    LIPID  07/31/2025    MAMMO SCREENING  08/29/2025    FALL RISK ASSESSMENT  09/04/2025    COLORECTAL CANCER SCREENING  07/31/2027    GLUCOSE  08/01/2027    DTAP/TDAP/TD IMMUNIZATION (3 - Td or Tdap) 03/21/2028    ADVANCE CARE PLANNING  09/04/2029    DEXA  08/04/2036    HEPATITIS C SCREENING  Completed    PHQ-2 (once per calendar year)  Completed    Pneumococcal Vaccine: 65+ Years  Completed    ZOSTER IMMUNIZATION  Completed    HPV IMMUNIZATION  Aged Out  "   MENINGITIS IMMUNIZATION  Aged Out    RSV MONOCLONAL ANTIBODY  Aged Out       Review of Systems   Constitutional:  Negative for chills and fever.   HENT:  Negative for ear pain and sore throat.    Eyes:  Negative for pain and visual disturbance.   Respiratory:  Negative for cough and shortness of breath.    Cardiovascular:  Negative for chest pain and palpitations.   Gastrointestinal:  Negative for abdominal pain and vomiting.   Genitourinary:  Negative for dysuria and hematuria.   Musculoskeletal:  Negative for arthralgias and back pain.   Skin:  Negative for color change and rash.   Neurological:  Negative for seizures and syncope.   All other systems reviewed and are negative.         Objective    Exam  /71 (BP Location: Right arm, Patient Position: Sitting, Cuff Size: Adult Regular)   Pulse 63   Temp 97.3  F (36.3  C) (Tympanic)   Resp 16   Ht 1.651 m (5' 5\")   Wt 64 kg (141 lb)   LMP  (LMP Unknown)   SpO2 98%   BMI 23.46 kg/m     Estimated body mass index is 23.46 kg/m  as calculated from the following:    Height as of this encounter: 1.651 m (5' 5\").    Weight as of this encounter: 64 kg (141 lb).    Physical Exam  Vitals reviewed.   Constitutional:       General: She is not in acute distress.     Appearance: Normal appearance.   HENT:      Head: Normocephalic.      Right Ear: Tympanic membrane, ear canal and external ear normal.      Left Ear: Tympanic membrane, ear canal and external ear normal.      Nose: Nose normal.      Mouth/Throat:      Mouth: Mucous membranes are moist.      Pharynx: No posterior oropharyngeal erythema.   Eyes:      Extraocular Movements: Extraocular movements intact.      Conjunctiva/sclera: Conjunctivae normal.      Pupils: Pupils are equal, round, and reactive to light.   Cardiovascular:      Rate and Rhythm: Normal rate and regular rhythm.      Pulses: Normal pulses.      Heart sounds: Normal heart sounds. No murmur heard.  Pulmonary:      Effort: Pulmonary effort " is normal.      Breath sounds: Normal breath sounds.   Abdominal:      Palpations: Abdomen is soft. There is no mass.      Tenderness: There is no abdominal tenderness. There is no guarding or rebound.   Musculoskeletal:         General: No deformity. Normal range of motion.      Cervical back: Normal range of motion and neck supple.   Lymphadenopathy:      Cervical: No cervical adenopathy.   Skin:     General: Skin is warm and dry.   Neurological:      General: No focal deficit present.      Mental Status: She is alert.   Psychiatric:         Mood and Affect: Mood normal.         Behavior: Behavior normal.       No results found for any visits on 09/04/24.          9/4/2024   Mini Cog   Clock Draw Score 2 Normal   3 Item Recall 3 objects recalled   Mini Cog Total Score 5            Vision Screen  Reason Vision Screen Not Completed: Parent/Patient declined - Had recent screening      Signed Electronically by: JEREMIAH CHAUHAN MD

## 2024-09-05 ENCOUNTER — HOSPITAL ENCOUNTER (OUTPATIENT)
Dept: CARDIAC REHAB | Facility: CLINIC | Age: 74
Discharge: HOME | End: 2024-09-05
Attending: INTERNAL MEDICINE
Payer: COMMERCIAL

## 2024-09-05 PROCEDURE — 93798 PHYS/QHP OP CAR RHAB W/ECG: CPT

## 2024-09-06 ENCOUNTER — HOSPITAL ENCOUNTER (OUTPATIENT)
Dept: CARDIAC REHAB | Facility: CLINIC | Age: 74
Discharge: HOME | End: 2024-09-06
Attending: INTERNAL MEDICINE
Payer: COMMERCIAL

## 2024-09-06 PROCEDURE — 93798 PHYS/QHP OP CAR RHAB W/ECG: CPT

## 2024-09-09 ENCOUNTER — HOSPITAL ENCOUNTER (OUTPATIENT)
Dept: CARDIAC REHAB | Facility: CLINIC | Age: 74
Discharge: HOME | End: 2024-09-09
Attending: INTERNAL MEDICINE
Payer: COMMERCIAL

## 2024-09-09 PROCEDURE — 93798 PHYS/QHP OP CAR RHAB W/ECG: CPT | Performed by: REHABILITATION PRACTITIONER

## 2024-09-10 ENCOUNTER — HOSPITAL ENCOUNTER (OUTPATIENT)
Dept: CARDIAC REHAB | Facility: CLINIC | Age: 74
Discharge: HOME | End: 2024-09-10
Attending: INTERNAL MEDICINE
Payer: COMMERCIAL

## 2024-09-10 PROCEDURE — 93798 PHYS/QHP OP CAR RHAB W/ECG: CPT

## 2024-09-11 ENCOUNTER — TRANSFERRED RECORDS (OUTPATIENT)
Dept: HEALTH INFORMATION MANAGEMENT | Facility: CLINIC | Age: 74
End: 2024-09-11
Payer: COMMERCIAL

## 2024-09-12 ENCOUNTER — HOSPITAL ENCOUNTER (OUTPATIENT)
Dept: CARDIAC REHAB | Facility: CLINIC | Age: 74
Discharge: HOME | End: 2024-09-12
Attending: INTERNAL MEDICINE
Payer: COMMERCIAL

## 2024-09-12 PROCEDURE — 93798 PHYS/QHP OP CAR RHAB W/ECG: CPT

## 2024-09-13 ENCOUNTER — HOSPITAL ENCOUNTER (OUTPATIENT)
Dept: CARDIAC REHAB | Facility: CLINIC | Age: 74
Discharge: HOME | End: 2024-09-13
Attending: INTERNAL MEDICINE
Payer: COMMERCIAL

## 2024-09-13 PROCEDURE — 93798 PHYS/QHP OP CAR RHAB W/ECG: CPT

## 2024-09-15 ASSESSMENT — ENCOUNTER SYMPTOMS
SEIZURES: 0
BACK PAIN: 0
SORE THROAT: 0
COLOR CHANGE: 0
CHILLS: 0
ABDOMINAL PAIN: 0
COUGH: 0
PALPITATIONS: 0
EYE PAIN: 0
FEVER: 0
SHORTNESS OF BREATH: 0
ARTHRALGIAS: 0
HEMATURIA: 0
DYSURIA: 0
VOMITING: 0

## 2024-09-16 ENCOUNTER — HOSPITAL ENCOUNTER (OUTPATIENT)
Dept: CARDIAC REHAB | Facility: CLINIC | Age: 74
Discharge: HOME | End: 2024-09-16
Attending: INTERNAL MEDICINE
Payer: COMMERCIAL

## 2024-09-16 PROCEDURE — 93798 PHYS/QHP OP CAR RHAB W/ECG: CPT

## 2024-09-17 ENCOUNTER — HOSPITAL ENCOUNTER (OUTPATIENT)
Dept: CARDIAC REHAB | Facility: CLINIC | Age: 74
Discharge: HOME | End: 2024-09-17
Attending: INTERNAL MEDICINE
Payer: COMMERCIAL

## 2024-09-17 PROCEDURE — 93798 PHYS/QHP OP CAR RHAB W/ECG: CPT

## 2024-09-18 ENCOUNTER — HOSPITAL ENCOUNTER (OUTPATIENT)
Dept: CARDIAC REHAB | Facility: CLINIC | Age: 74
Discharge: HOME | End: 2024-09-18
Attending: INTERNAL MEDICINE
Payer: COMMERCIAL

## 2024-09-18 PROCEDURE — 93798 PHYS/QHP OP CAR RHAB W/ECG: CPT | Performed by: REHABILITATION PRACTITIONER

## 2024-09-19 ENCOUNTER — HOSPITAL ENCOUNTER (OUTPATIENT)
Dept: CARDIAC REHAB | Facility: CLINIC | Age: 74
Discharge: HOME | End: 2024-09-19
Attending: INTERNAL MEDICINE
Payer: COMMERCIAL

## 2024-09-19 PROCEDURE — 93798 PHYS/QHP OP CAR RHAB W/ECG: CPT

## 2024-10-22 ENCOUNTER — IMMUNIZATION (OUTPATIENT)
Dept: PEDIATRICS | Facility: CLINIC | Age: 74
End: 2024-10-22
Payer: COMMERCIAL

## 2024-10-22 DIAGNOSIS — Z23 NEED FOR PROPHYLACTIC VACCINATION AND INOCULATION AGAINST INFLUENZA: Primary | ICD-10-CM

## 2024-10-22 PROCEDURE — 90662 IIV NO PRSV INCREASED AG IM: CPT

## 2024-10-22 PROCEDURE — 99207 PR NO CHARGE NURSE ONLY: CPT

## 2024-10-22 PROCEDURE — 90471 IMMUNIZATION ADMIN: CPT

## 2024-11-01 ENCOUNTER — MYC MEDICAL ADVICE (OUTPATIENT)
Dept: CARDIOLOGY | Facility: CLINIC | Age: 74
End: 2024-11-01
Payer: COMMERCIAL

## 2024-11-01 ENCOUNTER — TELEPHONE (OUTPATIENT)
Dept: CARDIOLOGY | Facility: CLINIC | Age: 74
End: 2024-11-01
Payer: COMMERCIAL

## 2024-11-01 DIAGNOSIS — I20.0 UNSTABLE ANGINA (H): ICD-10-CM

## 2024-11-01 NOTE — TELEPHONE ENCOUNTER
Refill sent for Brilinta 90mg, twice daily, [facility] on [address]  in [place] .

## 2024-11-01 NOTE — TELEPHONE ENCOUNTER
[facility] Call Center    Phone Message    May a detailed message be left on voicemail: no     Reason for Call: Medication Question or concern regarding medication   Prescription Clarification  Name of Medication: ticagrelor (BRILINTA) 90 MG tablet   Prescribing Provider: Zecharaih Han   Pharmacy: [facility] in [place]   What on the order needs clarification? Pt stated she needs Rx- will be out tomorrow.  Please send to [facility].      Action Taken: Other: cardio    Travel Screening: Not Applicable     Date of Service:

## 2024-11-04 NOTE — PROGRESS NOTES
Assessment & Plan  1. Cough  Offered a CXR, though unable to do in clinic at the time and the patient declined.   Discussed that this may be due to a viral infection that is causing wheeze.  Discussed boosting immune system with rest and hydration.  Because it has been going on for 2 weeks without improvement, I did prescribe azithromycin that she could start if she has no improvement in the next couple days or if she gets worse over the weekend.  She will alert the clinic if she does need to take it.  - azithromycin (ZITHROMAX) 250 MG tablet; Take 500mg (2 tablets) day one, and then 250mg (1 tablet) days 2-5  Dispense: 6 tablet; Refill: 0    2. Wheeze    Maegan Jackson MD    Subjective  Chief Complaint:  Cough    HPI:   Geneva Bond is a 69 y.o. female who presents for cough.  It has been present for the last 2 weeks.  No preceding symptoms of runny nose, congestion.  No fevers or chills.  Brother was sick prior.  She has not had any improvement over the last 2 weeks. No history of COPD or asthma. Use of tobacco only when she was in her 20s.  Did receive the pneumonia vaccine. Is on lisinopril, but not a new medication (on it for 8 years)    Allergies:  is allergic to advil [ibuprofen] and atorvastatin.    SH/FH:  Social History and Family History reviewed and updated.   Tobacco Status:  She  reports that she quit smoking about 31 years ago. She has a 51.00 pack-year smoking history. She has never used smokeless tobacco.    Review of Systems:    Constitutional: fatigue, No Fever, No Chills, No Night Sweats,   ENT/Mouth: No sore throat, No Rhinorrhea  Cardiovascular: No Chest Pain  Respiratory: Cough, No Dyspnea   Gastrointestinal: No Nausea, No Vomiting  Skin: No Skin Lesions    Objective  Vitals:    09/13/19 1604   BP: 126/60   Pulse: 74   Temp: 98.7  F (37.1  C)   SpO2: 98%   Weight: 151 lb (68.5 kg)       Physical Exam:  GENERAL: Alert, well-appearing, in no acute distress.  Occasional coughing  PSYCH:  Pleasant mood, affect appropriate.  Good eye contact.  SKIN: No apparent lesions  EYES: Conjunctiva pink, sclera white  NOSE:  Normal nasal mucosa, septum and turbinates.  MOUTH: Pharynx moist, pink without exudate. No tonsillar enlargement  NECK: No lymphadenopathy.   CV: Regular rate and rhythm without murmurs, rubs or gallops. No peripheral edema  RESP: No increased work of breathing.  Lung sounds bilateral.  At the lung base on the left there is scattered expiratory wheeze.   No crackles.     ideal

## 2024-11-13 ENCOUNTER — LAB (OUTPATIENT)
Dept: LAB | Facility: CLINIC | Age: 74
End: 2024-11-13
Payer: COMMERCIAL

## 2024-11-13 DIAGNOSIS — I20.0 UNSTABLE ANGINA (H): ICD-10-CM

## 2024-11-13 DIAGNOSIS — I25.119 CORONARY ARTERY DISEASE INVOLVING NATIVE CORONARY ARTERY OF NATIVE HEART WITH ANGINA PECTORIS (H): ICD-10-CM

## 2024-11-13 LAB
ALT SERPL W P-5'-P-CCNC: 20 U/L (ref 0–50)
CHOLEST SERPL-MCNC: 184 MG/DL
FASTING STATUS PATIENT QL REPORTED: YES
HDLC SERPL-MCNC: 72 MG/DL
LDLC SERPL CALC-MCNC: 93 MG/DL
NONHDLC SERPL-MCNC: 112 MG/DL
TRIGL SERPL-MCNC: 95 MG/DL

## 2024-11-13 PROCEDURE — 84460 ALANINE AMINO (ALT) (SGPT): CPT

## 2024-11-13 PROCEDURE — 36415 COLL VENOUS BLD VENIPUNCTURE: CPT

## 2024-11-13 PROCEDURE — 80061 LIPID PANEL: CPT

## 2024-11-15 ENCOUNTER — OFFICE VISIT (OUTPATIENT)
Dept: CARDIOLOGY | Facility: CLINIC | Age: 74
End: 2024-11-15
Attending: NURSE PRACTITIONER
Payer: COMMERCIAL

## 2024-11-15 VITALS
SYSTOLIC BLOOD PRESSURE: 122 MMHG | WEIGHT: 145.1 LBS | DIASTOLIC BLOOD PRESSURE: 70 MMHG | HEIGHT: 65 IN | HEART RATE: 65 BPM | OXYGEN SATURATION: 98 % | BODY MASS INDEX: 24.18 KG/M2

## 2024-11-15 DIAGNOSIS — E78.2 MIXED HYPERLIPIDEMIA: ICD-10-CM

## 2024-11-15 DIAGNOSIS — I10 ESSENTIAL HYPERTENSION: ICD-10-CM

## 2024-11-15 DIAGNOSIS — I25.10 CORONARY ARTERY DISEASE INVOLVING NATIVE CORONARY ARTERY OF NATIVE HEART WITHOUT ANGINA PECTORIS: Primary | ICD-10-CM

## 2024-11-15 RX ORDER — LISINOPRIL 10 MG/1
10 TABLET ORAL DAILY
Qty: 90 TABLET | Refills: 3 | Status: SHIPPED | OUTPATIENT
Start: 2024-11-15

## 2024-11-15 RX ORDER — EZETIMIBE 10 MG/1
10 TABLET ORAL DAILY
Qty: 90 TABLET | Refills: 3 | Status: SHIPPED | OUTPATIENT
Start: 2024-11-15

## 2024-11-15 NOTE — LETTER
11/15/2024    JEREMIAH CHAUHAN MD  [address] [place] [id number]    RE: Geneva Bond       Dear Colleague,     I had the pleasure of seeing Geneva Bond in the [facility].  CARDIOLOGY CLINIC FOLLOW-UP NOTE      REASON FOR VISIT:   CAD s/p LAD PCI in 3/2024    PRIMARY CARE PHYSICIAN:  JEREMIAH CHAUHAN        History of Present Illness  Geneva Bond is an extremely pleasant 74 year old woman here for routine follow-up.  PMH is notable for CAD s/p proximal LAD PCI in 3/2024, HTN and HLD, with severe myalgias to atorvastatin.  No FH of CAD or other heart disease.  Former tobacco abuse (quit ~ 2004).  Drinks 1-2 glasses of wine and 1-2 cups of coffee per day.    I first met her in July 2024 for evaluation of new onset exertional chest discomfort.  I had ordered a coronary CTA which she did on 7/17/2024, and this was suggestive of a subtotally occluded mid LAD as well as moderate to severe proximal LCx stenosis.  Given this, she underwent coronary angiography on 7/31/2024, and I personally reviewed these films.  This showed severe proximal LAD stenosis which was successfully stented by Dr. Hwang with a single ADALID with excellent result.  Since her stenting procedure, she has had no residual anginal symptoms.  She was started on very low-dose rosuvastatin 2.5 mg every other day and has been tolerating this without myalgia.  She is having some nosebleeds on aspirin/Brilinta.  She also is frustrated that she is urinating too much on the lisinopril-HCTZ combo pill.    As part of today's visit, I reviewed her most recent lipid panel, chemistry panel, CBC, coronary CTA, and the images of her coronary angiogram/PCI from 7/2024 described above.      Assessment & Plan    CAD s/p proximal LAD PCI in 3/2024, currently CCS 0 angina  HTN, well-controlled  HLD, with suboptimal control  Severe myalgias to atorvastatin, tolerating minimal dose rosuvastatin (2.5 mg every other  day)  Former tobacco abuse      It was a pleasure to speak with Geneva again in clinic today.  I am glad to hear that she is feeling better following her PCI, and on my review of the images she got a truly excellent stent result which I am happy to see.  We talked about the importance of secondary prevention in her case.  While her lipid numbers are not terrible, they are certainly higher than we would like to see for her, and I do not think that she will tolerate significant statin increases.  Accordingly, we talked about statin alternatives, including either ezetimibe or PCSK9 inhibitors.  While she would most prefer not to add any additional medication, she is open to trying the Zetia.  As for her increased diuresis, we will switch her lisinopril-HCTZ over to lisinopril alone.  She will log her blood pressures and bring a log into her PCP at the next visit.  Finally, given the nosebleeds, I discussed DAPT with her.  Ultimately, I would like to see her get in 6 months of DAPT and then continue on Brilinta monotherapy indefinitely afterwards.  However, I told her that if her nosebleeds become significantly more severe, she is now over 3 months out from stenting, so I think she could discontinue the baby aspirin if needed.      -Start Zetia 10 mg daily  -Continue Crestor 2.5 mg every other day  -Repeat lipid panel in 3 months, with further adjustment as needed  -Change lisinopril-HCTZ 10-12.5 mg daily to lisinopril 10 mg daily  -Patient to bring home BP log in to next PCP appointment for further adjustment as needed  -Ideally continue DAPT until end of January 2025, followed by Brilinta monotherapy afterwards (with caveats as above)      Follow-up: 3 months with [facility], with labs beforehand, or sooner as needed        On the date of the patient's visit, I spent a total of 47 minutes reviewing the patient's chart; interviewing, examining, and counseling the patient; coordinating with other providers as necessary,  "entering orders, and documenting in the medical chart.          Tab Copeland MD  Interventional Cardiology  November 15, 2024      The longitudinal plan of care for the diagnosis(es)/condition(s) as documented were addressed during this visit. Due to the added complexity in care, I will continue to support Geneva in the subsequent management and with ongoing continuity of care.            Medications  Current Outpatient Medications   Medication Sig Dispense Refill     aspirin 81 MG EC tablet Take 1 tablet (81 mg) by mouth daily Start tomorrow. 90 tablet 3     Biotin 800 MCG TABS Take 1 tablet by mouth daily       Calcium Carb-Cholecalciferol (CALCIUM+D3 PO)        COMIRNATY 30 MCG/0.3ML DIANE        lisinopril-hydrochlorothiazide (ZESTORETIC) 10-12.5 MG tablet Take 1 tablet by mouth daily 90 tablet 2     metoprolol succinate ER (TOPROL XL) 25 MG 24 hr tablet Take 1 tablet (25 mg) by mouth at bedtime       nitroGLYcerin (NITROSTAT) 0.4 MG sublingual tablet One tablet under the tongue every 5 minutes if needed for chest pain. May repeat every 5 minutes for a maximum of 3 doses in 15 minutes\" 25 tablet 3     omeprazole (PRILOSEC) 20 MG DR capsule Take 20 mg by mouth daily as needed (heartburn)       rosuvastatin (CRESTOR) 5 MG tablet Take 0.5 tablets (2.5 mg) by mouth every other day at bedtime 45 tablet 3     ticagrelor (BRILINTA) 90 MG tablet Take 1 tablet (90 mg) by mouth 2 times daily. Dose to start tomorrow morning. 180 tablet 3     vitamin B-Complex Take 1 tablet by mouth daily       No current facility-administered medications for this visit.     Allergies  Allergies   Allergen Reactions     Ibuprofen Hives     \"I have taken Advil twice and both times noticed a rash on my neck.  I have not taken it in many years now.\"     Atorvastatin Muscle Pain (Myalgia)     Couldn't use right arm         Physical Exam      BP: 122/70 Pulse: 65     SpO2: 98 %      Vital Signs with Ranges  Pulse:  [65] 65  BP: (122)/(70) " 122/70  SpO2:  [98 %] 98 %  145 lbs 1.6 oz    Constitutional:  Well appearing, NAD  Respiratory: Normal respiratory effort, CTAB  Cardiovascular: RRR, no m/r/g.  JVP < 7 cm H2O.  There is no LE edema.  Normal carotid upstrokes, no carotid bruits.      Thank you for allowing me to participate in the care of your patient.      Sincerely,     Tab Copeland MD     [facility]  cc:   Zechariah Han NP  [address] [id number]

## 2024-11-15 NOTE — PROGRESS NOTES
CARDIOLOGY CLINIC FOLLOW-UP NOTE      REASON FOR VISIT:   CAD s/p LAD PCI in 3/2024    PRIMARY CARE PHYSICIAN:  JEREMIAH CHAUHAN        History of Present Illness   Geneva Bond is an extremely pleasant 74 year old woman here for routine follow-up.  PMH is notable for CAD s/p proximal LAD PCI in 3/2024, HTN and HLD, with severe myalgias to atorvastatin.  No FH of CAD or other heart disease.  Former tobacco abuse (quit ~ 2004).  Drinks 1-2 glasses of wine and 1-2 cups of coffee per day.    I first met her in July 2024 for evaluation of new onset exertional chest discomfort.  I had ordered a coronary CTA which she did on 7/17/2024, and this was suggestive of a subtotally occluded mid LAD as well as moderate to severe proximal LCx stenosis.  Given this, she underwent coronary angiography on 7/31/2024, and I personally reviewed these films.  This showed severe proximal LAD stenosis which was successfully stented by Dr. Hwang with a single ADALID with excellent result.  Since her stenting procedure, she has had no residual anginal symptoms.  She was started on very low-dose rosuvastatin 2.5 mg every other day and has been tolerating this without myalgia.  She is having some nosebleeds on aspirin/Brilinta.  She also is frustrated that she is urinating too much on the lisinopril-HCTZ combo pill.    As part of today's visit, I reviewed her most recent lipid panel, chemistry panel, CBC, coronary CTA, and the images of her coronary angiogram/PCI from 7/2024 described above.      Assessment & Plan     CAD s/p proximal LAD PCI in 3/2024, currently CCS 0 angina  HTN, well-controlled  HLD, with suboptimal control  Severe myalgias to atorvastatin, tolerating minimal dose rosuvastatin (2.5 mg every other day)  Former tobacco abuse      It was a pleasure to speak with Geneva again in clinic today.  I am glad to hear that she is feeling better following her PCI, and on my review of the images she got a truly excellent  stent result which I am happy to see.  We talked about the importance of secondary prevention in her case.  While her lipid numbers are not terrible, they are certainly higher than we would like to see for her, and I do not think that she will tolerate significant statin increases.  Accordingly, we talked about statin alternatives, including either ezetimibe or PCSK9 inhibitors.  While she would most prefer not to add any additional medication, she is open to trying the Zetia.  As for her increased diuresis, we will switch her lisinopril-HCTZ over to lisinopril alone.  She will log her blood pressures and bring a log into her PCP at the next visit.  Finally, given the nosebleeds, I discussed DAPT with her.  Ultimately, I would like to see her get in 6 months of DAPT and then continue on Brilinta monotherapy indefinitely afterwards.  However, I told her that if her nosebleeds become significantly more severe, she is now over 3 months out from stenting, so I think she could discontinue the baby aspirin if needed.      -Start Zetia 10 mg daily  -Continue Crestor 2.5 mg every other day  -Repeat lipid panel in 3 months, with further adjustment as needed  -Change lisinopril-HCTZ 10-12.5 mg daily to lisinopril 10 mg daily  -Patient to bring home BP log in to next PCP appointment for further adjustment as needed  -Ideally continue DAPT until end of January 2025, followed by Brilinta monotherapy afterwards (with caveats as above)      Follow-up: 3 months with [facility], with labs beforehand, or sooner as needed        On the date of the patient's visit, I spent a total of 47 minutes reviewing the patient's chart; interviewing, examining, and counseling the patient; coordinating with other providers as necessary, entering orders, and documenting in the medical chart.          Tab Copeland MD  Interventional Cardiology  November 15, 2024      The longitudinal plan of care for the diagnosis(es)/condition(s) as documented were  "addressed during this visit. Due to the added complexity in care, I will continue to support Geneva in the subsequent management and with ongoing continuity of care.            Medications   Current Outpatient Medications   Medication Sig Dispense Refill    aspirin 81 MG EC tablet Take 1 tablet (81 mg) by mouth daily Start tomorrow. 90 tablet 3    Biotin 800 MCG TABS Take 1 tablet by mouth daily      Calcium Carb-Cholecalciferol (CALCIUM+D3 PO)       COMIRNATY 30 MCG/0.3ML DIANE       lisinopril-hydrochlorothiazide (ZESTORETIC) 10-12.5 MG tablet Take 1 tablet by mouth daily 90 tablet 2    metoprolol succinate ER (TOPROL XL) 25 MG 24 hr tablet Take 1 tablet (25 mg) by mouth at bedtime      nitroGLYcerin (NITROSTAT) 0.4 MG sublingual tablet One tablet under the tongue every 5 minutes if needed for chest pain. May repeat every 5 minutes for a maximum of 3 doses in 15 minutes\" 25 tablet 3    omeprazole (PRILOSEC) 20 MG DR capsule Take 20 mg by mouth daily as needed (heartburn)      rosuvastatin (CRESTOR) 5 MG tablet Take 0.5 tablets (2.5 mg) by mouth every other day at bedtime 45 tablet 3    ticagrelor (BRILINTA) 90 MG tablet Take 1 tablet (90 mg) by mouth 2 times daily. Dose to start tomorrow morning. 180 tablet 3    vitamin B-Complex Take 1 tablet by mouth daily       No current facility-administered medications for this visit.     Allergies   Allergies   Allergen Reactions    Ibuprofen Hives     \"I have taken Advil twice and both times noticed a rash on my neck.  I have not taken it in many years now.\"    Atorvastatin Muscle Pain (Myalgia)     Couldn't use right arm         Physical Exam       BP: 122/70 Pulse: 65     SpO2: 98 %      Vital Signs with Ranges  Pulse:  [65] 65  BP: (122)/(70) 122/70  SpO2:  [98 %] 98 %  145 lbs 1.6 oz    Constitutional:  Well appearing, NAD  Respiratory: Normal respiratory effort, CTAB  Cardiovascular: RRR, no m/r/g.  JVP < 7 cm H2O.  There is no LE edema.  Normal carotid upstrokes, no " carotid bruits.

## 2024-11-15 NOTE — PATIENT INSTRUCTIONS
Medication Changes:    - We will change your lisinopril - hydrochlorothiazide combo pill to a lisinopril pill alone.  - Please log your BP on your home cuff and bring that to your next PCP appointment    - We will add a new medicine (ezetimibe or Zetia):  You will take this as 1 tablet, once per day.    - Lastly, in regards to your aspirin, ideally we will have you take this until the end of January.  However, if your nosebleeds worsen, you can stop it beforehand.  Make sure to continue the Brilinta.

## 2025-01-13 ENCOUNTER — MYC REFILL (OUTPATIENT)
Dept: FAMILY MEDICINE | Facility: CLINIC | Age: 75
End: 2025-01-13
Payer: COMMERCIAL

## 2025-01-13 ENCOUNTER — MYC REFILL (OUTPATIENT)
Dept: CARDIOLOGY | Facility: CLINIC | Age: 75
End: 2025-01-13
Payer: COMMERCIAL

## 2025-01-13 DIAGNOSIS — I25.119 CORONARY ARTERY DISEASE INVOLVING NATIVE CORONARY ARTERY OF NATIVE HEART WITH ANGINA PECTORIS: ICD-10-CM

## 2025-01-13 DIAGNOSIS — I20.0 UNSTABLE ANGINA (H): ICD-10-CM

## 2025-01-13 RX ORDER — ROSUVASTATIN CALCIUM 5 MG/1
2.5 TABLET, COATED ORAL EVERY OTHER DAY
Qty: 45 TABLET | Refills: 3 | OUTPATIENT
Start: 2025-01-13

## 2025-01-15 NOTE — TELEPHONE ENCOUNTER
Looks like metoprolol is managed by cardiology. Routing request to appropriate pool.    Melody Akhtar RN  [facility]

## 2025-01-16 RX ORDER — METOPROLOL SUCCINATE 25 MG/1
25 TABLET, EXTENDED RELEASE ORAL AT BEDTIME
Qty: 90 TABLET | Refills: 0 | Status: SHIPPED | OUTPATIENT
Start: 2025-01-16

## 2025-02-12 ENCOUNTER — TRANSFERRED RECORDS (OUTPATIENT)
Dept: HEALTH INFORMATION MANAGEMENT | Facility: CLINIC | Age: 75
End: 2025-02-12
Payer: COMMERCIAL

## 2025-02-17 ENCOUNTER — LAB (OUTPATIENT)
Dept: LAB | Facility: CLINIC | Age: 75
End: 2025-02-17
Payer: MEDICARE

## 2025-02-17 DIAGNOSIS — E78.2 MIXED HYPERLIPIDEMIA: ICD-10-CM

## 2025-02-17 DIAGNOSIS — I10 ESSENTIAL HYPERTENSION: ICD-10-CM

## 2025-02-17 DIAGNOSIS — I25.10 CORONARY ARTERY DISEASE INVOLVING NATIVE CORONARY ARTERY OF NATIVE HEART WITHOUT ANGINA PECTORIS: ICD-10-CM

## 2025-02-17 LAB
ALT SERPL W P-5'-P-CCNC: 22 U/L (ref 0–50)
ANION GAP SERPL CALCULATED.3IONS-SCNC: 11 MMOL/L (ref 7–15)
BUN SERPL-MCNC: 19.8 MG/DL (ref 8–23)
CALCIUM SERPL-MCNC: 9.7 MG/DL (ref 8.8–10.4)
CHLORIDE SERPL-SCNC: 105 MMOL/L (ref 98–107)
CHOLEST SERPL-MCNC: 164 MG/DL
CREAT SERPL-MCNC: 0.7 MG/DL (ref 0.51–0.95)
EGFRCR SERPLBLD CKD-EPI 2021: >90 ML/MIN/1.73M2
ERYTHROCYTE [DISTWIDTH] IN BLOOD BY AUTOMATED COUNT: 15.3 % (ref 10–15)
FASTING STATUS PATIENT QL REPORTED: YES
FASTING STATUS PATIENT QL REPORTED: YES
GLUCOSE SERPL-MCNC: 99 MG/DL (ref 70–99)
HCO3 SERPL-SCNC: 25 MMOL/L (ref 22–29)
HCT VFR BLD AUTO: 41 % (ref 35–47)
HDLC SERPL-MCNC: 65 MG/DL
HGB BLD-MCNC: 13.5 G/DL (ref 11.7–15.7)
LDLC SERPL CALC-MCNC: 83 MG/DL
MCH RBC QN AUTO: 27.4 PG (ref 26.5–33)
MCHC RBC AUTO-ENTMCNC: 32.9 G/DL (ref 31.5–36.5)
MCV RBC AUTO: 83 FL (ref 78–100)
NONHDLC SERPL-MCNC: 99 MG/DL
PLATELET # BLD AUTO: 264 10E3/UL (ref 150–450)
POTASSIUM SERPL-SCNC: 4.7 MMOL/L (ref 3.4–5.3)
RBC # BLD AUTO: 4.92 10E6/UL (ref 3.8–5.2)
SODIUM SERPL-SCNC: 141 MMOL/L (ref 135–145)
TRIGL SERPL-MCNC: 80 MG/DL
WBC # BLD AUTO: 5.1 10E3/UL (ref 4–11)

## 2025-02-17 PROCEDURE — 80048 BASIC METABOLIC PNL TOTAL CA: CPT

## 2025-02-17 PROCEDURE — 85027 COMPLETE CBC AUTOMATED: CPT

## 2025-02-17 PROCEDURE — 80061 LIPID PANEL: CPT

## 2025-02-17 PROCEDURE — 36415 COLL VENOUS BLD VENIPUNCTURE: CPT

## 2025-02-17 PROCEDURE — 84460 ALANINE AMINO (ALT) (SGPT): CPT

## 2025-02-19 ENCOUNTER — OFFICE VISIT (OUTPATIENT)
Dept: CARDIOLOGY | Facility: CLINIC | Age: 75
End: 2025-02-19
Payer: MEDICARE

## 2025-02-19 VITALS
WEIGHT: 145.8 LBS | HEART RATE: 73 BPM | HEIGHT: 64 IN | BODY MASS INDEX: 24.89 KG/M2 | SYSTOLIC BLOOD PRESSURE: 142 MMHG | OXYGEN SATURATION: 96 % | DIASTOLIC BLOOD PRESSURE: 68 MMHG

## 2025-02-19 DIAGNOSIS — I25.119 CORONARY ARTERY DISEASE INVOLVING NATIVE CORONARY ARTERY OF NATIVE HEART WITH ANGINA PECTORIS: ICD-10-CM

## 2025-02-19 DIAGNOSIS — I25.10 CORONARY ARTERY DISEASE INVOLVING NATIVE CORONARY ARTERY OF NATIVE HEART WITHOUT ANGINA PECTORIS: ICD-10-CM

## 2025-02-19 DIAGNOSIS — E78.2 MIXED HYPERLIPIDEMIA: ICD-10-CM

## 2025-02-19 DIAGNOSIS — I20.0 UNSTABLE ANGINA (H): ICD-10-CM

## 2025-02-19 DIAGNOSIS — I10 ESSENTIAL HYPERTENSION: ICD-10-CM

## 2025-02-19 RX ORDER — LISINOPRIL 20 MG/1
20 TABLET ORAL DAILY
Qty: 90 TABLET | Refills: 3 | Status: SHIPPED | OUTPATIENT
Start: 2025-02-19

## 2025-02-19 RX ORDER — ROSUVASTATIN CALCIUM 5 MG/1
TABLET, COATED ORAL
Qty: 90 TABLET | Refills: 3 | Status: SHIPPED | OUTPATIENT
Start: 2025-02-19

## 2025-02-19 NOTE — PATIENT INSTRUCTIONS
Thank you for your visit with the [facility] today.    Today's plan:   - Increase lisinopril from 10 mg to 20 mg once daily.  - Increase rosuvastatin (Crestor) from half tablet (2.5 mg) every other day up to daily. Try this for 2 weeks, and then if you're tolerating it okay increase to 1 full tablet (5 mg) once daily. We can recheck fasting labs for cholesterol levels in about 3 months after making this adjustment.  - Continue to monitor your blood pressure regularly at home shooting for readings more consistently around 120/80 range. Contact us if it's continuing to run above this range after increasing lisinopril and we can see if we need to make any further adjustments.  - Check non-fasting labs in about 1 week after increasing lisinopril.   - Follow up with Dr. Copeland around the end of July.     If you have questions or concerns please call the nurse team at [phone] or send a Vid[employer] message.     Scheduling phone number: [phone]    It was a pleasure seeing you today!     KENDRA Bond, CNP  Nurse Practitioner  [facility]

## 2025-02-19 NOTE — PROGRESS NOTES
Cardiology Clinic Progress Note    Service Date: February 19, 2025  Primary Cardiology Team: Dr. Copeland    HISTORY OF PRESENT ILLNESS:  I had the pleasure of meeting Ms. Geneva Bond in the clinic today. She is a very pleasant 74 year old female with a past medical history notable for hypertension, hyperlipidemia with myalgias on atorvastatin, former smoking, GERD, and coronary artery disease.  She recently presented to [facility] on 7/30/24 with symptoms of chest pain concerning for unstable angina in the setting of her recent coronary CTA which showed significant CAD with a calcium score of 499 putting her in the 90th percentile with a subtotal occlusion of the mid LAD.  She was sent for invasive coronary angiography on 7/31/24 and underwent complex PCI of the LAD and second diagonal with kissing balloon technique and placement of a drug-eluting stent from the proximal pole to mid LAD with dilation of the side cell of the LAD stent into the ostium of the second diagonal.  The second diagonal was not stented since it was small at less than 2 mm and had ALVIN-3 flow.  Given the history of severe myalgias on atorvastatin, trial of rosuvastatin at a very low-dose of 2.5 mg every other day was suggested.  She was otherwise discharged on aspirin 81 mg daily and ticagrelor 90 mg twice daily.    She last met with Dr. Copeland in the clinic in follow up in November 2024 and was doing well overall from a cardiac standpoint at that time. She had concerns regarding frequent urination on the combination of lisinopril-hydrochlorothiazide so this was switched to lisinopril alone at 10 mg once daily. Zetia was also added at 10 mg once daily for better management of her cholesterol levels.     Labs were rechecked earlier this week on 2/17/2025 with a fasting lipid profile showing total cholesterol 164, HDL at 65, LDL at 83, and triglycerides at 80.  ALT level  is within normal limits at 22.  Based  metabolic panel shows stable electrolytes and renal function and a CBC was also checked and is unremarkable with no anemia.    Today, Ms. Bond presents to the clinic in follow up of of her medication changes.  She states she has been feeling better with stopping the hydrochlorothiazide with resolution of the issues with urinary frequency.  However, she brings in a log of her recent blood pressure readings from home and notes that they have been running quite a bit higher since stopping the HCTZ with readings primarily around the 140s systolic range.  She otherwise notes she has been tolerating Zetia well and denies concerns as far as symptoms from a cardiac standpoint.  She has been going to the gym several times per week and has been tolerating her usual activity level and exercises well without exertional symptoms or limitations.    ASSESSMENT:  1.  Coronary artery disease s/p complex PCI of the LAD and second diagonal with ADALID x 1 on 7/31/24. Stable without angina.  2.  Hyperlipidemia. History of myalgias on atorvastatin. Currently on rosuvastatin 2.5 mg every other day and Zetia 10 mg daily with LDL at 83.  3.  Hypertension. Blood pressure sub-optimally controlled on lisinopril 10 mg once daily and metoprolol succinate 25 mg daily.   4.  GERD. Managed with intermittent omeprazole use.    PLAN:  - Increase lisinopril from 10 mg to 20 mg once daily for better blood pressure control shooting for readings more consistently around 120/80 range.   - Repeat a BMP in 1-2 weeks after increasing lisinopril.  - Recommend trial of increasing rosuvastatin from 2.5 mg every other day up to daily for 2 weeks and then if she is tolerating this okay I instructed her to increase this to 5 mg once daily. We will recheck a fasting lipid profile ALT in about 2-3 months. If LDL remains above 70 despite trial of gradually increasing rosuvastatin to max tolerated dose then could consider PCSK9 inhibitor therapy.   - Continue dual  antiplatelet therapy with aspirin 81 mg daily and ticagrelor 90 mg twice daily (or other P2Y12 inhibitor) for at least 1 year post PCI.  - Counseled on starting outpatient cardiac rehab as planned, trying to gradually increase her activity level and exercise, and trying to stick to a heart healthy Mediterranean style diet.  - Follow up with Dr. Copeland in late July around the 1 year carlos following her PCI.     Thank you for the opportunity to participate in this pleasant patient's care. We would be happy to see her sooner if needed for any concerns in the meantime.    35 total minutes was spent today including chart review, precharting, history and exam, post visit documentation, and reviewing studies as outlined above.     Please kindly note that this document was completed in part using Dragon voice recognition software. Although reviewed after completion, some word substitutions and typographical errors may occur. Please contact me if clarification is needed.     KENDRA Bond, CNP   Nurse Practitioner  [facility]    Orders this Visit:  Orders Placed This Encounter   Procedures    Basic metabolic panel    Lipid panel reflex to direct LDL Fasting    ALT    Follow-Up with Cardiology     Orders Placed This Encounter   Medications    lisinopril (ZESTRIL) 20 MG tablet     Sig: Take 1 tablet (20 mg) by mouth daily.     Dispense:  90 tablet     Refill:  3    rosuvastatin (CRESTOR) 5 MG tablet     Sig: Take 0.5 tablets (2.5) mg once daily at bedtime x 2 weeks, then increase to 1 tablet (5 mg) once daily at bedtime.     Dispense:  90 tablet     Refill:  3     Medications Discontinued During This Encounter   Medication Reason    rosuvastatin (CRESTOR) 5 MG tablet     lisinopril (ZESTRIL) 10 MG tablet      Encounter Diagnoses   Name Primary?    Coronary artery disease involving native coronary artery of native heart without angina pectoris     Mixed hyperlipidemia     Essential hypertension      "Unstable angina (H)     Coronary artery disease involving native coronary artery of native heart with angina pectoris      CURRENT MEDICATIONS:  Current Outpatient Medications   Medication Sig Dispense Refill    aspirin 81 MG EC tablet Take 1 tablet (81 mg) by mouth daily Start tomorrow. 90 tablet 3    Biotin 800 MCG TABS Take 1 tablet by mouth daily      Calcium Carb-Cholecalciferol (CALCIUM+D3 PO)       COMIRNATY 30 MCG/0.3ML DIANE       ezetimibe (ZETIA) 10 MG tablet Take 1 tablet (10 mg) by mouth daily. 90 tablet 3    lisinopril (ZESTRIL) 20 MG tablet Take 1 tablet (20 mg) by mouth daily. 90 tablet 3    metoprolol succinate ER (TOPROL XL) 25 MG 24 hr tablet Take 1 tablet (25 mg) by mouth at bedtime. 90 tablet 0    nitroGLYcerin (NITROSTAT) 0.4 MG sublingual tablet One tablet under the tongue every 5 minutes if needed for chest pain. May repeat every 5 minutes for a maximum of 3 doses in 15 minutes\" 25 tablet 3    omeprazole (PRILOSEC) 20 MG DR capsule Take 20 mg by mouth daily as needed (heartburn)      rosuvastatin (CRESTOR) 5 MG tablet Take 0.5 tablets (2.5) mg once daily at bedtime x 2 weeks, then increase to 1 tablet (5 mg) once daily at bedtime. 90 tablet 3    ticagrelor (BRILINTA) 90 MG tablet Take 1 tablet (90 mg) by mouth 2 times daily. Dose to start tomorrow morning. 180 tablet 3    vitamin B-Complex Take 1 tablet by mouth daily       ALLERGIES  Allergies   Allergen Reactions    Ibuprofen Hives     \"I have taken Advil twice and both times noticed a rash on my neck.  I have not taken it in many years now.\"    Atorvastatin Muscle Pain (Myalgia)     Couldn't use right arm     PAST MEDICAL, SURGICAL, FAMILY HISTORY:  History was reviewed and updated as needed, see medical record.    SOCIAL HISTORY:  Social History     Socioeconomic History    Marital status: Single     Spouse name: Not on file    Number of children: Not on file    Years of education: Not on file    Highest education level: Not on file "   Occupational History    Not on file   Tobacco Use    Smoking status: Former     Current packs/day: 0.00     Average packs/day: 3.0 packs/day for 15.0 years (45.0 ttl pk-yrs)     Types: Cigarettes     Start date:      Quit date:      Years since quittin.1     Passive exposure: Never    Smokeless tobacco: Never   Vaping Use    Vaping status: Never Used   Substance and Sexual Activity    Alcohol use: Yes     Alcohol/week: 2.0 standard drinks of alcohol     Types: 2 Glasses of wine per week    Drug use: Not on file    Sexual activity: Not on file   Other Topics Concern    Parent/sibling w/ CABG, MI or angioplasty before 65F 55M? Not Asked   Social History Narrative    Not on file     Social Drivers of Health     Financial Resource Strain: Low Risk  (2024)    Financial Resource Strain     Within the past 12 months, have you or your family members you live with been unable to get utilities (heat, electricity) when it was really needed?: No   Food Insecurity: Low Risk  (2024)    Food Insecurity     Within the past 12 months, did you worry that your food would run out before you got money to buy more?: No     Within the past 12 months, did the food you bought just not last and you didn t have money to get more?: No   Transportation Needs: Low Risk  (2024)    Transportation Needs     Within the past 12 months, has lack of transportation kept you from medical appointments, getting your medicines, non-medical meetings or appointments, work, or from getting things that you need?: No   Physical Activity: Insufficiently Active (2024)    Exercise Vital Sign     Days of Exercise per Week: 4 days     Minutes of Exercise per Session: 30 min   Stress: No Stress Concern Present (2024)    [employer] [employer] - Occupational Stress Questionnaire     Feeling of Stress : Only a little   Social Connections: Unknown (2024)    Social Connection and Isolation Panel [NHANES]      "Frequency of Communication with Friends and Family: Not on file     Frequency of Social Gatherings with Friends and Family: Once a week     Attends Confucianism Services: Not on file     Active Member of Clubs or Organizations: Not on file     Attends Club or Organization Meetings: Not on file     Marital Status: Not on file   Interpersonal Safety: Low Risk  (9/4/2024)    Interpersonal Safety     Do you feel physically and emotionally safe where you currently live?: Yes     Within the past 12 months, have you been hit, slapped, kicked or otherwise physically hurt by someone?: No     Within the past 12 months, have you been humiliated or emotionally abused in other ways by your partner or ex-partner?: No   Housing Stability: Low Risk  (9/4/2024)    Housing Stability     Do you have housing? : Yes     Are you worried about losing your housing?: No     Review of Systems:  Focused cardiovascular and respiratory review of systems is negative other than the symptoms noted above in the HPI.     Physical Exam:  Vitals: BP (!) 142/68   Pulse 73   Ht 1.626 m (5' 4\")   Wt 66.1 kg (145 lb 12.8 oz)   LMP  (LMP Unknown)   SpO2 96%   BMI 25.03 kg/m     Wt Readings from Last 4 Encounters:   02/19/25 66.1 kg (145 lb 12.8 oz)   11/15/24 65.8 kg (145 lb 1.6 oz)   09/04/24 64 kg (141 lb)   08/06/24 62.3 kg (137 lb 6.4 oz)     Constitutional: Alert and in no acute distress.  Neck: No JVD.  Cardiovascular:  Regular rate and rhythm, no murmur, rub or gallop.   Respiratory: Breathing non-labored. Lungs are clear to auscultation with no wheezes or crackles bilaterally.    Gastrointestinal: Abdomen non-distended.  Extremities: No lower extremity edema.   Neuropsychiatric: No gross focal deficits. Affect appropriate. Mentation normal.     Recent Lab Results:  LIPID RESULTS:  Lab Results   Component Value Date    CHOL 164 02/17/2025    HDL 65 02/17/2025    LDL 83 02/17/2025    TRIG 80 02/17/2025     LIVER ENZYME RESULTS:  Lab Results "   Component Value Date    AST 28 08/28/2023    ALT 22 02/17/2025     CBC RESULTS:  Lab Results   Component Value Date    WBC 5.1 02/17/2025    RBC 4.92 02/17/2025    HGB 13.5 02/17/2025    HCT 41.0 02/17/2025    MCV 83 02/17/2025    MCH 27.4 02/17/2025    MCHC 32.9 02/17/2025    RDW 15.3 (H) 02/17/2025     02/17/2025     BMP RESULTS:  Lab Results   Component Value Date     02/17/2025    POTASSIUM 4.7 02/17/2025    POTASSIUM 4.4 07/23/2021    CHLORIDE 105 02/17/2025    CHLORIDE 105 07/23/2021    CO2 25 02/17/2025    CO2 24 07/23/2021    ANIONGAP 11 02/17/2025    ANIONGAP 14 07/23/2021    GLC 99 02/17/2025    GLC 88 07/23/2021    BUN 19.8 02/17/2025    BUN 23 (H) 07/23/2021    CR 0.70 02/17/2025    GFRESTIMATED >90 02/17/2025    GFRESTIMATED >60 07/17/2024    GFRESTIMATED >60 11/02/2020    GFRESTBLACK >60 11/02/2020    YVETTE 9.7 02/17/2025      A1C RESULTS:  Lab Results   Component Value Date    A1C 5.6 07/30/2024     CC  Tab Copeland MD  [id number]  [place] [id number]

## 2025-02-19 NOTE — LETTER
2/19/2025    JEREMIAH CHAUHAN MD  [address] [place] [id number]    RE: Geneva Bond       Dear Colleague,     I had the pleasure of seeing Geneva Bond in the [facility].    Cardiology Clinic Progress Note    Service Date: February 19, 2025  Primary Cardiology Team: Dr. Copeland    HISTORY OF PRESENT ILLNESS:  I had the pleasure of meeting Ms. Geneva Bond in the clinic today. She is a very pleasant 74 year old female with a past medical history notable for hypertension, hyperlipidemia with myalgias on atorvastatin, former smoking, GERD, and coronary artery disease.  She recently presented to [facility] on 7/30/24 with symptoms of chest pain concerning for unstable angina in the setting of her recent coronary CTA which showed significant CAD with a calcium score of 499 putting her in the 90th percentile with a subtotal occlusion of the mid LAD.  She was sent for invasive coronary angiography on 7/31/24 and underwent complex PCI of the LAD and second diagonal with kissing balloon technique and placement of a drug-eluting stent from the proximal pole to mid LAD with dilation of the side cell of the LAD stent into the ostium of the second diagonal.  The second diagonal was not stented since it was small at less than 2 mm and had ALVIN-3 flow.  Given the history of severe myalgias on atorvastatin, trial of rosuvastatin at a very low-dose of 2.5 mg every other day was suggested.  She was otherwise discharged on aspirin 81 mg daily and ticagrelor 90 mg twice daily.    She last met with Dr. Copeland in the clinic in follow up in November 2024 and was doing well overall from a cardiac standpoint at that time. She had concerns regarding frequent urination on the combination of lisinopril-hydrochlorothiazide so this was switched to lisinopril alone at 10 mg once daily. Zetia was also added at 10 mg once daily for better management of her cholesterol levels.      Labs were rechecked earlier this week on 2/17/2025 with a fasting lipid profile showing total cholesterol 164, HDL at 65, LDL at 83, and triglycerides at 80.  ALT level  is within normal limits at 22.  Based metabolic panel shows stable electrolytes and renal function and a CBC was also checked and is unremarkable with no anemia.    Today, Ms. Bond presents to the clinic in follow up of of her medication changes.  She states she has been feeling better with stopping the hydrochlorothiazide with resolution of the issues with urinary frequency.  However, she brings in a log of her recent blood pressure readings from home and notes that they have been running quite a bit higher since stopping the HCTZ with readings primarily around the 140s systolic range.  She otherwise notes she has been tolerating Zetia well and denies concerns as far as symptoms from a cardiac standpoint.  She has been going to the gym several times per week and has been tolerating her usual activity level and exercises well without exertional symptoms or limitations.    ASSESSMENT:  1.  Coronary artery disease s/p complex PCI of the LAD and second diagonal with ADALID x 1 on 7/31/24. Stable without angina.  2.  Hyperlipidemia. History of myalgias on atorvastatin. Currently on rosuvastatin 2.5 mg every other day and Zetia 10 mg daily with LDL at 83.  3.  Hypertension. Blood pressure sub-optimally controlled on lisinopril 10 mg once daily and metoprolol succinate 25 mg daily.   4.  GERD. Managed with intermittent omeprazole use.    PLAN:  - Increase lisinopril from 10 mg to 20 mg once daily for better blood pressure control shooting for readings more consistently around 120/80 range.   - Repeat a BMP in 1-2 weeks after increasing lisinopril.  - Recommend trial of increasing rosuvastatin from 2.5 mg every other day up to daily for 2 weeks and then if she is tolerating this okay I instructed her to increase this to 5 mg once daily. We will  recheck a fasting lipid profile ALT in about 2-3 months. If LDL remains above 70 despite trial of gradually increasing rosuvastatin to max tolerated dose then could consider PCSK9 inhibitor therapy.   - Continue dual antiplatelet therapy with aspirin 81 mg daily and ticagrelor 90 mg twice daily (or other P2Y12 inhibitor) for at least 1 year post PCI.  - Counseled on starting outpatient cardiac rehab as planned, trying to gradually increase her activity level and exercise, and trying to stick to a heart healthy Mediterranean style diet.  - Follow up with Dr. Copeland in late July around the 1 year calros following her PCI.     Thank you for the opportunity to participate in this pleasant patient's care. We would be happy to see her sooner if needed for any concerns in the meantime.    35 total minutes was spent today including chart review, precharting, history and exam, post visit documentation, and reviewing studies as outlined above.     Please kindly note that this document was completed in part using Dragon voice recognition software. Although reviewed after completion, some word substitutions and typographical errors may occur. Please contact me if clarification is needed.     KENDRA Bond, CNP   Nurse Practitioner  [facility]    Orders this Visit:  Orders Placed This Encounter   Procedures     Basic metabolic panel     Lipid panel reflex to direct LDL Fasting     ALT     Follow-Up with Cardiology     Orders Placed This Encounter   Medications     lisinopril (ZESTRIL) 20 MG tablet     Sig: Take 1 tablet (20 mg) by mouth daily.     Dispense:  90 tablet     Refill:  3     rosuvastatin (CRESTOR) 5 MG tablet     Sig: Take 0.5 tablets (2.5) mg once daily at bedtime x 2 weeks, then increase to 1 tablet (5 mg) once daily at bedtime.     Dispense:  90 tablet     Refill:  3     Medications Discontinued During This Encounter   Medication Reason     rosuvastatin (CRESTOR) 5 MG tablet      lisinopril  "(ZESTRIL) 10 MG tablet      Encounter Diagnoses   Name Primary?     Coronary artery disease involving native coronary artery of native heart without angina pectoris      Mixed hyperlipidemia      Essential hypertension      Unstable angina (H)      Coronary artery disease involving native coronary artery of native heart with angina pectoris      CURRENT MEDICATIONS:  Current Outpatient Medications   Medication Sig Dispense Refill     aspirin 81 MG EC tablet Take 1 tablet (81 mg) by mouth daily Start tomorrow. 90 tablet 3     Biotin 800 MCG TABS Take 1 tablet by mouth daily       Calcium Carb-Cholecalciferol (CALCIUM+D3 PO)        COMIRNATY 30 MCG/0.3ML DIANE        ezetimibe (ZETIA) 10 MG tablet Take 1 tablet (10 mg) by mouth daily. 90 tablet 3     lisinopril (ZESTRIL) 20 MG tablet Take 1 tablet (20 mg) by mouth daily. 90 tablet 3     metoprolol succinate ER (TOPROL XL) 25 MG 24 hr tablet Take 1 tablet (25 mg) by mouth at bedtime. 90 tablet 0     nitroGLYcerin (NITROSTAT) 0.4 MG sublingual tablet One tablet under the tongue every 5 minutes if needed for chest pain. May repeat every 5 minutes for a maximum of 3 doses in 15 minutes\" 25 tablet 3     omeprazole (PRILOSEC) 20 MG DR capsule Take 20 mg by mouth daily as needed (heartburn)       rosuvastatin (CRESTOR) 5 MG tablet Take 0.5 tablets (2.5) mg once daily at bedtime x 2 weeks, then increase to 1 tablet (5 mg) once daily at bedtime. 90 tablet 3     ticagrelor (BRILINTA) 90 MG tablet Take 1 tablet (90 mg) by mouth 2 times daily. Dose to start tomorrow morning. 180 tablet 3     vitamin B-Complex Take 1 tablet by mouth daily       ALLERGIES  Allergies   Allergen Reactions     Ibuprofen Hives     \"I have taken Advil twice and both times noticed a rash on my neck.  I have not taken it in many years now.\"     Atorvastatin Muscle Pain (Myalgia)     Couldn't use right arm     PAST MEDICAL, SURGICAL, FAMILY HISTORY:  History was reviewed and updated as needed, see medical " record.    SOCIAL HISTORY:  Social History     Socioeconomic History     Marital status: Single     Spouse name: Not on file     Number of children: Not on file     Years of education: Not on file     Highest education level: Not on file   Occupational History     Not on file   Tobacco Use     Smoking status: Former     Current packs/day: 0.00     Average packs/day: 3.0 packs/day for 15.0 years (45.0 ttl pk-yrs)     Types: Cigarettes     Start date:      Quit date:      Years since quittin.1     Passive exposure: Never     Smokeless tobacco: Never   Vaping Use     Vaping status: Never Used   Substance and Sexual Activity     Alcohol use: Yes     Alcohol/week: 2.0 standard drinks of alcohol     Types: 2 Glasses of wine per week     Drug use: Not on file     Sexual activity: Not on file   Other Topics Concern     Parent/sibling w/ CABG, MI or angioplasty before 65F 55M? Not Asked   Social History Narrative     Not on file     Social Drivers of Health     Financial Resource Strain: Low Risk  (2024)    Financial Resource Strain      Within the past 12 months, have you or your family members you live with been unable to get utilities (heat, electricity) when it was really needed?: No   Food Insecurity: Low Risk  (2024)    Food Insecurity      Within the past 12 months, did you worry that your food would run out before you got money to buy more?: No      Within the past 12 months, did the food you bought just not last and you didn t have money to get more?: No   Transportation Needs: Low Risk  (2024)    Transportation Needs      Within the past 12 months, has lack of transportation kept you from medical appointments, getting your medicines, non-medical meetings or appointments, work, or from getting things that you need?: No   Physical Activity: Insufficiently Active (2024)    Exercise Vital Sign      Days of Exercise per Week: 4 days      Minutes of Exercise per Session: 30 min   Stress: No  "Stress Concern Present (9/4/2024)    [employer] - Occupational Stress Questionnaire      Feeling of Stress : Only a little   Social Connections: Unknown (9/4/2024)    Social Connection and Isolation Panel [NHANES]      Frequency of Communication with Friends and Family: Not on file      Frequency of Social Gatherings with Friends and Family: Once a week      Attends [employer] Services: Not on file      Active Member of Clubs or Organizations: Not on file      Attends Club or Organization Meetings: Not on file      Marital Status: Not on file   Interpersonal Safety: Low Risk  (9/4/2024)    Interpersonal Safety      Do you feel physically and emotionally safe where you currently live?: Yes      Within the past 12 months, have you been hit, slapped, kicked or otherwise physically hurt by someone?: No      Within the past 12 months, have you been humiliated or emotionally abused in other ways by your partner or ex-partner?: No   Housing Stability: Low Risk  (9/4/2024)    Housing Stability      Do you have housing? : Yes      Are you worried about losing your housing?: No     Review of Systems:  Focused cardiovascular and respiratory review of systems is negative other than the symptoms noted above in the HPI.     Physical Exam:  Vitals: BP (!) 142/68   Pulse 73   Ht 1.626 m (5' 4\")   Wt 66.1 kg (145 lb 12.8 oz)   LMP  (LMP Unknown)   SpO2 96%   BMI 25.03 kg/m     Wt Readings from Last 4 Encounters:   02/19/25 66.1 kg (145 lb 12.8 oz)   11/15/24 65.8 kg (145 lb 1.6 oz)   09/04/24 64 kg (141 lb)   08/06/24 62.3 kg (137 lb 6.4 oz)     Constitutional: Alert and in no acute distress.  Neck: No JVD.  Cardiovascular:  Regular rate and rhythm, no murmur, rub or gallop.   Respiratory: Breathing non-labored. Lungs are clear to auscultation with no wheezes or crackles bilaterally.    Gastrointestinal: Abdomen non-distended.  Extremities: No lower extremity edema.   Neuropsychiatric: No gross " focal deficits. Affect appropriate. Mentation normal.     Recent Lab Results:  LIPID RESULTS:  Lab Results   Component Value Date    CHOL 164 02/17/2025    HDL 65 02/17/2025    LDL 83 02/17/2025    TRIG 80 02/17/2025     LIVER ENZYME RESULTS:  Lab Results   Component Value Date    AST 28 08/28/2023    ALT 22 02/17/2025     CBC RESULTS:  Lab Results   Component Value Date    WBC 5.1 02/17/2025    RBC 4.92 02/17/2025    HGB 13.5 02/17/2025    HCT 41.0 02/17/2025    MCV 83 02/17/2025    MCH 27.4 02/17/2025    MCHC 32.9 02/17/2025    RDW 15.3 (H) 02/17/2025     02/17/2025     BMP RESULTS:  Lab Results   Component Value Date     02/17/2025    POTASSIUM 4.7 02/17/2025    POTASSIUM 4.4 07/23/2021    CHLORIDE 105 02/17/2025    CHLORIDE 105 07/23/2021    CO2 25 02/17/2025    CO2 24 07/23/2021    ANIONGAP 11 02/17/2025    ANIONGAP 14 07/23/2021    GLC 99 02/17/2025    GLC 88 07/23/2021    BUN 19.8 02/17/2025    BUN 23 (H) 07/23/2021    CR 0.70 02/17/2025    GFRESTIMATED >90 02/17/2025    GFRESTIMATED >60 07/17/2024    GFRESTIMATED >60 11/02/2020    GFRESTBLACK >60 11/02/2020    YVETTE 9.7 02/17/2025      A1C RESULTS:  Lab Results   Component Value Date    A1C 5.6 07/30/2024     CC  Tab Copeland MD  [id number]  [place] [id number]      Thank you for allowing me to participate in the care of your patient.      Sincerely,     Zechariah Han NP     [facility]  cc:   Tab Copeland MD  [id number] [place] [id number]

## 2025-02-26 ENCOUNTER — TRANSFERRED RECORDS (OUTPATIENT)
Dept: HEALTH INFORMATION MANAGEMENT | Facility: CLINIC | Age: 75
End: 2025-02-26
Payer: COMMERCIAL

## 2025-03-09 ENCOUNTER — HEALTH MAINTENANCE LETTER (OUTPATIENT)
Age: 75
End: 2025-03-09

## 2025-04-15 ENCOUNTER — MYC REFILL (OUTPATIENT)
Dept: FAMILY MEDICINE | Facility: CLINIC | Age: 75
End: 2025-04-15
Payer: COMMERCIAL

## 2025-04-15 DIAGNOSIS — I25.119 CORONARY ARTERY DISEASE INVOLVING NATIVE CORONARY ARTERY OF NATIVE HEART WITH ANGINA PECTORIS: ICD-10-CM

## 2025-04-16 RX ORDER — METOPROLOL SUCCINATE 25 MG/1
25 TABLET, EXTENDED RELEASE ORAL AT BEDTIME
Qty: 90 TABLET | Refills: 2 | Status: SHIPPED | OUTPATIENT
Start: 2025-04-16

## 2025-05-28 ENCOUNTER — LAB (OUTPATIENT)
Dept: LAB | Facility: CLINIC | Age: 75
End: 2025-05-28
Payer: MEDICARE

## 2025-05-28 DIAGNOSIS — E78.2 MIXED HYPERLIPIDEMIA: ICD-10-CM

## 2025-05-28 DIAGNOSIS — I25.10 CORONARY ARTERY DISEASE INVOLVING NATIVE CORONARY ARTERY OF NATIVE HEART WITHOUT ANGINA PECTORIS: ICD-10-CM

## 2025-05-28 DIAGNOSIS — I10 ESSENTIAL HYPERTENSION: ICD-10-CM

## 2025-05-28 LAB
ALT SERPL W P-5'-P-CCNC: 31 U/L (ref 0–50)
CHOLEST SERPL-MCNC: 118 MG/DL
FASTING STATUS PATIENT QL REPORTED: NORMAL
HDLC SERPL-MCNC: 63 MG/DL
LDLC SERPL CALC-MCNC: 40 MG/DL
NONHDLC SERPL-MCNC: 55 MG/DL
TRIGL SERPL-MCNC: 73 MG/DL

## 2025-05-28 PROCEDURE — 84460 ALANINE AMINO (ALT) (SGPT): CPT

## 2025-05-28 PROCEDURE — 36415 COLL VENOUS BLD VENIPUNCTURE: CPT

## 2025-05-28 PROCEDURE — 80061 LIPID PANEL: CPT

## 2025-05-30 ENCOUNTER — RESULTS FOLLOW-UP (OUTPATIENT)
Dept: CARDIOLOGY | Facility: CLINIC | Age: 75
End: 2025-05-30

## 2025-07-30 ENCOUNTER — PATIENT OUTREACH (OUTPATIENT)
Dept: CARE COORDINATION | Facility: CLINIC | Age: 75
End: 2025-07-30
Payer: MEDICARE

## 2025-08-04 ENCOUNTER — TRANSFERRED RECORDS (OUTPATIENT)
Dept: HEALTH INFORMATION MANAGEMENT | Facility: CLINIC | Age: 75
End: 2025-08-04
Payer: MEDICARE

## 2025-08-05 ENCOUNTER — PATIENT OUTREACH (OUTPATIENT)
Dept: CARE COORDINATION | Facility: CLINIC | Age: 75
End: 2025-08-05
Payer: MEDICARE

## 2025-08-06 ENCOUNTER — HOSPITAL ENCOUNTER (OUTPATIENT)
Dept: MAMMOGRAPHY | Facility: CLINIC | Age: 75
Discharge: HOME | End: 2025-08-06
Attending: FAMILY MEDICINE
Payer: MEDICARE

## 2025-08-06 DIAGNOSIS — Z12.31 VISIT FOR SCREENING MAMMOGRAM: ICD-10-CM

## 2025-08-06 PROCEDURE — 77063 BREAST TOMOSYNTHESIS BI: CPT

## (undated) DEVICE — NDL PERC ENTRY THINWALL 18GA 7.0" G00166

## (undated) DEVICE — CATH BALLOON EMERGE 2.0X15MM H7493918915200

## (undated) DEVICE — CATH DIAG 4FR JL 4.5 538417

## (undated) DEVICE — RAD INFLATOR BASIC COMPAK  IN4130

## (undated) DEVICE — CATH US OD 5FR OD SEC 2.9FR EAGLE EYE PLATINUM 0.014 85900P

## (undated) DEVICE — CATH BALLOON NC EMERGE 2.75X15MM H7493926715270

## (undated) DEVICE — MANIFOLD KIT ANGIO AUTOMATED 014613

## (undated) DEVICE — CATH ANGIO INFINITI 3DRC 4FRX100CM 538476

## (undated) DEVICE — CATH BALLOON NC EMERGE 3.25X8MM H7493926708320

## (undated) DEVICE — INTRO SHEATH 7FRX10CM PINNACLE RSS702

## (undated) DEVICE — KIT HAND CONTROL ANGIOTOUCH ACIST 65CM AT-P65

## (undated) DEVICE — INFL DVC BASIXCOMPAK PLYCRB 30 ATM 13IN 20ML IN4530

## (undated) DEVICE — CATH BALLOON NC EMERGE 3.25X15MM H7493926715320

## (undated) DEVICE — INTRO SHEATH 4FRX10CM PINNACLE RSS402

## (undated) DEVICE — RAD CLOSURE ANGIOSEAL 8FR  610131

## (undated) DEVICE — GUIDEWIRE VASC 0.014INX180CM RUNTHROUGH 25-1011

## (undated) DEVICE — GW VASC OMNIWIRE J L185CM PRESSURE 89185J

## (undated) DEVICE — Device

## (undated) RX ORDER — FENTANYL CITRATE 50 UG/ML
INJECTION, SOLUTION INTRAMUSCULAR; INTRAVENOUS
Status: DISPENSED
Start: 2024-07-31

## (undated) RX ORDER — HEPARIN SODIUM 1000 [USP'U]/ML
INJECTION, SOLUTION INTRAVENOUS; SUBCUTANEOUS
Status: DISPENSED
Start: 2024-07-31

## (undated) RX ORDER — IVABRADINE 5 MG/1
TABLET, FILM COATED ORAL
Status: DISPENSED
Start: 2024-07-17

## (undated) RX ORDER — NITROGLYCERIN 5 MG/ML
VIAL (ML) INTRAVENOUS
Status: DISPENSED
Start: 2024-07-31

## (undated) RX ORDER — HEPARIN SODIUM 200 [USP'U]/100ML
INJECTION, SOLUTION INTRAVENOUS
Status: DISPENSED
Start: 2024-07-31

## (undated) RX ORDER — LIDOCAINE HYDROCHLORIDE 10 MG/ML
INJECTION, SOLUTION EPIDURAL; INFILTRATION; INTRACAUDAL; PERINEURAL
Status: DISPENSED
Start: 2024-07-31

## (undated) RX ORDER — METOPROLOL TARTRATE 50 MG
TABLET ORAL
Status: DISPENSED
Start: 2024-07-17

## (undated) RX ORDER — METOPROLOL TARTRATE 1 MG/ML
INJECTION, SOLUTION INTRAVENOUS
Status: DISPENSED
Start: 2024-07-17